# Patient Record
Sex: FEMALE | Race: WHITE | Employment: FULL TIME | URBAN - METROPOLITAN AREA
[De-identification: names, ages, dates, MRNs, and addresses within clinical notes are randomized per-mention and may not be internally consistent; named-entity substitution may affect disease eponyms.]

---

## 2017-02-27 ENCOUNTER — OFFICE VISIT (OUTPATIENT)
Dept: FAMILY MEDICINE | Facility: CLINIC | Age: 42
End: 2017-02-27
Payer: COMMERCIAL

## 2017-02-27 VITALS
TEMPERATURE: 97.6 F | HEART RATE: 82 BPM | DIASTOLIC BLOOD PRESSURE: 86 MMHG | HEIGHT: 66 IN | SYSTOLIC BLOOD PRESSURE: 120 MMHG | WEIGHT: 184 LBS | BODY MASS INDEX: 29.57 KG/M2

## 2017-02-27 DIAGNOSIS — B37.9 CANDIDA INFECTION: Primary | ICD-10-CM

## 2017-02-27 DIAGNOSIS — L30.9 VULVAR DERMATITIS: ICD-10-CM

## 2017-02-27 PROCEDURE — 99214 OFFICE O/P EST MOD 30 MIN: CPT | Performed by: FAMILY MEDICINE

## 2017-02-27 RX ORDER — FLUCONAZOLE 150 MG/1
150 TABLET ORAL
Qty: 6 TABLET | Refills: 1 | Status: SHIPPED | OUTPATIENT
Start: 2017-02-27 | End: 2018-02-06

## 2017-02-27 NOTE — PROGRESS NOTES
SUBJECTIVE:                                                    Kathi Rehman is a 41 year old female who presents to clinic today for the following health issues:    Has been on going for 5 years, seen [facility] and other providers.   Vaginal irritation. Recently bad chapped lips.   Told Eczema, has tried many    Pins and needles, burning, discomfort. Increased at night.   Patty Maza CMA    Problem list and histories reviewed & adjusted, as indicated.  Additional history: has burning pins and needles in the groin and it is annoying she has taking ice packs steroid creams, lotions for eczema and   Saw a derm who thought it was eczema   She has also been seeing an [facility] doctor and she thought it might be a uterine infection   She does have bumps in the area and primarily in the groin /labial area had been very intermittent but now it is more persistent in the last 3-4 months\  She would like to know what it is and how to treat ti. She hasnever taken antihistamines  She did do the best when she took oral antibitoics.       Patient Active Problem List   Diagnosis     Oligomenorrhea     PCOS (polycystic ovarian syndrome)     CARDIOVASCULAR SCREENING; LDL GOAL LESS THAN 160     Abscess     Past Surgical History   Procedure Laterality Date     Excise exostosis toe(s) Right 1/13/2015     Procedure: EXCISE EXOSTOSIS TOE(S);  Surgeon: Tacho Christensen DPM;  Location: [place] OR       Social History   Substance Use Topics     Smoking status: Former Smoker     Smokeless tobacco: Never Used     Alcohol use Yes      Comment: 1 drinks a week or less     Family History   Problem Relation Age of Onset     DIABETES Maternal Grandfather      Cancer - colorectal Maternal Grandfather      Prostate Cancer Paternal Grandmother      Prostate Cancer Paternal Grandfather            ROS:  Constitutional, HEENT, cardiovascular, pulmonary, gi and gu systems are negative, except as otherwise noted.    OBJECTIVE:                   "                                  BP (!) 141/98 (BP Location: Right arm, Cuff Size: Adult Regular)  Pulse 82  Temp 97.6  F (36.4  C) (Tympanic)  Ht 5' 6\" (1.676 m)  Wt 184 lb (83.5 kg)  BMI 29.7 kg/m2 Body mass index is 29.7 kg/(m^2).   GENERAL APPEARANCE: healthy, alert and no distress  ABDOMEN: soft, nontender, without hepatosplenomegaly or masses and bowel sounds normal   (female): normal cervix, adnexae, and uterus without masses or discharge  SKIN: excoriation - bialteral groin excoriations with red base  and lichenification no vesicles no lesions on vulva       ASSESSMENT/PLAN:                                                    1. Candida infection    - fluconazole (DIFLUCAN) 150 MG tablet; Take 1 tablet (150 mg) by mouth every 3 days  Dispense: 6 tablet; Refill: 1    2. Vulvar dermatitis  Patient Instructions   Take the diflucan 1 every 3 days  You can use the topical steroid also if the itching is very bad for the next 1-2 weeks  If this is working you can do this every now and then            reports that she has quit smoking. She has never used smokeless tobacco.          Berta Lake M.D.  [facility]    "

## 2017-02-27 NOTE — MR AVS SNAPSHOT
After Visit Summary   2/27/2017    Kathi Rehman    MRN: [id number]           Patient Information     Date Of Birth          1975        Visit Information        Provider Department      2/27/2017 3:00 PM Berta Lake MD [facility]        Today's Diagnoses     Candida infection    -  1      Care Instructions    Take the diflucan 1 every 3 days  You can use the topical steroid also if the itching is very bad for the next 1-2 weeks  If this is working you can do this every now and then         Follow-ups after your visit        Your next 10 appointments already scheduled     Mar 21, 2017 10:15 AM CDT   [facility] with Angie Morales MD   [facility]    [address] [address] [phone]           Bring a current list of meds and any records pertaining to this visit.  For Physicals, please bring immunization records and any forms needing to be filled out.  Please arrive 10 minutes early to complete paperwork.              Who to contact     Normal or non-critical lab and imaging results will be communicated to you by "[employer]hart, letter or phone within 4 business days after the clinic has received the results. If you do not hear from us within 7 days, please contact the clinic through [employer] or phone. If you have a critical or abnormal lab result, we will notify you by phone as soon as possible.  Submit refill requests through [email or web address] or call your pharmacy and they will forward the refill request to us. Please allow 3 business days for your refill to be completed.          If you need to speak with a  for additional information , please call: [phone]             Additional Information About Your Visit        [email or web address] Information     [email or web address] lets you send messages to your doctor, view your test results, renew your prescriptions, schedule appointments and more. To sign up, go to  [email or web address] . Click on \"Log in\" on the left side of the screen, which will take you to the Welcome page. Then click on \"Sign up Now\" on the right side of the page.     You will be asked to enter the access code listed below, as well as some personal information. Please follow the directions to create your username and password.     Your access code is: [email or web address]  Expires: 3/1/2017  9:24 AM     Your access code will  in 90 days. If you need help or a new code, please call your [place] clinic or [phone].        Care EveryWhere ID     This is your Care EveryWhere ID. This could be used by other organizations to access your [place] medical records  [id number]        Your Vitals Were     Pulse Temperature Height BMI (Body Mass Index)          82 97.6  F (36.4  C) (Tympanic) 5' 6\" (1.676 m) 29.7 kg/m2         Blood Pressure from Last 3 Encounters:   17 (!) 141/98   16 118/83   16 96/76    Weight from Last 3 Encounters:   17 184 lb (83.5 kg)   16 178 lb 3.2 oz (80.8 kg)   16 174 lb 6.4 oz (79.1 kg)              Today, you had the following     No orders found for display         Today's Medication Changes          These changes are accurate as of: 17  3:57 PM.  If you have any questions, ask your nurse or doctor.               Start taking these medicines.        Dose/Directions    fluconazole 150 MG tablet   Commonly known as:  DIFLUCAN   Used for:  Candida infection   Started by:  Berta Lake MD        Dose:  150 mg   Take 1 tablet (150 mg) by mouth every 3 days   Quantity:  6 tablet   Refills:  1            Where to get your medicines      These medications were sent to [facility] - [address]  [address] [address]     Phone:  [phone]     fluconazole 150 MG tablet                Primary Care Provider Office Phone # Fax #    Kathy Venegas [phone]       [facility] [place] [facility] [address] [place] [address]        Thank you!     Thank you for choosing [facility]  for your care. Our goal is always to provide you with excellent care. Hearing back from our patients is one way we can continue to improve our services. Please take a few minutes to complete the written survey that you may receive in the mail after your visit with us. Thank you!             Your Updated Medication List - Protect others around you: Learn how to safely use, store and throw away your medicines at www.disposemymeds.org.          This list is accurate as of: 2/27/17  3:57 PM.  Always use your most recent med list.                   Brand Name Dispense Instructions for use    drospirenone-ethinyl estradiol 3-0.03 MG per tablet    YVON    90 tablet    Take 1 tablet by mouth daily       fluconazole 150 MG tablet    DIFLUCAN    6 tablet    Take 1 tablet (150 mg) by mouth every 3 days

## 2017-02-27 NOTE — PATIENT INSTRUCTIONS
Take the diflucan 1 every 3 days  You can use the topical steroid also if the itching is very bad for the next 1-2 weeks  If this is working you can do this every now and then

## 2017-02-27 NOTE — NURSING NOTE
"Chief Complaint   Patient presents with     Derm Problem       Initial BP (!) 141/98 (BP Location: Right arm, Cuff Size: Adult Regular)  Pulse 82  Temp 97.6  F (36.4  C) (Tympanic)  Ht 5' 6\" (1.676 m)  Wt 184 lb (83.5 kg)  BMI 29.7 kg/m2 Estimated body mass index is 29.7 kg/(m^2) as calculated from the following:    Height as of this encounter: 5' 6\" (1.676 m).    Weight as of this encounter: 184 lb (83.5 kg).  Medication Reconciliation: complete     Patty aMza CMA    "

## 2017-03-21 ENCOUNTER — OFFICE VISIT (OUTPATIENT)
Dept: OBGYN | Facility: CLINIC | Age: 42
End: 2017-03-21
Payer: COMMERCIAL

## 2017-03-21 VITALS
DIASTOLIC BLOOD PRESSURE: 93 MMHG | WEIGHT: 184.8 LBS | BODY MASS INDEX: 29.7 KG/M2 | HEIGHT: 66 IN | HEART RATE: 99 BPM | TEMPERATURE: 98.1 F | SYSTOLIC BLOOD PRESSURE: 126 MMHG

## 2017-03-21 DIAGNOSIS — N89.8 VAGINAL ITCHING: Primary | ICD-10-CM

## 2017-03-21 LAB
MICRO REPORT STATUS: NORMAL
SPECIMEN SOURCE: NORMAL
WET PREP SPEC: NORMAL

## 2017-03-21 PROCEDURE — 87210 SMEAR WET MOUNT SALINE/INK: CPT | Performed by: OBSTETRICS & GYNECOLOGY

## 2017-03-21 PROCEDURE — 88312 SPECIAL STAINS GROUP 1: CPT | Performed by: OBSTETRICS & GYNECOLOGY

## 2017-03-21 PROCEDURE — 56605 BIOPSY OF VULVA/PERINEUM: CPT | Performed by: OBSTETRICS & GYNECOLOGY

## 2017-03-21 PROCEDURE — 88305 TISSUE EXAM BY PATHOLOGIST: CPT | Performed by: OBSTETRICS & GYNECOLOGY

## 2017-03-21 PROCEDURE — 99204 OFFICE O/P NEW MOD 45 MIN: CPT | Mod: 25 | Performed by: OBSTETRICS & GYNECOLOGY

## 2017-03-21 RX ORDER — NYSTATIN AND TRIAMCINOLONE ACETONIDE 100000; 1 [USP'U]/G; MG/G
CREAM TOPICAL 2 TIMES DAILY
Qty: 15 G | Refills: 1 | Status: SHIPPED | OUTPATIENT
Start: 2017-03-21 | End: 2018-02-06

## 2017-03-21 RX ORDER — LIDOCAINE HYDROCHLORIDE AND EPINEPHRINE 10; 10 MG/ML; UG/ML
3 INJECTION, SOLUTION INFILTRATION; PERINEURAL ONCE
Qty: 3 ML | Refills: 0 | OUTPATIENT
Start: 2017-03-21 | End: 2017-03-21

## 2017-03-21 RX ORDER — DIPHENHYDRAMINE HYDROCHLORIDE, ZINC ACETATE 2; .1 G/100G; G/100G
CREAM TOPICAL 3 TIMES DAILY PRN
Qty: 1 TUBE | Refills: 3 | Status: SHIPPED | OUTPATIENT
Start: 2017-03-21 | End: 2018-02-06

## 2017-03-21 NOTE — PROGRESS NOTES
Please call with results and let her know they are normal.      No yeast visible on the surface of the skin.     Angie Morlaes MD, MPH

## 2017-03-21 NOTE — PROGRESS NOTES
"  SUBJECTIVE:                                                   CC:  Patient presents with:  Consult: vaginal itching and burning      HPI:  Kathi Rehman is a 41 year old  who presents to discuss groin itching.  She has noted a burning and itching feeling in her bilateral groin area for the last 5 years.  It flared in November and she was seen by a dermatologist who told her it was eczema.  She was given antibiotics and a hydrating skin cream which did improve the rash for awhile.  In December it flared again, in  once more, and in February when it flared she also experienced lip swelling.  She tries hard not to scratch it, but does rub it for palliation.  She has tried benadryl.  She has tried hydrocortisone cream.  She has never been tested for yeast and it has never been biopsied.  She does not shave the area.  She scratches it overnight her  has told her.    ROS: 10 point ROS negative other than as listed above in HPI.    Gyn History:  Patient's last menstrual period was 2017.     Patient is not sexually active.  Using oral contraceptives for contraception.   Recent pap smears:    Lab Results   Component Value Date    PAP NIL 2016       PMH, PSH, Soc Hx, Meds, and allergies reviewed in Epic.    OBJECTIVE:     BP (!) 126/93 (BP Location: Right arm, Patient Position: Chair, Cuff Size: Adult Regular)  Pulse 99  Temp 98.1  F (36.7  C) (Oral)  Ht 5' 6\" (1.676 m)  Wt 184 lb 12.8 oz (83.8 kg)  LMP 2017  Breastfeeding? No  BMI 29.83 kg/m2  Body mass index is 29.83 kg/(m^2).    Gen: Healthy appearing female, no acute distress, comfortable, very dry chapped lips  HENT: No scleral injection or icterus  CV: Regular rate  Resp: Normal work of breathing, no cough  GI: Abdomen soft, non-tender. No masses, organomegaly  Skin: No suspicious lesions or rashes on other areas of skin  Psychiatric: mentation appears normal and affect bright    :  Normal hair distribution.  Wet prep " obtained from the groin area. There are multiple small erythematous papules on distribution of the follicles, very small ulcerations, not overly tender, and they are spread over the mons and bilaterally into the groin.  SSE: Speculum exam reveals vaginal epithelium well rugated with normal physiologic discharge. Cervix appears smooth, pink, with no visible lesions.   Bimanual exam reveals normal size uterus, non-tender, and mobile. No adnexal masses or tenderness. No cervical motion tenderness.     INDICATIONS:                                                    Kathi Rehman is a 41 year old female that was found to have a bilateral vulvar lesion.    Is a pregnancy test required: No.  Was a consent obtained?  Yes    Today's PHQ-2 Score:   PHQ-2 ( 1999 Pf[employer]) 3/21/2017   Q1: Little interest or pleasure in doing things 0   Q2: Feeling down, depressed or hopeless 0   PHQ-2 Score 0       PROCEDURE:                                                      The area on the left groin was prepped with Betadine. the area was injected with 3 cc's of 1%  Lidocaine without epinephrine.  After adequate anesthesia was reached, the skin was flattened with one hand and a sample of the abnormal area was made with a punch biopsy instrument.  The skin disc was elevated and scissors used to cut the underlying tissue.  The specimen was placed in formalin and sent to pathology for evaluation.  Pressure was applied to the biopsy site to control bleeding. Silver nitrate was applied.  Hemostasis was adequate.     POST PROCEDURE:                                                      She was observed.  She tolerated the procedure well. There were no complications. Patient was discharged in stable condition.    Hot Sitz bath BID, no douching, tampons or intercourse.  Call if bleeding, swelling, pain, redness or fever occur.  Patient will be called with pathology results.    Angie Morales MD      ASSESSMENT/PLAN:                                                       1. Vaginal itching  Discussed the following creams to use to decrease inflammation and irritation.  Will proceed as appropriate based upon findings of wet prep and biopsy specimen.  Consider referral to dermatology if nothing turns up.  - Wet prep  - Surgical pathology exam  - diphenhydrAMINE-zinc acetate (BENADRYL EXTRA STRENGTH) cream; Apply topically 3 times daily as needed for itching  Dispense: 1 Tube; Refill: 3  - nystatin-triamcinolone (MYCOLOG II) cream; Apply topically 2 times daily  Dispense: 15 g; Refill: 1       Angie Morales MD, MPH  Obstetrics and Gynecology

## 2017-03-21 NOTE — LETTER
[facility]  [address] [address]  Phone: [phone]    March 27, 2017    Kathi Ori  [address]  [place] [address]          Dear Ms. Rehman,    The results of your recent Vulva biopsy results were within normal limits. It looks like the skin is scratched (excoriated) and inflamed and the hair follicle is inflamed and possibly ingrown, but there is no problem with the skin itself (no infection or other pathological process).  You can try a warm compress to the area to keep from getting ingrown hairs, and good vulvar hygiene:     Tips for taking care of the vagina inside and outside:     - Wear 100% cotton underwear which breathes better.  You can wear no underwear at night.   - Avoid tight fitting undergarments and pantyhose   - When you have increased moisture to the area, like after working out or swimming, change clothes right away   - You should not clean inside the vagina (this is called douching).  If you want to clean the outside, use water or mild soaps only.   - Do not use vaginal wipes, vaginal deodorants, or bubble baths.  Do not use pads or tampons with deodorants.  Avoid clothing detergents with added scents or dyes.   - Use water-based or silicone-based lubrication for intercourse.  Alcohol-based gels can dry out the vagina and make it more painful.   - The pH (acid-base balance) of the vagina is normally acidic.  If you take antibiotics, this can make it less acidic and can lead to overgrowth of certain types of bacteria.  Eating yogurt or taking probiotics during antibiotic treatment can help with this.     Enclosed is a copy of these results.  If you have any further questions or problems, please contact our office.    Sincerely,      Annie Morales MD/ arg

## 2017-03-21 NOTE — MR AVS SNAPSHOT
"              After Visit Summary   3/21/2017    Kathi Rehman    MRN: [id number]           Patient Information     Date Of Birth          1975        Visit Information        Provider Department      3/21/2017 10:15 AM Angie Morales MD [facility]        Today's Diagnoses     Vaginal itching    -  1       Follow-ups after your visit        Who to contact     If you have questions or need follow up information about today's clinic visit or your schedule please contact [facility] directly at [phone].  Normal or non-critical lab and imaging results will be communicated to you by [employer], letter or phone within 4 business days after the clinic has received the results. If you do not hear from us within 7 days, please contact the clinic through [employer] or phone. If you have a critical or abnormal lab result, we will notify you by phone as soon as possible.  Submit refill requests through [employer] or call your pharmacy and they will forward the refill request to us. Please allow 3 business days for your refill to be completed.          Additional Information About Your Visit        MyChart Information     [employer] lets you send messages to your doctor, view your test results, renew your prescriptions, schedule appointments and more. To sign up, go to [email or web address] . Click on \"Log in\" on the left side of the screen, which will take you to the Welcome page. Then click on \"Sign up Now\" on the right side of the page.     You will be asked to enter the access code listed below, as well as some personal information. Please follow the directions to create your username and password.     Your access code is: [email or web address]  Expires: 2017 12:25 PM     Your access code will  in 90 days. If you need help or a new code, please call your [facility] or [phone].        Care EveryWhere ID     This is your Care EveryWhere ID. This could be used by other " "organizations to access your [place] medical records  [id number]        Your Vitals Were     Pulse Temperature Height Last Period Breastfeeding? BMI (Body Mass Index)    99 98.1  F (36.7  C) (Oral) 5' 6\" (1.676 m) 02/28/2017 No 29.83 kg/m2       Blood Pressure from Last 3 Encounters:   03/21/17 (!) 126/93   02/27/17 120/86   12/01/16 118/83    Weight from Last 3 Encounters:   03/21/17 184 lb 12.8 oz (83.8 kg)   02/27/17 184 lb (83.5 kg)   12/01/16 178 lb 3.2 oz (80.8 kg)              We Performed the Following     BIOPSY VULVA/PERINEUM, ONE LESION     Surgical pathology exam     Wet prep          Today's Medication Changes          These changes are accurate as of: 3/21/17  3:18 PM.  If you have any questions, ask your nurse or doctor.               Start taking these medicines.        Dose/Directions    diphenhydrAMINE-zinc acetate cream   Commonly known as:  BENADRYL EXTRA STRENGTH   Used for:  Vaginal itching   Started by:  Angie Morales MD        Apply topically 3 times daily as needed for itching   Quantity:  1 Tube   Refills:  3       lidocaine 1% with EPINEPHrine 1:100,000 1 %-1:897305 injection   Used for:  Vaginal itching   Started by:  Angie Morales MD        Dose:  3 mL   Inject 3 mLs into the skin once for 1 dose   Quantity:  3 mL   Refills:  0       nystatin-triamcinolone cream   Commonly known as:  MYCOLOG II   Used for:  Vaginal itching   Started by:  Angie Morales MD        Apply topically 2 times daily   Quantity:  15 g   Refills:  1            Where to get your medicines      These medications were sent to [facility] - [address]  [address] [address]     Phone:  [phone]     diphenhydrAMINE-zinc acetate cream    nystatin-triamcinolone cream         Some of these will need a paper prescription and others can be bought over the counter.  Ask your nurse if you have questions.     You don't need a prescription for " these medications     lidocaine 1% with EPINEPHrine 1:100,000 1 %-1:088729 injection                Primary Care Provider Office Phone # Fax #    Kathy Venegas [phone]       [facility] [place] [facility] [address] [place] [address]        Thank you!     Thank you for choosing [facility]  for your care. Our goal is always to provide you with excellent care. Hearing back from our patients is one way we can continue to improve our services. Please take a few minutes to complete the written survey that you may receive in the mail after your visit with us. Thank you!             Your Updated Medication List - Protect others around you: Learn how to safely use, store and throw away your medicines at www.disposemymeds.org.          This list is accurate as of: 3/21/17  3:18 PM.  Always use your most recent med list.                   Brand Name Dispense Instructions for use    diphenhydrAMINE-zinc acetate cream    BENADRYL EXTRA STRENGTH    1 Tube    Apply topically 3 times daily as needed for itching       drospirenone-ethinyl estradiol 3-0.03 MG per tablet    YVON    90 tablet    Take 1 tablet by mouth daily       fluconazole 150 MG tablet    DIFLUCAN    6 tablet    Take 1 tablet (150 mg) by mouth every 3 days       lidocaine 1% with EPINEPHrine 1:100,000 1 %-1:875358 injection     3 mL    Inject 3 mLs into the skin once for 1 dose       nystatin-triamcinolone cream    MYCOLOG II    15 g    Apply topically 2 times daily

## 2017-03-21 NOTE — NURSING NOTE
"Chief Complaint   Patient presents with     Consult     vaginal itching and burning       Initial BP (!) 126/93 (BP Location: Right arm, Patient Position: Chair, Cuff Size: Adult Regular)  Pulse 99  Temp 98.1  F (36.7  C) (Oral)  Ht 5' 6\" (1.676 m)  Wt 184 lb 12.8 oz (83.8 kg)  LMP 02/28/2017  Breastfeeding? No  BMI 29.83 kg/m2 Estimated body mass index is 29.83 kg/(m^2) as calculated from the following:    Height as of this encounter: 5' 6\" (1.676 m).    Weight as of this encounter: 184 lb 12.8 oz (83.8 kg).  Medication Reconciliation: complete   Amanda Rebollar CMA      "

## 2017-03-24 LAB — COPATH REPORT: NORMAL

## 2017-03-26 NOTE — PROGRESS NOTES
Please call with results and let her know they are normal.  Please also send a letter with the pathology result.    Kathi - it looks like the skin is scratched (excoriated) and inflamed and the hair follicle is inflamed and possibly ingrown, but there is no problem with the skin itself (no infection or other pathological process).  You can try a warm compress to the area to keep from getting ingrown hairs, and good vulvar hygiene:  Tips for taking care of the vagina inside and outside:    - Wear 100% cotton underwear which breathes better.  You can wear no underwear at night.  - Avoid tight fitting undergarments and pantyhose  - When you have increased moisture to the area, like after working out or swimming, change clothes right away  - You should not clean inside the vagina (this is called douching).  If you want to clean the outside, use water or mild soaps only.  - Do not use vaginal wipes, vaginal deodorants, or bubble baths.  Do not use pads or tampons with deodorants.  Avoid clothing detergents with added scents or dyes.  - Use water-based or silicone-based lubrication for intercourse.  Alcohol-based gels can dry out the vagina and make it more painful.  - The pH (acid-base balance) of the vagina is normally acidic.  If you take antibiotics, this can make it less acidic and can lead to overgrowth of certain types of bacteria.  Eating yogurt or taking probiotics during antibiotic treatment can help with this.      Angie Morales MD, MPH

## 2018-02-06 ENCOUNTER — OFFICE VISIT (OUTPATIENT)
Dept: DERMATOLOGY | Facility: CLINIC | Age: 43
End: 2018-02-06
Payer: COMMERCIAL

## 2018-02-06 VITALS — TEMPERATURE: 97.5 F | DIASTOLIC BLOOD PRESSURE: 90 MMHG | HEART RATE: 74 BPM | SYSTOLIC BLOOD PRESSURE: 144 MMHG

## 2018-02-06 DIAGNOSIS — L30.9 DERMATITIS: Primary | ICD-10-CM

## 2018-02-06 PROCEDURE — 99203 OFFICE O/P NEW LOW 30 MIN: CPT | Performed by: PHYSICIAN ASSISTANT

## 2018-02-06 RX ORDER — BETAMETHASONE DIPROPIONATE 0.5 MG/G
CREAM TOPICAL
Qty: 45 G | Refills: 2 | Status: SHIPPED | OUTPATIENT
Start: 2018-02-06

## 2018-02-06 NOTE — LETTER
2/6/2018         RE: Kathi Rehman  [address]  [facility] [address]        Dear Colleague,    Thank you for referring your patient, Kathi Rehman, to the [facility]. Please see a copy of my visit note below.    Kathi Rehman is a 42 year old year old female patient here today for rash on chest.  Patient states this has been present for 3 months.  Patient report that it has been intermittent. She notes that today it is gone. She notes itching at night. She uses herbal essence shower gel and kiss my face moisturizer. She has tried benadryl. She denies any other symptoms. No on else has similar rash. No new medications or products. Patient has no other skin complaints today.  Remainder of the HPI, Meds, PMH, Allergies, FH, and SH was reviewed in chart.   Past Medical History:   Diagnosis Date     Oligomenorrhea        Past Surgical History:   Procedure Laterality Date     EXCISE EXOSTOSIS TOE(S) Right 1/13/2015    Procedure: EXCISE EXOSTOSIS TOE(S);  Surgeon: Tacho Christensen DPM;  Location: [facility] OR        Family History   Problem Relation Age of Onset     DIABETES Maternal Grandfather      Cancer - colorectal Maternal Grandfather      Prostate Cancer Paternal Grandmother      Prostate Cancer Paternal Grandfather        Social History     Social History     Marital status:      Spouse name: N/A     Number of children: N/A     Years of education: N/A     Occupational History     Not on file.     Social History Main Topics     Smoking status: Former Smoker     Smokeless tobacco: Never Used     Alcohol use Yes      Comment: 1 drinks a week or less     Drug use: No     Sexual activity: Yes     Partners: Male     Birth control/ protection: Pill      Comment: ocp     Other Topics Concern     Parent/Sibling W/ Cabg, Mi Or Angioplasty Before 65f 55m? No     Social History Narrative       Outpatient Encounter Prescriptions as of 2/6/2018   Medication Sig Dispense Refill      betamethasone dipropionate (DIPROSONE) 0.05 % cream Apply sparingly to affected area on chest and abdomen twice daily as needed.  Do not apply to face. 45 g 2     [DISCONTINUED] diphenhydrAMINE-zinc acetate (BENADRYL EXTRA STRENGTH) cream Apply topically 3 times daily as needed for itching 1 Tube 3     [DISCONTINUED] nystatin-triamcinolone (MYCOLOG II) cream Apply topically 2 times daily 15 g 1     [DISCONTINUED] fluconazole (DIFLUCAN) 150 MG tablet Take 1 tablet (150 mg) by mouth every 3 days (Patient not taking: Reported on 3/21/2017) 6 tablet 1     [DISCONTINUED] drospirenone-ethinyl estradiol (YVON) 3-0.03 MG per tablet Take 1 tablet by mouth daily (Patient not taking: Reported on 3/21/2017) 90 tablet 1     No facility-administered encounter medications on file as of 2/6/2018.              Review Of Systems  Skin: As above  Eyes: negative  Ears/Nose/Throat: negative  Respiratory: No shortness of breath, dyspnea on exertion, cough, or hemoptysis  Cardiovascular: negative  Gastrointestinal: negative  Genitourinary: negative  Musculoskeletal: negative  Neurologic: negative  Psychiatric: negative  Hematologic/Lymphatic/Immunologic: negative  Endocrine: negative      O:   NAD, WDWN, Alert & Oriented, Mood & Affect wnl, Vitals stable   Here today alone   /90 (BP Location: Right arm, Patient Position: Chair, Cuff Size: Adult Large)  Pulse 74  Temp 97.5  F (36.4  C) (Tympanic)  Breastfeeding? No   General appearance normal   Vitals stable   Alert, oriented and in no acute distress      No visible rash seen today, rare PIH macule on chest       Eyes: Conjunctivae/lids:Normal     ENT: Lips: normal    MSK:Normal    Cardiovascular: peripheral edema none    Pulm: Breathing Normal    Neuro/Psych: Orientation:Normal; Mood/Affect:Normal  A/P:  1. Dermatitis  Apply betamethasone cream twice daily to rash.     Moisturize daily with cetaphil or vanicream.    Wash with dove sensitive skin wash.     Avoid hot water.      Recheck if flaring, we will work patient in.     Again, thank you for allowing me to participate in the care of your patient.        Sincerely,        Amanda Henning PA-C

## 2018-02-06 NOTE — MR AVS SNAPSHOT
"              After Visit Summary   2/6/2018    Kathi Rehman    MRN: [id number]           Patient Information     Date Of Birth          1975        Visit Information        Provider Department      2/6/2018 1:40 PM Amanda Kay PA-C [facility]        Today's Diagnoses     Dermatitis    -  1      Care Instructions    Apply betamethasone cream twice daily to rash.     Moisturize daily with cetaphil or vanicream.    Wash with dove sensitive skin wash.     Avoid hot water.     Recheck if flaring, please call nurse so we can squeeze in to see rash when it is flared.           Follow-ups after your visit        Who to contact     If you have questions or need follow up information about today's clinic visit or your schedule please contact [facility] directly at [phone].  Normal or non-critical lab and imaging results will be communicated to you by Xceivehart, letter or phone within 4 business days after the clinic has received the results. If you do not hear from us within 7 days, please contact the clinic through Xceivehart or phone. If you have a critical or abnormal lab result, we will notify you by phone as soon as possible.  Submit refill requests through DwellGreen or call your pharmacy and they will forward the refill request to us. Please allow 3 business days for your refill to be completed.          Additional Information About Your Visit        MyChart Information     DwellGreen lets you send messages to your doctor, view your test results, renew your prescriptions, schedule appointments and more. To sign up, go to [email or web address] . Click on \"Log in\" on the left side of the screen, which will take you to the Welcome page. Then click on \"Sign up Now\" on the right side of the page.     You will be asked to enter the access code listed below, as well as some personal information. Please follow the directions to create your username and password.     Your access code " is: [email or web address]  Expires: 2018  2:13 PM     Your access code will  in 90 days. If you need help or a new code, please call your [facility] clinic or [phone].        Care EveryWhere ID     This is your Care EveryWhere ID. This could be used by other organizations to access your [facility] medical records  [id number]        Your Vitals Were     Pulse Temperature Breastfeeding?             74 97.5  F (36.4  C) (Tympanic) No          Blood Pressure from Last 3 Encounters:   18 144/90   17 (!) 126/93   17 120/86    Weight from Last 3 Encounters:   17 184 lb 12.8 oz (83.8 kg)   17 184 lb (83.5 kg)   16 178 lb 3.2 oz (80.8 kg)              Today, you had the following     No orders found for display         Today's Medication Changes          These changes are accurate as of 18  2:13 PM.  If you have any questions, ask your nurse or doctor.               Start taking these medicines.        Dose/Directions    betamethasone dipropionate 0.05 % cream   Commonly known as:  DIPROSONE   Used for:  Dermatitis        Apply sparingly to affected area on chest and abdomen twice daily as needed.  Do not apply to face.   Quantity:  45 g   Refills:  2            Where to get your medicines      These medications were sent to [facility] [address]  [address] [address]     Phone:  [phone]     betamethasone dipropionate 0.05 % cream                Primary Care Provider Office Phone # Fax #    Kathy Venegas [phone] [address] [place] [address]        Equal Access to Services     KIRTI VELASQUEZ AH: Hunter Aguiar, wajoshua horowitz, ida kaalmada mare, jemima ordoñez. So [facility] [phone].    ATENCIÓN: Si habla español, tiene a higuera disposición servicios gratuitos de asistencia lingüística. Llame al [phone].    We comply with applicable federal  civil rights laws and [place] laws. We do not discriminate on the basis of race, color, national origin, age, disability, sex, sexual orientation, or gender identity.            Thank you!     Thank you for choosing [facility]  for your care. Our goal is always to provide you with excellent care. Hearing back from our patients is one way we can continue to improve our services. Please take a few minutes to complete the written survey that you may receive in the mail after your visit with us. Thank you!             Your Updated Medication List - Protect others around you: Learn how to safely use, store and throw away your medicines at www.disposemymeds.org.          This list is accurate as of 2/6/18  2:13 PM.  Always use your most recent med list.                   Brand Name Dispense Instructions for use Diagnosis    betamethasone dipropionate 0.05 % cream    DIPROSONE    45 g    Apply sparingly to affected area on chest and abdomen twice daily as needed.  Do not apply to face.    Dermatitis

## 2018-02-06 NOTE — PATIENT INSTRUCTIONS
Apply betamethasone cream twice daily to rash.     Moisturize daily with cetaphil or vanicream.    Wash with dove sensitive skin wash.     Avoid hot water.     Recheck if flaring, please call nurse so we can squeeze in to see rash when it is flared.

## 2018-02-06 NOTE — NURSING NOTE
"Initial /90 (BP Location: Right arm, Patient Position: Chair, Cuff Size: Adult Large)  Pulse 74  Temp 97.5  F (36.4  C) (Tympanic)  Breastfeeding? No Estimated body mass index is 29.83 kg/(m^2) as calculated from the following:    Height as of 3/21/17: 5' 6\" (1.676 m).    Weight as of 3/21/17: 184 lb 12.8 oz (83.8 kg). .    Nhung Alex    "

## 2018-02-09 NOTE — PROGRESS NOTES
Kathi Rehman is a 42 year old year old female patient here today for rash on chest.  Patient states this has been present for 3 months.  Patient report that it has been intermittent. She notes that today it is gone. She notes itching at night. She uses herbal essence shower gel and kiss my face moisturizer. She has tried benadryl. She denies any other symptoms. No on else has similar rash. No new medications or products. Patient has no other skin complaints today.  Remainder of the HPI, Meds, PMH, Allergies, FH, and SH was reviewed in chart.   Past Medical History:   Diagnosis Date     Oligomenorrhea        Past Surgical History:   Procedure Laterality Date     EXCISE EXOSTOSIS TOE(S) Right 1/13/2015    Procedure: EXCISE EXOSTOSIS TOE(S);  Surgeon: Tacho Christensen DPM;  Location: [facility] OR        Family History   Problem Relation Age of Onset     DIABETES Maternal Grandfather      Cancer - colorectal Maternal Grandfather      Prostate Cancer Paternal Grandmother      Prostate Cancer Paternal Grandfather        Social History     Social History     Marital status:      Spouse name: N/A     Number of children: N/A     Years of education: N/A     Occupational History     Not on file.     Social History Main Topics     Smoking status: Former Smoker     Smokeless tobacco: Never Used     Alcohol use Yes      Comment: 1 drinks a week or less     Drug use: No     Sexual activity: Yes     Partners: Male     Birth control/ protection: Pill      Comment: ocp     Other Topics Concern     Parent/Sibling W/ Cabg, Mi Or Angioplasty Before 65f 55m? No     Social History Narrative       Outpatient Encounter Prescriptions as of 2/6/2018   Medication Sig Dispense Refill     betamethasone dipropionate (DIPROSONE) 0.05 % cream Apply sparingly to affected area on chest and abdomen twice daily as needed.  Do not apply to face. 45 g 2     [DISCONTINUED] diphenhydrAMINE-zinc acetate (BENADRYL EXTRA STRENGTH) cream Apply topically  3 times daily as needed for itching 1 Tube 3     [DISCONTINUED] nystatin-triamcinolone (MYCOLOG II) cream Apply topically 2 times daily 15 g 1     [DISCONTINUED] fluconazole (DIFLUCAN) 150 MG tablet Take 1 tablet (150 mg) by mouth every 3 days (Patient not taking: Reported on 3/21/2017) 6 tablet 1     [DISCONTINUED] drospirenone-ethinyl estradiol (YVON) 3-0.03 MG per tablet Take 1 tablet by mouth daily (Patient not taking: Reported on 3/21/2017) 90 tablet 1     No facility-administered encounter medications on file as of 2/6/2018.              Review Of Systems  Skin: As above  Eyes: negative  Ears/Nose/Throat: negative  Respiratory: No shortness of breath, dyspnea on exertion, cough, or hemoptysis  Cardiovascular: negative  Gastrointestinal: negative  Genitourinary: negative  Musculoskeletal: negative  Neurologic: negative  Psychiatric: negative  Hematologic/Lymphatic/Immunologic: negative  Endocrine: negative      O:   NAD, WDWN, Alert & Oriented, Mood & Affect wnl, Vitals stable   Here today alone   /90 (BP Location: Right arm, Patient Position: Chair, Cuff Size: Adult Large)  Pulse 74  Temp 97.5  F (36.4  C) (Tympanic)  Breastfeeding? No   General appearance normal   Vitals stable   Alert, oriented and in no acute distress      No visible rash seen today, rare PIH macule on chest       Eyes: Conjunctivae/lids:Normal     ENT: Lips: normal    MSK:Normal    Cardiovascular: peripheral edema none    Pulm: Breathing Normal    Neuro/Psych: Orientation:Normal; Mood/Affect:Normal  A/P:  1. Dermatitis  Apply betamethasone cream twice daily to rash.     Moisturize daily with cetaphil or vanicream.    Wash with dove sensitive skin wash.     Avoid hot water.     Recheck if flaring, we will work patient in.

## 2020-01-01 ENCOUNTER — PREP FOR PROCEDURE (OUTPATIENT)
Dept: CARDIOLOGY | Facility: CLINIC | Age: 45
End: 2020-01-01

## 2020-01-01 ENCOUNTER — APPOINTMENT (OUTPATIENT)
Dept: GENERAL RADIOLOGY | Facility: CLINIC | Age: 45
DRG: 003 | End: 2020-01-01
Attending: STUDENT IN AN ORGANIZED HEALTH CARE EDUCATION/TRAINING PROGRAM
Payer: COMMERCIAL

## 2020-01-01 ENCOUNTER — APPOINTMENT (OUTPATIENT)
Dept: NUCLEAR MEDICINE | Facility: CLINIC | Age: 45
DRG: 003 | End: 2020-01-01
Attending: NURSE PRACTITIONER
Payer: COMMERCIAL

## 2020-01-01 ENCOUNTER — APPOINTMENT (OUTPATIENT)
Dept: NEUROLOGY | Facility: CLINIC | Age: 45
DRG: 003 | End: 2020-01-01
Attending: STUDENT IN AN ORGANIZED HEALTH CARE EDUCATION/TRAINING PROGRAM
Payer: COMMERCIAL

## 2020-01-01 ENCOUNTER — APPOINTMENT (OUTPATIENT)
Dept: CT IMAGING | Facility: CLINIC | Age: 45
DRG: 003 | End: 2020-01-01
Attending: NURSE PRACTITIONER
Payer: COMMERCIAL

## 2020-01-01 ENCOUNTER — TRANSFERRED RECORDS (OUTPATIENT)
Dept: HEALTH INFORMATION MANAGEMENT | Facility: CLINIC | Age: 45
End: 2020-01-01

## 2020-01-01 ENCOUNTER — APPOINTMENT (OUTPATIENT)
Dept: CT IMAGING | Facility: CLINIC | Age: 45
DRG: 003 | End: 2020-01-01
Attending: PHYSICIAN ASSISTANT
Payer: COMMERCIAL

## 2020-01-01 ENCOUNTER — APPOINTMENT (OUTPATIENT)
Dept: GENERAL RADIOLOGY | Facility: CLINIC | Age: 45
DRG: 003 | End: 2020-01-01
Payer: COMMERCIAL

## 2020-01-01 ENCOUNTER — APPOINTMENT (OUTPATIENT)
Dept: NEUROLOGY | Facility: CLINIC | Age: 45
DRG: 003 | End: 2020-01-01
Attending: NURSE PRACTITIONER
Payer: COMMERCIAL

## 2020-01-01 ENCOUNTER — APPOINTMENT (OUTPATIENT)
Dept: ULTRASOUND IMAGING | Facility: CLINIC | Age: 45
DRG: 003 | End: 2020-01-01
Attending: STUDENT IN AN ORGANIZED HEALTH CARE EDUCATION/TRAINING PROGRAM
Payer: COMMERCIAL

## 2020-01-01 ENCOUNTER — APPOINTMENT (OUTPATIENT)
Dept: ULTRASOUND IMAGING | Facility: CLINIC | Age: 45
DRG: 003 | End: 2020-01-01
Payer: COMMERCIAL

## 2020-01-01 ENCOUNTER — APPOINTMENT (OUTPATIENT)
Dept: CARDIOLOGY | Facility: CLINIC | Age: 45
DRG: 003 | End: 2020-01-01
Attending: NURSE PRACTITIONER
Payer: COMMERCIAL

## 2020-01-01 ENCOUNTER — HOSPITAL ENCOUNTER (INPATIENT)
Facility: CLINIC | Age: 45
LOS: 8 days | DRG: 003 | End: 2020-10-10
Admitting: INTERNAL MEDICINE
Payer: COMMERCIAL

## 2020-01-01 ENCOUNTER — APPOINTMENT (OUTPATIENT)
Dept: CARDIOLOGY | Facility: CLINIC | Age: 45
DRG: 003 | End: 2020-01-01
Attending: STUDENT IN AN ORGANIZED HEALTH CARE EDUCATION/TRAINING PROGRAM
Payer: COMMERCIAL

## 2020-01-01 DIAGNOSIS — Z98.890 STATUS POST CARDIAC SURGERY: Primary | ICD-10-CM

## 2020-01-01 DIAGNOSIS — I46.9 CARDIAC ARREST (H): ICD-10-CM

## 2020-01-01 LAB
ABO + RH BLD: NORMAL
ACETAMINOPHEN QUAL: NEGATIVE
ALBUMIN SERPL-MCNC: 2 G/DL (ref 3.4–5)
ALBUMIN SERPL-MCNC: 2 G/DL (ref 3.4–5)
ALBUMIN SERPL-MCNC: 2.1 G/DL (ref 3.4–5)
ALBUMIN SERPL-MCNC: 2.1 G/DL (ref 3.4–5)
ALBUMIN SERPL-MCNC: 2.2 G/DL (ref 3.4–5)
ALBUMIN SERPL-MCNC: 2.3 G/DL (ref 3.4–5)
ALBUMIN SERPL-MCNC: 2.4 G/DL (ref 3.4–5)
ALBUMIN SERPL-MCNC: 2.5 G/DL (ref 3.4–5)
ALBUMIN SERPL-MCNC: 2.6 G/DL (ref 3.4–5)
ALBUMIN SERPL-MCNC: 2.7 G/DL (ref 3.4–5)
ALBUMIN SERPL-MCNC: 2.7 G/DL (ref 3.4–5)
ALBUMIN SERPL-MCNC: 2.8 G/DL (ref 3.4–5)
ALBUMIN SERPL-MCNC: 2.9 G/DL (ref 3.4–5)
ALBUMIN SERPL-MCNC: 3.1 G/DL (ref 3.4–5)
ALBUMIN UR-MCNC: 30 MG/DL
ALBUMIN UR-MCNC: 30 MG/DL
ALP SERPL-CCNC: 101 U/L (ref 40–150)
ALP SERPL-CCNC: 101 U/L (ref 40–150)
ALP SERPL-CCNC: 107 U/L (ref 40–150)
ALP SERPL-CCNC: 17 U/L (ref 40–150)
ALP SERPL-CCNC: 30 U/L (ref 40–150)
ALP SERPL-CCNC: 36 U/L (ref 40–150)
ALP SERPL-CCNC: 38 U/L (ref 40–150)
ALP SERPL-CCNC: 39 U/L (ref 40–150)
ALP SERPL-CCNC: 44 U/L (ref 40–150)
ALP SERPL-CCNC: 48 U/L (ref 40–150)
ALP SERPL-CCNC: 51 U/L (ref 40–150)
ALP SERPL-CCNC: 51 U/L (ref 40–150)
ALP SERPL-CCNC: 52 U/L (ref 40–150)
ALP SERPL-CCNC: 57 U/L (ref 40–150)
ALP SERPL-CCNC: 57 U/L (ref 40–150)
ALP SERPL-CCNC: 60 U/L (ref 40–150)
ALP SERPL-CCNC: 62 U/L (ref 40–150)
ALP SERPL-CCNC: 73 U/L (ref 40–150)
ALP SERPL-CCNC: 78 U/L (ref 40–150)
ALP SERPL-CCNC: 84 U/L (ref 40–150)
ALP SERPL-CCNC: 85 U/L (ref 40–150)
ALP SERPL-CCNC: 87 U/L (ref 40–150)
ALP SERPL-CCNC: 91 U/L (ref 40–150)
ALP SERPL-CCNC: 92 U/L (ref 40–150)
ALP SERPL-CCNC: 96 U/L (ref 40–150)
ALP SERPL-CCNC: 98 U/L (ref 40–150)
ALP SERPL-CCNC: 98 U/L (ref 40–150)
ALP SERPL-CCNC: 99 U/L (ref 40–150)
ALT SERPL W P-5'-P-CCNC: 100 U/L (ref 0–50)
ALT SERPL W P-5'-P-CCNC: 100 U/L (ref 0–50)
ALT SERPL W P-5'-P-CCNC: 101 U/L (ref 0–50)
ALT SERPL W P-5'-P-CCNC: 103 U/L (ref 0–50)
ALT SERPL W P-5'-P-CCNC: 105 U/L (ref 0–50)
ALT SERPL W P-5'-P-CCNC: 108 U/L (ref 0–50)
ALT SERPL W P-5'-P-CCNC: 110 U/L (ref 0–50)
ALT SERPL W P-5'-P-CCNC: 115 U/L (ref 0–50)
ALT SERPL W P-5'-P-CCNC: 119 U/L (ref 0–50)
ALT SERPL W P-5'-P-CCNC: 120 U/L (ref 0–50)
ALT SERPL W P-5'-P-CCNC: 127 U/L (ref 0–50)
ALT SERPL W P-5'-P-CCNC: 135 U/L (ref 0–50)
ALT SERPL W P-5'-P-CCNC: 137 U/L (ref 0–50)
ALT SERPL W P-5'-P-CCNC: 139 U/L (ref 0–50)
ALT SERPL W P-5'-P-CCNC: 172 U/L (ref 0–50)
ALT SERPL W P-5'-P-CCNC: 188 U/L (ref 0–50)
ALT SERPL W P-5'-P-CCNC: 232 U/L (ref 0–50)
ALT SERPL W P-5'-P-CCNC: 76 U/L (ref 0–50)
ALT SERPL W P-5'-P-CCNC: 77 U/L (ref 0–50)
ALT SERPL W P-5'-P-CCNC: 80 U/L (ref 0–50)
ALT SERPL W P-5'-P-CCNC: 82 U/L (ref 0–50)
ALT SERPL W P-5'-P-CCNC: 85 U/L (ref 0–50)
ALT SERPL W P-5'-P-CCNC: 88 U/L (ref 0–50)
ALT SERPL W P-5'-P-CCNC: 90 U/L (ref 0–50)
ALT SERPL W P-5'-P-CCNC: 92 U/L (ref 0–50)
ALT SERPL W P-5'-P-CCNC: 94 U/L (ref 0–50)
ALT SERPL W P-5'-P-CCNC: 97 U/L (ref 0–50)
ALT SERPL W P-5'-P-CCNC: 98 U/L (ref 0–50)
AMOBARBITAL QUAL: NEGATIVE
AMORPH CRY #/AREA URNS HPF: ABNORMAL /HPF
AMPHETAMINES UR QL SCN: NEGATIVE
AMYLASE SERPL-CCNC: 101 U/L (ref 30–110)
ANGLE RATE OF CLOT STRENGTH: 46.2 DEGREES (ref 53–72)
ANGLE RATE OF CLOT STRENGTH: 58.2 DEGREES (ref 53–72)
ANION GAP SERPL CALCULATED.3IONS-SCNC: 10 MMOL/L (ref 3–14)
ANION GAP SERPL CALCULATED.3IONS-SCNC: 10 MMOL/L (ref 3–14)
ANION GAP SERPL CALCULATED.3IONS-SCNC: 13 MMOL/L (ref 3–14)
ANION GAP SERPL CALCULATED.3IONS-SCNC: 16 MMOL/L (ref 3–14)
ANION GAP SERPL CALCULATED.3IONS-SCNC: 3 MMOL/L (ref 3–14)
ANION GAP SERPL CALCULATED.3IONS-SCNC: 3 MMOL/L (ref 3–14)
ANION GAP SERPL CALCULATED.3IONS-SCNC: 4 MMOL/L (ref 3–14)
ANION GAP SERPL CALCULATED.3IONS-SCNC: 5 MMOL/L (ref 3–14)
ANION GAP SERPL CALCULATED.3IONS-SCNC: 7 MMOL/L (ref 3–14)
ANION GAP SERPL CALCULATED.3IONS-SCNC: 8 MMOL/L (ref 3–14)
ANION GAP SERPL CALCULATED.3IONS-SCNC: 9 MMOL/L (ref 3–14)
ANION GAP SERPL CALCULATED.3IONS-SCNC: 9 MMOL/L (ref 3–14)
ANISOCYTOSIS BLD QL SMEAR: SLIGHT
APPEARANCE UR: ABNORMAL
APPEARANCE UR: ABNORMAL
APTT PPP: 120 SEC (ref 22–37)
APTT PPP: 160 SEC (ref 22–37)
APTT PPP: 25 SEC (ref 22–37)
APTT PPP: 44 SEC (ref 22–37)
APTT PPP: 46 SEC (ref 22–37)
APTT PPP: 50 SEC (ref 22–37)
APTT PPP: 52 SEC (ref 22–37)
APTT PPP: 53 SEC (ref 22–37)
APTT PPP: 55 SEC (ref 22–37)
APTT PPP: 55 SEC (ref 22–37)
APTT PPP: 57 SEC (ref 22–37)
APTT PPP: 57 SEC (ref 22–37)
APTT PPP: 58 SEC (ref 22–37)
APTT PPP: 58 SEC (ref 22–37)
APTT PPP: 59 SEC (ref 22–37)
APTT PPP: 65 SEC (ref 22–37)
APTT PPP: 65 SEC (ref 22–37)
APTT PPP: 66 SEC (ref 22–37)
APTT PPP: 67 SEC (ref 22–37)
APTT PPP: 68 SEC (ref 22–37)
APTT PPP: 69 SEC (ref 22–37)
APTT PPP: 70 SEC (ref 22–37)
APTT PPP: 76 SEC (ref 22–37)
APTT PPP: 97 SEC (ref 22–37)
APTT PPP: 99 SEC (ref 22–37)
APTT PPP: >240 SEC (ref 22–37)
AST SERPL W P-5'-P-CCNC: 158 U/L (ref 0–45)
AST SERPL W P-5'-P-CCNC: 160 U/L (ref 0–45)
AST SERPL W P-5'-P-CCNC: 161 U/L (ref 0–45)
AST SERPL W P-5'-P-CCNC: 167 U/L (ref 0–45)
AST SERPL W P-5'-P-CCNC: 177 U/L (ref 0–45)
AST SERPL W P-5'-P-CCNC: 178 U/L (ref 0–45)
AST SERPL W P-5'-P-CCNC: 182 U/L (ref 0–45)
AST SERPL W P-5'-P-CCNC: 188 U/L (ref 0–45)
AST SERPL W P-5'-P-CCNC: 198 U/L (ref 0–45)
AST SERPL W P-5'-P-CCNC: 236 U/L (ref 0–45)
AST SERPL W P-5'-P-CCNC: 256 U/L (ref 0–45)
AST SERPL W P-5'-P-CCNC: 258 U/L (ref 0–45)
AST SERPL W P-5'-P-CCNC: 266 U/L (ref 0–45)
AST SERPL W P-5'-P-CCNC: 266 U/L (ref 0–45)
AST SERPL W P-5'-P-CCNC: 270 U/L (ref 0–45)
AST SERPL W P-5'-P-CCNC: 273 U/L (ref 0–45)
AST SERPL W P-5'-P-CCNC: 277 U/L (ref 0–45)
AST SERPL W P-5'-P-CCNC: 278 U/L (ref 0–45)
AST SERPL W P-5'-P-CCNC: 281 U/L (ref 0–45)
AST SERPL W P-5'-P-CCNC: 286 U/L (ref 0–45)
AST SERPL W P-5'-P-CCNC: 288 U/L (ref 0–45)
AST SERPL W P-5'-P-CCNC: 314 U/L (ref 0–45)
AST SERPL W P-5'-P-CCNC: 348 U/L (ref 0–45)
AST SERPL W P-5'-P-CCNC: 388 U/L (ref 0–45)
AST SERPL W P-5'-P-CCNC: 431 U/L (ref 0–45)
AST SERPL W P-5'-P-CCNC: 480 U/L (ref 0–45)
AST SERPL W P-5'-P-CCNC: 509 U/L (ref 0–45)
AST SERPL W P-5'-P-CCNC: 682 U/L (ref 0–45)
AST SERPL W P-5'-P-CCNC: 710 U/L (ref 0–45)
AST SERPL W P-5'-P-CCNC: 738 U/L (ref 0–45)
AT III ACT/NOR PPP CHRO: 42 % (ref 85–135)
AT III ACT/NOR PPP CHRO: 50 % (ref 85–135)
AT III ACT/NOR PPP CHRO: 53 % (ref 85–135)
AT III ACT/NOR PPP CHRO: 56 % (ref 85–135)
AT III ACT/NOR PPP CHRO: 62 % (ref 85–135)
AT III ACT/NOR PPP CHRO: 69 % (ref 85–135)
AT III ACT/NOR PPP CHRO: 69 % (ref 85–135)
AT III ACT/NOR PPP CHRO: 78 % (ref 85–135)
B-HCG SERPL-ACNC: <1 IU/L (ref 0–5)
BACTERIA #/AREA URNS HPF: ABNORMAL /HPF
BACTERIA #/AREA URNS HPF: ABNORMAL /HPF
BACTERIA SPEC CULT: ABNORMAL
BACTERIA SPEC CULT: NO GROWTH
BACTERIA SPEC CULT: NORMAL
BARBITAL QUAL: NEGATIVE
BARBITURATES UR QL: NEGATIVE
BASE DEFICIT BLDA-SCNC: 0.1 MMOL/L
BASE DEFICIT BLDA-SCNC: 0.2 MMOL/L
BASE DEFICIT BLDA-SCNC: 0.3 MMOL/L
BASE DEFICIT BLDA-SCNC: 0.4 MMOL/L
BASE DEFICIT BLDA-SCNC: 0.5 MMOL/L
BASE DEFICIT BLDA-SCNC: 0.5 MMOL/L
BASE DEFICIT BLDA-SCNC: 0.8 MMOL/L
BASE DEFICIT BLDA-SCNC: 1.1 MMOL/L
BASE DEFICIT BLDA-SCNC: 1.3 MMOL/L
BASE DEFICIT BLDA-SCNC: 1.4 MMOL/L
BASE DEFICIT BLDA-SCNC: 1.6 MMOL/L
BASE DEFICIT BLDA-SCNC: 1.8 MMOL/L
BASE DEFICIT BLDA-SCNC: 1.8 MMOL/L
BASE DEFICIT BLDA-SCNC: 1.9 MMOL/L
BASE DEFICIT BLDA-SCNC: 10.2 MMOL/L
BASE DEFICIT BLDA-SCNC: 11.7 MMOL/L
BASE DEFICIT BLDA-SCNC: 15 MMOL/L
BASE DEFICIT BLDA-SCNC: 15 MMOL/L
BASE DEFICIT BLDA-SCNC: 15.2 MMOL/L
BASE DEFICIT BLDA-SCNC: 17 MMOL/L
BASE DEFICIT BLDA-SCNC: 19.5 MMOL/L
BASE DEFICIT BLDA-SCNC: 2 MMOL/L
BASE DEFICIT BLDA-SCNC: 2.1 MMOL/L
BASE DEFICIT BLDA-SCNC: 2.2 MMOL/L
BASE DEFICIT BLDA-SCNC: 2.3 MMOL/L
BASE DEFICIT BLDA-SCNC: 2.3 MMOL/L
BASE DEFICIT BLDA-SCNC: 2.5 MMOL/L
BASE DEFICIT BLDA-SCNC: 2.6 MMOL/L
BASE DEFICIT BLDA-SCNC: 2.6 MMOL/L
BASE DEFICIT BLDA-SCNC: 2.7 MMOL/L
BASE DEFICIT BLDA-SCNC: 2.7 MMOL/L
BASE DEFICIT BLDA-SCNC: 2.8 MMOL/L
BASE DEFICIT BLDA-SCNC: 2.9 MMOL/L
BASE DEFICIT BLDA-SCNC: 2.9 MMOL/L
BASE DEFICIT BLDA-SCNC: 26.6 MMOL/L
BASE DEFICIT BLDA-SCNC: 3 MMOL/L
BASE DEFICIT BLDA-SCNC: 3.1 MMOL/L
BASE DEFICIT BLDA-SCNC: 3.1 MMOL/L
BASE DEFICIT BLDA-SCNC: 3.2 MMOL/L
BASE DEFICIT BLDA-SCNC: 3.2 MMOL/L
BASE DEFICIT BLDA-SCNC: 3.3 MMOL/L
BASE DEFICIT BLDA-SCNC: 3.3 MMOL/L
BASE DEFICIT BLDA-SCNC: 3.4 MMOL/L
BASE DEFICIT BLDA-SCNC: 3.4 MMOL/L
BASE DEFICIT BLDA-SCNC: 3.5 MMOL/L
BASE DEFICIT BLDA-SCNC: 3.7 MMOL/L
BASE DEFICIT BLDA-SCNC: 3.8 MMOL/L
BASE DEFICIT BLDA-SCNC: 3.9 MMOL/L
BASE DEFICIT BLDA-SCNC: 3.9 MMOL/L
BASE DEFICIT BLDA-SCNC: 4.2 MMOL/L
BASE DEFICIT BLDA-SCNC: 4.4 MMOL/L
BASE DEFICIT BLDA-SCNC: 4.4 MMOL/L
BASE DEFICIT BLDA-SCNC: 4.8 MMOL/L
BASE DEFICIT BLDA-SCNC: 5 MMOL/L
BASE DEFICIT BLDA-SCNC: 5 MMOL/L
BASE DEFICIT BLDA-SCNC: 5.2 MMOL/L
BASE DEFICIT BLDA-SCNC: 5.5 MMOL/L
BASE DEFICIT BLDA-SCNC: 5.6 MMOL/L
BASE DEFICIT BLDA-SCNC: 5.8 MMOL/L
BASE DEFICIT BLDA-SCNC: 5.8 MMOL/L
BASE DEFICIT BLDA-SCNC: 6 MMOL/L
BASE DEFICIT BLDA-SCNC: 6.1 MMOL/L
BASE DEFICIT BLDA-SCNC: 6.3 MMOL/L
BASE DEFICIT BLDA-SCNC: 6.4 MMOL/L
BASE DEFICIT BLDA-SCNC: 6.7 MMOL/L
BASE DEFICIT BLDA-SCNC: 7.5 MMOL/L
BASE DEFICIT BLDA-SCNC: 7.6 MMOL/L
BASE DEFICIT BLDA-SCNC: 8.6 MMOL/L
BASE DEFICIT BLDA-SCNC: 9.8 MMOL/L
BASE DEFICIT BLDA-SCNC: ABNORMAL MMOL/L
BASE DEFICIT BLDV-SCNC: 0.3 MMOL/L
BASE DEFICIT BLDV-SCNC: 1.1 MMOL/L
BASE DEFICIT BLDV-SCNC: 1.2 MMOL/L
BASE DEFICIT BLDV-SCNC: 1.4 MMOL/L
BASE DEFICIT BLDV-SCNC: 1.8 MMOL/L
BASE DEFICIT BLDV-SCNC: 14.1 MMOL/L
BASE DEFICIT BLDV-SCNC: 2.2 MMOL/L
BASE DEFICIT BLDV-SCNC: 2.7 MMOL/L
BASE DEFICIT BLDV-SCNC: 3.2 MMOL/L
BASE DEFICIT BLDV-SCNC: 3.4 MMOL/L
BASE DEFICIT BLDV-SCNC: 3.5 MMOL/L
BASE DEFICIT BLDV-SCNC: 4.2 MMOL/L
BASE DEFICIT BLDV-SCNC: 5.1 MMOL/L
BASE DEFICIT BLDV-SCNC: 6.8 MMOL/L
BASE EXCESS BLDA CALC-SCNC: 0 MMOL/L
BASE EXCESS BLDA CALC-SCNC: ABNORMAL MMOL/L
BASE EXCESS BLDV CALC-SCNC: 0.1 MMOL/L
BASOPHILS # BLD AUTO: 0.1 10E9/L (ref 0–0.2)
BASOPHILS # BLD AUTO: 0.1 10E9/L (ref 0–0.2)
BASOPHILS NFR BLD AUTO: 0.3 %
BASOPHILS NFR BLD AUTO: 0.9 %
BENZODIAZ UR QL: POSITIVE
BILIRUB DIRECT SERPL-MCNC: 0.7 MG/DL (ref 0–0.2)
BILIRUB SERPL-MCNC: 0.6 MG/DL (ref 0.2–1.3)
BILIRUB SERPL-MCNC: 0.7 MG/DL (ref 0.2–1.3)
BILIRUB SERPL-MCNC: 0.8 MG/DL (ref 0.2–1.3)
BILIRUB SERPL-MCNC: 0.9 MG/DL (ref 0.2–1.3)
BILIRUB SERPL-MCNC: 1 MG/DL (ref 0.2–1.3)
BILIRUB SERPL-MCNC: 1.1 MG/DL (ref 0.2–1.3)
BILIRUB SERPL-MCNC: 1.2 MG/DL (ref 0.2–1.3)
BILIRUB SERPL-MCNC: 1.3 MG/DL (ref 0.2–1.3)
BILIRUB SERPL-MCNC: 1.3 MG/DL (ref 0.2–1.3)
BILIRUB SERPL-MCNC: 1.7 MG/DL (ref 0.2–1.3)
BILIRUB UR QL STRIP: NEGATIVE
BILIRUB UR QL STRIP: NEGATIVE
BLD GP AB SCN SERPL QL: NORMAL
BLD GP AB SCN SERPL QL: NORMAL
BLD PROD TYP BPU: NORMAL
BLD UNIT ID BPU: 0
BLOOD BANK CMNT PATIENT-IMP: NORMAL
BLOOD BANK CMNT PATIENT-IMP: NORMAL
BLOOD PRODUCT CODE: NORMAL
BPU ID: NORMAL
BUN SERPL-MCNC: 18 MG/DL (ref 7–30)
BUN SERPL-MCNC: 24 MG/DL (ref 7–30)
BUN SERPL-MCNC: 29 MG/DL (ref 7–30)
BUN SERPL-MCNC: 31 MG/DL (ref 7–30)
BUN SERPL-MCNC: 34 MG/DL (ref 7–30)
BUN SERPL-MCNC: 36 MG/DL (ref 7–30)
BUN SERPL-MCNC: 36 MG/DL (ref 7–30)
BUN SERPL-MCNC: 41 MG/DL (ref 7–30)
BUN SERPL-MCNC: 42 MG/DL (ref 7–30)
BUN SERPL-MCNC: 43 MG/DL (ref 7–30)
BUN SERPL-MCNC: 43 MG/DL (ref 7–30)
BUN SERPL-MCNC: 44 MG/DL (ref 7–30)
BUN SERPL-MCNC: 45 MG/DL (ref 7–30)
BUN SERPL-MCNC: 45 MG/DL (ref 7–30)
BUN SERPL-MCNC: 46 MG/DL (ref 7–30)
BUN SERPL-MCNC: 46 MG/DL (ref 7–30)
BUN SERPL-MCNC: 47 MG/DL (ref 7–30)
BUN SERPL-MCNC: 51 MG/DL (ref 7–30)
BUN SERPL-MCNC: 52 MG/DL (ref 7–30)
BUN SERPL-MCNC: 55 MG/DL (ref 7–30)
BUN SERPL-MCNC: 55 MG/DL (ref 7–30)
BUN SERPL-MCNC: 59 MG/DL (ref 7–30)
BUN SERPL-MCNC: 60 MG/DL (ref 7–30)
BUN SERPL-MCNC: 63 MG/DL (ref 7–30)
BUN SERPL-MCNC: 65 MG/DL (ref 7–30)
BUN SERPL-MCNC: 67 MG/DL (ref 7–30)
BUN SERPL-MCNC: 71 MG/DL (ref 7–30)
BUN SERPL-MCNC: 81 MG/DL (ref 7–30)
BUN SERPL-MCNC: 81 MG/DL (ref 7–30)
BUN SERPL-MCNC: 83 MG/DL (ref 7–30)
BUTABARBITAL QUAL: NEGATIVE
BUTALBITAL QUAL: NEGATIVE
CA-I BLD-MCNC: 3.4 MG/DL (ref 4.4–5.2)
CA-I BLD-MCNC: 3.4 MG/DL (ref 4.4–5.2)
CA-I BLD-MCNC: 3.5 MG/DL (ref 4.4–5.2)
CA-I BLD-MCNC: 3.8 MG/DL (ref 4.4–5.2)
CA-I BLD-MCNC: 4.3 MG/DL (ref 4.4–5.2)
CA-I BLD-MCNC: 4.3 MG/DL (ref 4.4–5.2)
CA-I BLD-MCNC: 4.4 MG/DL (ref 4.4–5.2)
CA-I BLD-MCNC: 4.5 MG/DL (ref 4.4–5.2)
CA-I BLD-MCNC: 4.6 MG/DL (ref 4.4–5.2)
CA-I BLD-MCNC: 4.7 MG/DL (ref 4.4–5.2)
CA-I BLD-MCNC: 4.8 MG/DL (ref 4.4–5.2)
CA-I BLD-MCNC: 5 MG/DL (ref 4.4–5.2)
CA-I BLD-SCNC: 4.4 MG/DL (ref 4.4–5.2)
CA-I BLD-SCNC: 4.5 MG/DL (ref 4.4–5.2)
CA-I BLD-SCNC: 4.6 MG/DL (ref 4.4–5.2)
CA-I BLD-SCNC: 4.7 MG/DL (ref 4.4–5.2)
CA-I BLD-SCNC: 4.8 MG/DL (ref 4.4–5.2)
CA-I BLD-SCNC: 5 MG/DL (ref 4.4–5.2)
CAFFEINE QUAL: NEGATIVE
CALCIUM SERPL-MCNC: 5.5 MG/DL (ref 8.5–10.1)
CALCIUM SERPL-MCNC: 7 MG/DL (ref 8.5–10.1)
CALCIUM SERPL-MCNC: 7.2 MG/DL (ref 8.5–10.1)
CALCIUM SERPL-MCNC: 7.4 MG/DL (ref 8.5–10.1)
CALCIUM SERPL-MCNC: 7.4 MG/DL (ref 8.5–10.1)
CALCIUM SERPL-MCNC: 7.5 MG/DL (ref 8.5–10.1)
CALCIUM SERPL-MCNC: 7.6 MG/DL (ref 8.5–10.1)
CALCIUM SERPL-MCNC: 7.7 MG/DL (ref 8.5–10.1)
CALCIUM SERPL-MCNC: 7.7 MG/DL (ref 8.5–10.1)
CALCIUM SERPL-MCNC: 7.8 MG/DL (ref 8.5–10.1)
CALCIUM SERPL-MCNC: 7.9 MG/DL (ref 8.5–10.1)
CALCIUM SERPL-MCNC: 8 MG/DL (ref 8.5–10.1)
CALCIUM SERPL-MCNC: 8.1 MG/DL (ref 8.5–10.1)
CALCIUM SERPL-MCNC: 8.4 MG/DL (ref 8.5–10.1)
CANNABINOIDS UR QL SCN: NEGATIVE
CARBAMAZEPINE QUAL: NEGATIVE
CARISOPRODOL QUAL: NEGATIVE
CHLORIDE SERPL-SCNC: 113 MMOL/L (ref 94–109)
CHLORIDE SERPL-SCNC: 117 MMOL/L (ref 94–109)
CHLORIDE SERPL-SCNC: 117 MMOL/L (ref 94–109)
CHLORIDE SERPL-SCNC: 118 MMOL/L (ref 94–109)
CHLORIDE SERPL-SCNC: 119 MMOL/L (ref 94–109)
CHLORIDE SERPL-SCNC: 119 MMOL/L (ref 94–109)
CHLORIDE SERPL-SCNC: 120 MMOL/L (ref 94–109)
CHLORIDE SERPL-SCNC: 120 MMOL/L (ref 94–109)
CHLORIDE SERPL-SCNC: 121 MMOL/L (ref 94–109)
CHLORIDE SERPL-SCNC: 122 MMOL/L (ref 94–109)
CHLORIDE SERPL-SCNC: 125 MMOL/L (ref 94–109)
CHLORIDE SERPL-SCNC: 127 MMOL/L (ref 94–109)
CHLORIDE SERPL-SCNC: 129 MMOL/L (ref 94–109)
CHLORIDE SERPL-SCNC: 130 MMOL/L (ref 94–109)
CHLORIDE SERPL-SCNC: 131 MMOL/L (ref 94–109)
CHLORIDE SERPL-SCNC: 132 MMOL/L (ref 94–109)
CHLORIDE SERPL-SCNC: 132 MMOL/L (ref 94–109)
CHLORIDE SERPL-SCNC: 133 MMOL/L (ref 94–109)
CHLORIDE SERPL-SCNC: 133 MMOL/L (ref 94–109)
CHLORIDE SERPL-SCNC: 134 MMOL/L (ref 94–109)
CHLORIDE SERPL-SCNC: 136 MMOL/L (ref 94–109)
CHLORIDE SERPL-SCNC: 136 MMOL/L (ref 94–109)
CHLORPROPAMIDE UR-MCNC: NEGATIVE UG/ML
CHOLEST SERPL-MCNC: 68 MG/DL
CI HYPOCOAGULATION INDEX: 1.7 RATIO (ref 0–3)
CI HYPOCOAGULATION INDEX: 5.4 RATIO (ref 0–3)
CO2 BLD-SCNC: 20 MMOL/L (ref 21–28)
CO2 BLD-SCNC: 21 MMOL/L (ref 21–28)
CO2 BLD-SCNC: 22 MMOL/L (ref 21–28)
CO2 BLD-SCNC: 25 MMOL/L (ref 21–28)
CO2 SERPL-SCNC: 13 MMOL/L (ref 20–32)
CO2 SERPL-SCNC: 16 MMOL/L (ref 20–32)
CO2 SERPL-SCNC: 20 MMOL/L (ref 20–32)
CO2 SERPL-SCNC: 21 MMOL/L (ref 20–32)
CO2 SERPL-SCNC: 22 MMOL/L (ref 20–32)
CO2 SERPL-SCNC: 23 MMOL/L (ref 20–32)
CO2 SERPL-SCNC: 24 MMOL/L (ref 20–32)
CO2 SERPL-SCNC: 25 MMOL/L (ref 20–32)
CO2 SERPL-SCNC: 25 MMOL/L (ref 20–32)
COCAINE UR QL: NEGATIVE
COLOR UR AUTO: ABNORMAL
COLOR UR AUTO: YELLOW
CORTIS SERPL-MCNC: 24.3 UG/DL (ref 4–22)
CPB APPLIED: ABNORMAL
CREAT SERPL-MCNC: 0.86 MG/DL (ref 0.52–1.04)
CREAT SERPL-MCNC: 1.27 MG/DL (ref 0.52–1.04)
CREAT SERPL-MCNC: 1.47 MG/DL (ref 0.52–1.04)
CREAT SERPL-MCNC: 1.7 MG/DL (ref 0.52–1.04)
CREAT SERPL-MCNC: 1.76 MG/DL (ref 0.52–1.04)
CREAT SERPL-MCNC: 1.81 MG/DL (ref 0.52–1.04)
CREAT SERPL-MCNC: 1.96 MG/DL (ref 0.52–1.04)
CREAT SERPL-MCNC: 2.12 MG/DL (ref 0.52–1.04)
CREAT SERPL-MCNC: 2.12 MG/DL (ref 0.52–1.04)
CREAT SERPL-MCNC: 2.13 MG/DL (ref 0.52–1.04)
CREAT SERPL-MCNC: 2.16 MG/DL (ref 0.52–1.04)
CREAT SERPL-MCNC: 2.16 MG/DL (ref 0.52–1.04)
CREAT SERPL-MCNC: 2.18 MG/DL (ref 0.52–1.04)
CREAT SERPL-MCNC: 2.2 MG/DL (ref 0.52–1.04)
CREAT SERPL-MCNC: 2.22 MG/DL (ref 0.52–1.04)
CREAT SERPL-MCNC: 2.24 MG/DL (ref 0.52–1.04)
CREAT SERPL-MCNC: 2.25 MG/DL (ref 0.52–1.04)
CREAT SERPL-MCNC: 2.25 MG/DL (ref 0.52–1.04)
CREAT SERPL-MCNC: 2.29 MG/DL (ref 0.52–1.04)
CREAT SERPL-MCNC: 2.3 MG/DL (ref 0.52–1.04)
CREAT SERPL-MCNC: 2.33 MG/DL (ref 0.52–1.04)
CREAT SERPL-MCNC: 2.33 MG/DL (ref 0.52–1.04)
CREAT SERPL-MCNC: 2.36 MG/DL (ref 0.52–1.04)
CREAT SERPL-MCNC: 2.37 MG/DL (ref 0.52–1.04)
CREAT SERPL-MCNC: 2.51 MG/DL (ref 0.52–1.04)
CREAT SERPL-MCNC: 2.78 MG/DL (ref 0.52–1.04)
CREAT SERPL-MCNC: 3.03 MG/DL (ref 0.52–1.04)
CREAT SERPL-MCNC: 3.46 MG/DL (ref 0.52–1.04)
CREAT SERPL-MCNC: 3.69 MG/DL (ref 0.52–1.04)
CREAT SERPL-MCNC: 3.92 MG/DL (ref 0.52–1.04)
CRP SERPL-MCNC: 130 MG/L (ref 0–8)
CRP SERPL-MCNC: 170 MG/L (ref 0–8)
CRP SERPL-MCNC: 170 MG/L (ref 0–8)
CRP SERPL-MCNC: 19 MG/L (ref 0–8)
CRP SERPL-MCNC: 33 MG/L (ref 0–8)
CRP SERPL-MCNC: 50 MG/L (ref 0–8)
CRP SERPL-MCNC: 84 MG/L (ref 0–8)
CRP SERPL-MCNC: <2.9 MG/L (ref 0–8)
D DIMER PPP FEU-MCNC: 10.4 UG/ML FEU (ref 0–0.5)
D DIMER PPP FEU-MCNC: 15 UG/ML FEU (ref 0–0.5)
D DIMER PPP FEU-MCNC: 17.1 UG/ML FEU (ref 0–0.5)
D DIMER PPP FEU-MCNC: 18.4 UG/ML FEU (ref 0–0.5)
D DIMER PPP FEU-MCNC: 19.1 UG/ML FEU (ref 0–0.5)
D DIMER PPP FEU-MCNC: 3.9 UG/ML FEU (ref 0–0.5)
D DIMER PPP FEU-MCNC: 4 UG/ML FEU (ref 0–0.5)
D DIMER PPP FEU-MCNC: 4.1 UG/ML FEU (ref 0–0.5)
D DIMER PPP FEU-MCNC: 5.7 UG/ML FEU (ref 0–0.5)
D DIMER PPP FEU-MCNC: 6.3 UG/ML FEU (ref 0–0.5)
D DIMER PPP FEU-MCNC: 7.1 UG/ML FEU (ref 0–0.5)
D DIMER PPP FEU-MCNC: 9 UG/ML FEU (ref 0–0.5)
D DIMER PPP FEU-MCNC: 9.3 UG/ML FEU (ref 0–0.5)
D DIMER PPP FEU-MCNC: >20 UG/ML FEU (ref 0–0.5)
D DIMER PPP FEU-MCNC: >20 UG/ML FEU (ref 0–0.5)
DIFFERENTIAL METHOD BLD: ABNORMAL
DIFFERENTIAL METHOD BLD: ABNORMAL
DRUGS SERPL SCN: NEGATIVE
EOSINOPHIL # BLD AUTO: 0 10E9/L (ref 0–0.7)
EOSINOPHIL # BLD AUTO: 0.1 10E9/L (ref 0–0.7)
EOSINOPHIL NFR BLD AUTO: 0 %
EOSINOPHIL NFR BLD AUTO: 0.4 %
ERYTHROCYTE [DISTWIDTH] IN BLOOD BY AUTOMATED COUNT: 12.1 % (ref 10–15)
ERYTHROCYTE [DISTWIDTH] IN BLOOD BY AUTOMATED COUNT: 12.2 % (ref 10–15)
ERYTHROCYTE [DISTWIDTH] IN BLOOD BY AUTOMATED COUNT: 12.8 % (ref 10–15)
ERYTHROCYTE [DISTWIDTH] IN BLOOD BY AUTOMATED COUNT: 13.6 % (ref 10–15)
ERYTHROCYTE [DISTWIDTH] IN BLOOD BY AUTOMATED COUNT: 13.6 % (ref 10–15)
ERYTHROCYTE [DISTWIDTH] IN BLOOD BY AUTOMATED COUNT: 14 % (ref 10–15)
ERYTHROCYTE [DISTWIDTH] IN BLOOD BY AUTOMATED COUNT: 14 % (ref 10–15)
ERYTHROCYTE [DISTWIDTH] IN BLOOD BY AUTOMATED COUNT: 14.1 % (ref 10–15)
ERYTHROCYTE [DISTWIDTH] IN BLOOD BY AUTOMATED COUNT: 14.2 % (ref 10–15)
ERYTHROCYTE [DISTWIDTH] IN BLOOD BY AUTOMATED COUNT: 14.5 % (ref 10–15)
ERYTHROCYTE [DISTWIDTH] IN BLOOD BY AUTOMATED COUNT: 14.5 % (ref 10–15)
ERYTHROCYTE [DISTWIDTH] IN BLOOD BY AUTOMATED COUNT: 14.6 % (ref 10–15)
ERYTHROCYTE [DISTWIDTH] IN BLOOD BY AUTOMATED COUNT: 14.6 % (ref 10–15)
ERYTHROCYTE [DISTWIDTH] IN BLOOD BY AUTOMATED COUNT: 14.7 % (ref 10–15)
ERYTHROCYTE [DISTWIDTH] IN BLOOD BY AUTOMATED COUNT: 14.7 % (ref 10–15)
ERYTHROCYTE [DISTWIDTH] IN BLOOD BY AUTOMATED COUNT: 14.9 % (ref 10–15)
ERYTHROCYTE [DISTWIDTH] IN BLOOD BY AUTOMATED COUNT: 15.2 % (ref 10–15)
ERYTHROCYTE [DISTWIDTH] IN BLOOD BY AUTOMATED COUNT: 15.5 % (ref 10–15)
ERYTHROCYTE [DISTWIDTH] IN BLOOD BY AUTOMATED COUNT: 15.7 % (ref 10–15)
ERYTHROCYTE [DISTWIDTH] IN BLOOD BY AUTOMATED COUNT: 15.7 % (ref 10–15)
ERYTHROCYTE [DISTWIDTH] IN BLOOD BY AUTOMATED COUNT: 15.9 % (ref 10–15)
ERYTHROCYTE [DISTWIDTH] IN BLOOD BY AUTOMATED COUNT: 16 % (ref 10–15)
ERYTHROCYTE [DISTWIDTH] IN BLOOD BY AUTOMATED COUNT: 16.2 % (ref 10–15)
ERYTHROCYTE [SEDIMENTATION RATE] IN BLOOD BY WESTERGREN METHOD: 11 MM/H (ref 0–20)
ERYTHROCYTE [SEDIMENTATION RATE] IN BLOOD BY WESTERGREN METHOD: 27 MM/H (ref 0–20)
ERYTHROCYTE [SEDIMENTATION RATE] IN BLOOD BY WESTERGREN METHOD: 34 MM/H (ref 0–20)
ERYTHROCYTE [SEDIMENTATION RATE] IN BLOOD BY WESTERGREN METHOD: 70 MM/H (ref 0–20)
ERYTHROCYTE [SEDIMENTATION RATE] IN BLOOD BY WESTERGREN METHOD: 78 MM/H (ref 0–20)
ERYTHROCYTE [SEDIMENTATION RATE] IN BLOOD BY WESTERGREN METHOD: 80 MM/H (ref 0–20)
ERYTHROCYTE [SEDIMENTATION RATE] IN BLOOD BY WESTERGREN METHOD: 89 MM/H (ref 0–20)
ERYTHROCYTE [SEDIMENTATION RATE] IN BLOOD BY WESTERGREN METHOD: 89 MM/H (ref 0–20)
ETHANOL UR QL SCN: NEGATIVE
ETHCLORVYNOL QUAL: NEGATIVE
ETHINAMATE QUAL: NEGATIVE
ETHOSUXIMIDE QUAL: NEGATIVE
ETHOTOIN QUAL: NEGATIVE
FIBRINOGEN PPP-MCNC: 129 MG/DL (ref 200–420)
FIBRINOGEN PPP-MCNC: 148 MG/DL (ref 200–420)
FIBRINOGEN PPP-MCNC: 263 MG/DL (ref 200–420)
FIBRINOGEN PPP-MCNC: 374 MG/DL (ref 200–420)
FIBRINOGEN PPP-MCNC: 478 MG/DL (ref 200–420)
FIBRINOGEN PPP-MCNC: 483 MG/DL (ref 200–420)
FIBRINOGEN PPP-MCNC: 502 MG/DL (ref 200–420)
FIBRINOGEN PPP-MCNC: 518 MG/DL (ref 200–420)
FIBRINOGEN PPP-MCNC: 529 MG/DL (ref 200–420)
FIBRINOGEN PPP-MCNC: 535 MG/DL (ref 200–420)
FIBRINOGEN PPP-MCNC: 535 MG/DL (ref 200–420)
FIBRINOGEN PPP-MCNC: 540 MG/DL (ref 200–420)
FIBRINOGEN PPP-MCNC: 565 MG/DL (ref 200–420)
FIBRINOGEN PPP-MCNC: 592 MG/DL (ref 200–420)
FIBRINOGEN PPP-MCNC: 599 MG/DL (ref 200–420)
FIBRINOGEN PPP-MCNC: 76 MG/DL (ref 200–420)
FIBRINOGEN PPP-MCNC: <61 MG/DL (ref 200–420)
G ACTUAL CLOT STRENGTH: 2.6 KD/SC (ref 4.5–11)
G ACTUAL CLOT STRENGTH: 4.9 KD/SC (ref 4.5–11)
GFR SERPL CREATININE-BSD FRML MDRD: 13 ML/MIN/{1.73_M2}
GFR SERPL CREATININE-BSD FRML MDRD: 14 ML/MIN/{1.73_M2}
GFR SERPL CREATININE-BSD FRML MDRD: 15 ML/MIN/{1.73_M2}
GFR SERPL CREATININE-BSD FRML MDRD: 16 ML/MIN/{1.73_M2}
GFR SERPL CREATININE-BSD FRML MDRD: 18 ML/MIN/{1.73_M2}
GFR SERPL CREATININE-BSD FRML MDRD: 18 ML/MIN/{1.73_M2}
GFR SERPL CREATININE-BSD FRML MDRD: 19 ML/MIN/{1.73_M2}
GFR SERPL CREATININE-BSD FRML MDRD: 20 ML/MIN/{1.73_M2}
GFR SERPL CREATININE-BSD FRML MDRD: 20 ML/MIN/{1.73_M2}
GFR SERPL CREATININE-BSD FRML MDRD: 21 ML/MIN/{1.73_M2}
GFR SERPL CREATININE-BSD FRML MDRD: 23 ML/MIN/{1.73_M2}
GFR SERPL CREATININE-BSD FRML MDRD: 24 ML/MIN/{1.73_M2}
GFR SERPL CREATININE-BSD FRML MDRD: 24 ML/MIN/{1.73_M2}
GFR SERPL CREATININE-BSD FRML MDRD: 25 ML/MIN/{1.73_M2}
GFR SERPL CREATININE-BSD FRML MDRD: 26 ML/MIN/{1.73_M2}
GFR SERPL CREATININE-BSD FRML MDRD: 27 ML/MIN/{1.73_M2}
GFR SERPL CREATININE-BSD FRML MDRD: 28 ML/MIN/{1.73_M2}
GFR SERPL CREATININE-BSD FRML MDRD: 28 ML/MIN/{1.73_M2}
GFR SERPL CREATININE-BSD FRML MDRD: 30 ML/MIN/{1.73_M2}
GFR SERPL CREATININE-BSD FRML MDRD: ABNORMAL ML/MIN/{1.73_M2}
GLUCOSE BLD-MCNC: 114 MG/DL (ref 70–99)
GLUCOSE BLD-MCNC: 117 MG/DL (ref 70–99)
GLUCOSE BLD-MCNC: 119 MG/DL (ref 70–99)
GLUCOSE BLD-MCNC: 122 MG/DL (ref 70–99)
GLUCOSE BLD-MCNC: 122 MG/DL (ref 70–99)
GLUCOSE BLD-MCNC: 123 MG/DL (ref 70–99)
GLUCOSE BLD-MCNC: 123 MG/DL (ref 70–99)
GLUCOSE BLD-MCNC: 124 MG/DL (ref 70–99)
GLUCOSE BLD-MCNC: 125 MG/DL (ref 70–99)
GLUCOSE BLD-MCNC: 126 MG/DL (ref 70–99)
GLUCOSE BLD-MCNC: 127 MG/DL (ref 70–99)
GLUCOSE BLD-MCNC: 129 MG/DL (ref 70–99)
GLUCOSE BLD-MCNC: 130 MG/DL (ref 70–99)
GLUCOSE BLD-MCNC: 131 MG/DL (ref 70–99)
GLUCOSE BLD-MCNC: 134 MG/DL (ref 70–99)
GLUCOSE BLD-MCNC: 135 MG/DL (ref 70–99)
GLUCOSE BLD-MCNC: 136 MG/DL (ref 70–99)
GLUCOSE BLD-MCNC: 136 MG/DL (ref 70–99)
GLUCOSE BLD-MCNC: 137 MG/DL (ref 70–99)
GLUCOSE BLD-MCNC: 140 MG/DL (ref 70–99)
GLUCOSE BLD-MCNC: 142 MG/DL (ref 70–99)
GLUCOSE BLD-MCNC: 142 MG/DL (ref 70–99)
GLUCOSE BLD-MCNC: 145 MG/DL (ref 70–99)
GLUCOSE BLD-MCNC: 145 MG/DL (ref 70–99)
GLUCOSE BLD-MCNC: 146 MG/DL (ref 70–99)
GLUCOSE BLD-MCNC: 150 MG/DL (ref 70–99)
GLUCOSE BLD-MCNC: 151 MG/DL (ref 70–99)
GLUCOSE BLD-MCNC: 162 MG/DL (ref 70–99)
GLUCOSE BLD-MCNC: 163 MG/DL (ref 70–99)
GLUCOSE BLD-MCNC: 169 MG/DL (ref 70–99)
GLUCOSE BLD-MCNC: 175 MG/DL (ref 70–99)
GLUCOSE BLD-MCNC: 191 MG/DL (ref 70–99)
GLUCOSE BLD-MCNC: 199 MG/DL (ref 70–99)
GLUCOSE BLD-MCNC: 296 MG/DL (ref 70–99)
GLUCOSE BLD-MCNC: 296 MG/DL (ref 70–99)
GLUCOSE BLD-MCNC: 335 MG/DL (ref 70–99)
GLUCOSE BLD-MCNC: 362 MG/DL (ref 70–99)
GLUCOSE BLD-MCNC: 455 MG/DL (ref 70–99)
GLUCOSE BLD-MCNC: 455 MG/DL (ref 70–99)
GLUCOSE BLD-MCNC: 543 MG/DL (ref 70–99)
GLUCOSE BLD-MCNC: 96 MG/DL (ref 70–99)
GLUCOSE BLD-MCNC: 99 MG/DL (ref 70–99)
GLUCOSE BLDC GLUCOMTR-MCNC: 101 MG/DL (ref 70–99)
GLUCOSE BLDC GLUCOMTR-MCNC: 103 MG/DL (ref 70–99)
GLUCOSE BLDC GLUCOMTR-MCNC: 104 MG/DL (ref 70–99)
GLUCOSE BLDC GLUCOMTR-MCNC: 104 MG/DL (ref 70–99)
GLUCOSE BLDC GLUCOMTR-MCNC: 106 MG/DL (ref 70–99)
GLUCOSE BLDC GLUCOMTR-MCNC: 107 MG/DL (ref 70–99)
GLUCOSE BLDC GLUCOMTR-MCNC: 107 MG/DL (ref 70–99)
GLUCOSE BLDC GLUCOMTR-MCNC: 109 MG/DL (ref 70–99)
GLUCOSE BLDC GLUCOMTR-MCNC: 110 MG/DL (ref 70–99)
GLUCOSE BLDC GLUCOMTR-MCNC: 111 MG/DL (ref 70–99)
GLUCOSE BLDC GLUCOMTR-MCNC: 111 MG/DL (ref 70–99)
GLUCOSE BLDC GLUCOMTR-MCNC: 112 MG/DL (ref 70–99)
GLUCOSE BLDC GLUCOMTR-MCNC: 112 MG/DL (ref 70–99)
GLUCOSE BLDC GLUCOMTR-MCNC: 113 MG/DL (ref 70–99)
GLUCOSE BLDC GLUCOMTR-MCNC: 114 MG/DL (ref 70–99)
GLUCOSE BLDC GLUCOMTR-MCNC: 115 MG/DL (ref 70–99)
GLUCOSE BLDC GLUCOMTR-MCNC: 116 MG/DL (ref 70–99)
GLUCOSE BLDC GLUCOMTR-MCNC: 117 MG/DL (ref 70–99)
GLUCOSE BLDC GLUCOMTR-MCNC: 117 MG/DL (ref 70–99)
GLUCOSE BLDC GLUCOMTR-MCNC: 118 MG/DL (ref 70–99)
GLUCOSE BLDC GLUCOMTR-MCNC: 119 MG/DL (ref 70–99)
GLUCOSE BLDC GLUCOMTR-MCNC: 119 MG/DL (ref 70–99)
GLUCOSE BLDC GLUCOMTR-MCNC: 120 MG/DL (ref 70–99)
GLUCOSE BLDC GLUCOMTR-MCNC: 120 MG/DL (ref 70–99)
GLUCOSE BLDC GLUCOMTR-MCNC: 121 MG/DL (ref 70–99)
GLUCOSE BLDC GLUCOMTR-MCNC: 122 MG/DL (ref 70–99)
GLUCOSE BLDC GLUCOMTR-MCNC: 123 MG/DL (ref 70–99)
GLUCOSE BLDC GLUCOMTR-MCNC: 124 MG/DL (ref 70–99)
GLUCOSE BLDC GLUCOMTR-MCNC: 124 MG/DL (ref 70–99)
GLUCOSE BLDC GLUCOMTR-MCNC: 125 MG/DL (ref 70–99)
GLUCOSE BLDC GLUCOMTR-MCNC: 126 MG/DL (ref 70–99)
GLUCOSE BLDC GLUCOMTR-MCNC: 127 MG/DL (ref 70–99)
GLUCOSE BLDC GLUCOMTR-MCNC: 127 MG/DL (ref 70–99)
GLUCOSE BLDC GLUCOMTR-MCNC: 128 MG/DL (ref 70–99)
GLUCOSE BLDC GLUCOMTR-MCNC: 130 MG/DL (ref 70–99)
GLUCOSE BLDC GLUCOMTR-MCNC: 131 MG/DL (ref 70–99)
GLUCOSE BLDC GLUCOMTR-MCNC: 134 MG/DL (ref 70–99)
GLUCOSE BLDC GLUCOMTR-MCNC: 134 MG/DL (ref 70–99)
GLUCOSE BLDC GLUCOMTR-MCNC: 135 MG/DL (ref 70–99)
GLUCOSE BLDC GLUCOMTR-MCNC: 135 MG/DL (ref 70–99)
GLUCOSE BLDC GLUCOMTR-MCNC: 137 MG/DL (ref 70–99)
GLUCOSE BLDC GLUCOMTR-MCNC: 137 MG/DL (ref 70–99)
GLUCOSE BLDC GLUCOMTR-MCNC: 140 MG/DL (ref 70–99)
GLUCOSE BLDC GLUCOMTR-MCNC: 141 MG/DL (ref 70–99)
GLUCOSE BLDC GLUCOMTR-MCNC: 143 MG/DL (ref 70–99)
GLUCOSE BLDC GLUCOMTR-MCNC: 143 MG/DL (ref 70–99)
GLUCOSE BLDC GLUCOMTR-MCNC: 144 MG/DL (ref 70–99)
GLUCOSE BLDC GLUCOMTR-MCNC: 145 MG/DL (ref 70–99)
GLUCOSE BLDC GLUCOMTR-MCNC: 146 MG/DL (ref 70–99)
GLUCOSE BLDC GLUCOMTR-MCNC: 147 MG/DL (ref 70–99)
GLUCOSE BLDC GLUCOMTR-MCNC: 151 MG/DL (ref 70–99)
GLUCOSE BLDC GLUCOMTR-MCNC: 169 MG/DL (ref 70–99)
GLUCOSE BLDC GLUCOMTR-MCNC: 176 MG/DL (ref 70–99)
GLUCOSE BLDC GLUCOMTR-MCNC: 201 MG/DL (ref 70–99)
GLUCOSE BLDC GLUCOMTR-MCNC: 250 MG/DL (ref 70–99)
GLUCOSE BLDC GLUCOMTR-MCNC: 266 MG/DL (ref 70–99)
GLUCOSE BLDC GLUCOMTR-MCNC: 274 MG/DL (ref 70–99)
GLUCOSE BLDC GLUCOMTR-MCNC: 91 MG/DL (ref 70–99)
GLUCOSE BLDC GLUCOMTR-MCNC: 92 MG/DL (ref 70–99)
GLUCOSE BLDC GLUCOMTR-MCNC: 94 MG/DL (ref 70–99)
GLUCOSE BLDC GLUCOMTR-MCNC: 95 MG/DL (ref 70–99)
GLUCOSE BLDC GLUCOMTR-MCNC: 97 MG/DL (ref 70–99)
GLUCOSE BLDC GLUCOMTR-MCNC: 98 MG/DL (ref 70–99)
GLUCOSE SERPL-MCNC: 109 MG/DL (ref 70–99)
GLUCOSE SERPL-MCNC: 109 MG/DL (ref 70–99)
GLUCOSE SERPL-MCNC: 113 MG/DL (ref 70–99)
GLUCOSE SERPL-MCNC: 118 MG/DL (ref 70–99)
GLUCOSE SERPL-MCNC: 120 MG/DL (ref 70–99)
GLUCOSE SERPL-MCNC: 120 MG/DL (ref 70–99)
GLUCOSE SERPL-MCNC: 122 MG/DL (ref 70–99)
GLUCOSE SERPL-MCNC: 122 MG/DL (ref 70–99)
GLUCOSE SERPL-MCNC: 124 MG/DL (ref 70–99)
GLUCOSE SERPL-MCNC: 126 MG/DL (ref 70–99)
GLUCOSE SERPL-MCNC: 128 MG/DL (ref 70–99)
GLUCOSE SERPL-MCNC: 129 MG/DL (ref 70–99)
GLUCOSE SERPL-MCNC: 130 MG/DL (ref 70–99)
GLUCOSE SERPL-MCNC: 134 MG/DL (ref 70–99)
GLUCOSE SERPL-MCNC: 134 MG/DL (ref 70–99)
GLUCOSE SERPL-MCNC: 137 MG/DL (ref 70–99)
GLUCOSE SERPL-MCNC: 139 MG/DL (ref 70–99)
GLUCOSE SERPL-MCNC: 140 MG/DL (ref 70–99)
GLUCOSE SERPL-MCNC: 140 MG/DL (ref 70–99)
GLUCOSE SERPL-MCNC: 146 MG/DL (ref 70–99)
GLUCOSE SERPL-MCNC: 157 MG/DL (ref 70–99)
GLUCOSE SERPL-MCNC: 158 MG/DL (ref 70–99)
GLUCOSE SERPL-MCNC: 172 MG/DL (ref 70–99)
GLUCOSE SERPL-MCNC: 182 MG/DL (ref 70–99)
GLUCOSE SERPL-MCNC: 183 MG/DL (ref 70–99)
GLUCOSE SERPL-MCNC: 277 MG/DL (ref 70–99)
GLUCOSE SERPL-MCNC: 90 MG/DL (ref 70–99)
GLUCOSE SERPL-MCNC: 92 MG/DL (ref 70–99)
GLUCOSE UR STRIP-MCNC: 300 MG/DL
GLUCOSE UR STRIP-MCNC: NEGATIVE MG/DL
GLUTETHIMIDE QUAL: NEGATIVE
GRAM STN SPEC: NORMAL
GRAM STN SPEC: NORMAL
HBA1C MFR BLD: 5.3 % (ref 0–5.6)
HCO3 BLD-SCNC: 11 MMOL/L (ref 21–28)
HCO3 BLD-SCNC: 12 MMOL/L (ref 21–28)
HCO3 BLD-SCNC: 12 MMOL/L (ref 21–28)
HCO3 BLD-SCNC: 13 MMOL/L (ref 21–28)
HCO3 BLD-SCNC: 15 MMOL/L (ref 21–28)
HCO3 BLD-SCNC: 16 MMOL/L (ref 21–28)
HCO3 BLD-SCNC: 16 MMOL/L (ref 21–28)
HCO3 BLD-SCNC: 17 MMOL/L (ref 21–28)
HCO3 BLD-SCNC: 19 MMOL/L (ref 21–28)
HCO3 BLD-SCNC: 20 MMOL/L (ref 21–28)
HCO3 BLD-SCNC: 21 MMOL/L (ref 21–28)
HCO3 BLD-SCNC: 22 MMOL/L (ref 21–28)
HCO3 BLD-SCNC: 23 MMOL/L (ref 21–28)
HCO3 BLD-SCNC: 24 MMOL/L (ref 21–28)
HCO3 BLD-SCNC: 25 MMOL/L (ref 21–28)
HCO3 BLD-SCNC: 7 MMOL/L (ref 21–28)
HCO3 BLD-SCNC: ABNORMAL MMOL/L (ref 21–28)
HCO3 BLD-SCNC: ABNORMAL MMOL/L (ref 21–28)
HCO3 BLDA-SCNC: 11 MMOL/L (ref 21–28)
HCO3 BLDA-SCNC: 20 MMOL/L (ref 21–28)
HCO3 BLDA-SCNC: 20 MMOL/L (ref 21–28)
HCO3 BLDA-SCNC: 22 MMOL/L (ref 21–28)
HCO3 BLDA-SCNC: 23 MMOL/L (ref 21–28)
HCO3 BLDA-SCNC: 24 MMOL/L (ref 21–28)
HCO3 BLDA-SCNC: 24 MMOL/L (ref 21–28)
HCO3 BLDV-SCNC: 13 MMOL/L (ref 21–28)
HCO3 BLDV-SCNC: 21 MMOL/L (ref 21–28)
HCO3 BLDV-SCNC: 21 MMOL/L (ref 21–28)
HCO3 BLDV-SCNC: 22 MMOL/L (ref 21–28)
HCO3 BLDV-SCNC: 23 MMOL/L (ref 21–28)
HCO3 BLDV-SCNC: 24 MMOL/L (ref 21–28)
HCO3 BLDV-SCNC: 25 MMOL/L (ref 21–28)
HCO3 BLDV-SCNC: 25 MMOL/L (ref 21–28)
HCT VFR BLD AUTO: 19.5 % (ref 35–47)
HCT VFR BLD AUTO: 20.5 % (ref 35–47)
HCT VFR BLD AUTO: 20.6 % (ref 35–47)
HCT VFR BLD AUTO: 21 % (ref 35–47)
HCT VFR BLD AUTO: 21 % (ref 35–47)
HCT VFR BLD AUTO: 21.3 % (ref 35–47)
HCT VFR BLD AUTO: 21.6 % (ref 35–47)
HCT VFR BLD AUTO: 21.7 % (ref 35–47)
HCT VFR BLD AUTO: 21.9 % (ref 35–47)
HCT VFR BLD AUTO: 22.2 % (ref 35–47)
HCT VFR BLD AUTO: 22.6 % (ref 35–47)
HCT VFR BLD AUTO: 22.9 % (ref 35–47)
HCT VFR BLD AUTO: 23.2 % (ref 35–47)
HCT VFR BLD AUTO: 23.3 % (ref 35–47)
HCT VFR BLD AUTO: 23.3 % (ref 35–47)
HCT VFR BLD AUTO: 23.6 % (ref 35–47)
HCT VFR BLD AUTO: 23.8 % (ref 35–47)
HCT VFR BLD AUTO: 24.1 % (ref 35–47)
HCT VFR BLD AUTO: 24.2 % (ref 35–47)
HCT VFR BLD AUTO: 24.3 % (ref 35–47)
HCT VFR BLD AUTO: 24.5 % (ref 35–47)
HCT VFR BLD AUTO: 25.7 % (ref 35–47)
HCT VFR BLD AUTO: 25.8 % (ref 35–47)
HCT VFR BLD AUTO: 26.2 % (ref 35–47)
HCT VFR BLD AUTO: 27.1 % (ref 35–47)
HCT VFR BLD AUTO: 28.2 % (ref 35–47)
HCT VFR BLD AUTO: 28.2 % (ref 35–47)
HCT VFR BLD CALC: 20 %PCV (ref 35–47)
HCT VFR BLD CALC: 21 %PCV (ref 35–47)
HCT VFR BLD CALC: 22 %PCV (ref 35–47)
HCT VFR BLD CALC: 23 %PCV (ref 35–47)
HCT VFR BLD CALC: 24 %PCV (ref 35–47)
HCT VFR BLD CALC: 25 %PCV (ref 35–47)
HCT VFR BLD CALC: 26 %PCV (ref 35–47)
HCT VFR BLD CALC: 26 %PCV (ref 35–47)
HCT VFR BLD CALC: 29 %PCV (ref 35–47)
HCT VFR BLD CALC: 33 %PCV (ref 35–47)
HCT VFR BLD CALC: 35 %PCV (ref 35–47)
HDLC SERPL-MCNC: 26 MG/DL
HGB BLD CALC-MCNC: 11.2 G/DL (ref 11.7–15.7)
HGB BLD CALC-MCNC: 11.9 G/DL (ref 11.7–15.7)
HGB BLD CALC-MCNC: 6.8 G/DL (ref 11.7–15.7)
HGB BLD CALC-MCNC: 7.1 G/DL (ref 11.7–15.7)
HGB BLD CALC-MCNC: 7.5 G/DL (ref 11.7–15.7)
HGB BLD CALC-MCNC: 7.8 G/DL (ref 11.7–15.7)
HGB BLD CALC-MCNC: 8.2 G/DL (ref 11.7–15.7)
HGB BLD CALC-MCNC: 8.5 G/DL (ref 11.7–15.7)
HGB BLD CALC-MCNC: 8.8 G/DL (ref 11.7–15.7)
HGB BLD CALC-MCNC: 8.8 G/DL (ref 11.7–15.7)
HGB BLD CALC-MCNC: 9.9 G/DL (ref 11.7–15.7)
HGB BLD-MCNC: 10 G/DL (ref 11.7–15.7)
HGB BLD-MCNC: 10.7 G/DL (ref 11.7–15.7)
HGB BLD-MCNC: 12.1 G/DL (ref 11.7–15.7)
HGB BLD-MCNC: 12.2 G/DL (ref 11.7–15.7)
HGB BLD-MCNC: 17 G/DL (ref 11.7–15.7)
HGB BLD-MCNC: 6.5 G/DL (ref 11.7–15.7)
HGB BLD-MCNC: 6.7 G/DL (ref 11.7–15.7)
HGB BLD-MCNC: 6.8 G/DL (ref 11.7–15.7)
HGB BLD-MCNC: 7 G/DL (ref 11.7–15.7)
HGB BLD-MCNC: 7.1 G/DL (ref 11.7–15.7)
HGB BLD-MCNC: 7.2 G/DL (ref 11.7–15.7)
HGB BLD-MCNC: 7.2 G/DL (ref 11.7–15.7)
HGB BLD-MCNC: 7.3 G/DL (ref 11.7–15.7)
HGB BLD-MCNC: 7.3 G/DL (ref 11.7–15.7)
HGB BLD-MCNC: 7.4 G/DL (ref 11.7–15.7)
HGB BLD-MCNC: 7.5 G/DL (ref 11.7–15.7)
HGB BLD-MCNC: 7.6 G/DL (ref 11.7–15.7)
HGB BLD-MCNC: 7.7 G/DL (ref 11.7–15.7)
HGB BLD-MCNC: 7.8 G/DL (ref 11.7–15.7)
HGB BLD-MCNC: 7.8 G/DL (ref 11.7–15.7)
HGB BLD-MCNC: 7.9 G/DL (ref 11.7–15.7)
HGB BLD-MCNC: 8 G/DL (ref 11.7–15.7)
HGB BLD-MCNC: 8.5 G/DL (ref 11.7–15.7)
HGB BLD-MCNC: 8.9 G/DL (ref 11.7–15.7)
HGB BLD-MCNC: 9.1 G/DL (ref 11.7–15.7)
HGB BLD-MCNC: 9.1 G/DL (ref 11.7–15.7)
HGB BLD-MCNC: 9.4 G/DL (ref 11.7–15.7)
HGB BLD-MCNC: 9.7 G/DL (ref 11.7–15.7)
HGB FREE PLAS-MCNC: 100 MG/DL
HGB FREE PLAS-MCNC: 30 MG/DL
HGB FREE PLAS-MCNC: 30 MG/DL
HGB FREE PLAS-MCNC: <30 MG/DL
HGB UR QL STRIP: ABNORMAL
HGB UR QL STRIP: ABNORMAL
IBUPROFEN QUAL: NEGATIVE
IMM GRANULOCYTES # BLD: 0.3 10E9/L (ref 0–0.4)
IMM GRANULOCYTES NFR BLD: 1.3 %
INR PPP: 1.32 (ref 0.86–1.14)
INR PPP: 1.36 (ref 0.86–1.14)
INR PPP: 1.38 (ref 0.86–1.14)
INR PPP: 1.4 (ref 0.86–1.14)
INR PPP: 1.43 (ref 0.86–1.14)
INR PPP: 1.45 (ref 0.86–1.14)
INR PPP: 1.46 (ref 0.86–1.14)
INR PPP: 1.47 (ref 0.86–1.14)
INR PPP: 1.47 (ref 0.86–1.14)
INR PPP: 1.48 (ref 0.86–1.14)
INR PPP: 1.49 (ref 0.86–1.14)
INR PPP: 1.49 (ref 0.86–1.14)
INR PPP: 1.52 (ref 0.86–1.14)
INR PPP: 1.53 (ref 0.86–1.14)
INR PPP: 1.54 (ref 0.86–1.14)
INR PPP: 1.55 (ref 0.86–1.14)
INR PPP: 1.55 (ref 0.86–1.14)
INR PPP: 1.56 (ref 0.86–1.14)
INR PPP: 1.59 (ref 0.86–1.14)
INR PPP: 1.63 (ref 0.86–1.14)
INR PPP: 1.72 (ref 0.86–1.14)
INR PPP: 1.81 (ref 0.86–1.14)
INR PPP: 1.81 (ref 0.86–1.14)
INR PPP: 2.52 (ref 0.86–1.14)
INR PPP: 3.53 (ref 0.86–1.14)
INTERPRETATION ECG - MUSE: NORMAL
K TIME TO SPEC CLOT STRENGTH: 2.6 MINUTE (ref 1–3)
K TIME TO SPEC CLOT STRENGTH: 4.5 MINUTE (ref 1–3)
KCT BLD-ACNC: 112 SEC (ref 75–150)
KCT BLD-ACNC: 124 SEC (ref 75–150)
KCT BLD-ACNC: 141 SEC (ref 75–150)
KCT BLD-ACNC: 145 SEC (ref 75–150)
KCT BLD-ACNC: 150 SEC (ref 75–150)
KCT BLD-ACNC: 154 SEC (ref 75–150)
KCT BLD-ACNC: 158 SEC (ref 75–150)
KCT BLD-ACNC: 162 SEC (ref 75–150)
KCT BLD-ACNC: 166 SEC (ref 75–150)
KCT BLD-ACNC: 166 SEC (ref 75–150)
KCT BLD-ACNC: 167 SEC (ref 75–150)
KCT BLD-ACNC: 171 SEC (ref 75–150)
KCT BLD-ACNC: 175 SEC (ref 75–150)
KCT BLD-ACNC: 179 SEC (ref 75–150)
KCT BLD-ACNC: 183 SEC (ref 75–150)
KCT BLD-ACNC: 222 SEC (ref 75–150)
KCT BLD-ACNC: 302 SEC (ref 75–150)
KCT BLD-ACNC: 327 SEC (ref 75–150)
KETONES UR STRIP-MCNC: 10 MG/DL
KETONES UR STRIP-MCNC: NEGATIVE MG/DL
LABORATORY COMMENT REPORT: NORMAL
LABORATORY COMMENT REPORT: NORMAL
LACTATE BLD-SCNC: 0.7 MMOL/L (ref 0.7–2)
LACTATE BLD-SCNC: 0.9 MMOL/L (ref 0.7–2)
LACTATE BLD-SCNC: 0.9 MMOL/L (ref 0.7–2)
LACTATE BLD-SCNC: 1 MMOL/L (ref 0.7–2)
LACTATE BLD-SCNC: 1.1 MMOL/L (ref 0.7–2)
LACTATE BLD-SCNC: 1.1 MMOL/L (ref 0.7–2)
LACTATE BLD-SCNC: 1.2 MMOL/L (ref 0.7–2)
LACTATE BLD-SCNC: 1.3 MMOL/L (ref 0.7–2)
LACTATE BLD-SCNC: 1.3 MMOL/L (ref 0.7–2)
LACTATE BLD-SCNC: 1.4 MMOL/L (ref 0.7–2)
LACTATE BLD-SCNC: 1.4 MMOL/L (ref 0.7–2)
LACTATE BLD-SCNC: 1.5 MMOL/L (ref 0.7–2)
LACTATE BLD-SCNC: 1.5 MMOL/L (ref 0.7–2)
LACTATE BLD-SCNC: 1.6 MMOL/L (ref 0.7–2)
LACTATE BLD-SCNC: 1.6 MMOL/L (ref 0.7–2)
LACTATE BLD-SCNC: 10.4 MMOL/L (ref 0.7–2)
LACTATE BLD-SCNC: 10.4 MMOL/L (ref 0.7–2)
LACTATE BLD-SCNC: 17 MMOL/L (ref 0.7–2)
LACTATE BLD-SCNC: 18 MMOL/L (ref 0.7–2)
LACTATE BLD-SCNC: 19 MMOL/L (ref 0.7–2)
LACTATE BLD-SCNC: 2.2 MMOL/L (ref 0.7–2)
LACTATE BLD-SCNC: 2.3 MMOL/L (ref 0.7–2)
LACTATE BLD-SCNC: 3.9 MMOL/L (ref 0.7–2)
LACTATE BLD-SCNC: 4.1 MMOL/L (ref 0.7–2)
LACTATE BLD-SCNC: 7.4 MMOL/L (ref 0.7–2)
LACTATE BLD-SCNC: 7.8 MMOL/L (ref 0.7–2)
LACTATE BLD-SCNC: 8.3 MMOL/L (ref 0.7–2)
LACTATE BLD-SCNC: NORMAL MMOL/L (ref 0.7–2)
LDH SERPL L TO P-CCNC: 1082 U/L (ref 81–234)
LDH SERPL L TO P-CCNC: 1154 U/L (ref 81–234)
LDH SERPL L TO P-CCNC: 1166 U/L (ref 81–234)
LDH SERPL L TO P-CCNC: 1205 U/L (ref 81–234)
LDH SERPL L TO P-CCNC: 1238 U/L (ref 81–234)
LDH SERPL L TO P-CCNC: 1291 U/L (ref 81–234)
LDH SERPL L TO P-CCNC: 1295 U/L (ref 81–234)
LDH SERPL L TO P-CCNC: 942 U/L (ref 81–234)
LDLC SERPL CALC-MCNC: 24 MG/DL
LEUKOCYTE ESTERASE UR QL STRIP: ABNORMAL
LEUKOCYTE ESTERASE UR QL STRIP: ABNORMAL
LIPASE SERPL-CCNC: 335 U/L (ref 73–393)
LMWH PPP CHRO-ACNC: 0.1 IU/ML
LMWH PPP CHRO-ACNC: 0.17 IU/ML
LMWH PPP CHRO-ACNC: 0.19 IU/ML
LMWH PPP CHRO-ACNC: 0.22 IU/ML
LMWH PPP CHRO-ACNC: 0.24 IU/ML
LMWH PPP CHRO-ACNC: 0.25 IU/ML
LMWH PPP CHRO-ACNC: 0.26 IU/ML
LMWH PPP CHRO-ACNC: 0.26 IU/ML
LMWH PPP CHRO-ACNC: 0.27 IU/ML
LMWH PPP CHRO-ACNC: 0.3 IU/ML
LMWH PPP CHRO-ACNC: 0.34 IU/ML
LMWH PPP CHRO-ACNC: 0.35 IU/ML
LMWH PPP CHRO-ACNC: 0.39 IU/ML
LMWH PPP CHRO-ACNC: 0.43 IU/ML
LMWH PPP CHRO-ACNC: 0.48 IU/ML
LMWH PPP CHRO-ACNC: 0.52 IU/ML
LMWH PPP CHRO-ACNC: 0.52 IU/ML
LMWH PPP CHRO-ACNC: 0.53 IU/ML
LMWH PPP CHRO-ACNC: 0.57 IU/ML
LMWH PPP CHRO-ACNC: 0.57 IU/ML
LMWH PPP CHRO-ACNC: 0.58 IU/ML
LMWH PPP CHRO-ACNC: 0.58 IU/ML
LMWH PPP CHRO-ACNC: 0.6 IU/ML
LMWH PPP CHRO-ACNC: 0.61 IU/ML
LMWH PPP CHRO-ACNC: 0.62 IU/ML
LMWH PPP CHRO-ACNC: <0.1 IU/ML
LY30 LYSIS AT 30 MINUTES: 0 % (ref 0–8)
LY30 LYSIS AT 30 MINUTES: 0.8 % (ref 0–8)
LY60 LYSIS AT 60 MINUTES: 2.5 % (ref 0–15)
LY60 LYSIS AT 60 MINUTES: 3.4 % (ref 0–15)
LYMPHOCYTES # BLD AUTO: 2.6 10E9/L (ref 0.8–5.3)
LYMPHOCYTES # BLD AUTO: 3.9 10E9/L (ref 0.8–5.3)
LYMPHOCYTES NFR BLD AUTO: 13.1 %
LYMPHOCYTES NFR BLD AUTO: 25.4 %
Lab: ABNORMAL
Lab: NORMAL
MA MAXIMUM CLOT STRENGTH: 33.8 MM (ref 50–70)
MA MAXIMUM CLOT STRENGTH: 49.5 MM (ref 50–70)
MAGNESIUM SERPL-MCNC: 1.8 MG/DL (ref 1.6–2.3)
MAGNESIUM SERPL-MCNC: 1.8 MG/DL (ref 1.6–2.3)
MAGNESIUM SERPL-MCNC: 1.9 MG/DL (ref 1.6–2.3)
MAGNESIUM SERPL-MCNC: 2.1 MG/DL (ref 1.6–2.3)
MAGNESIUM SERPL-MCNC: 2.2 MG/DL (ref 1.6–2.3)
MAGNESIUM SERPL-MCNC: 2.3 MG/DL (ref 1.6–2.3)
MAGNESIUM SERPL-MCNC: 2.4 MG/DL (ref 1.6–2.3)
MAGNESIUM SERPL-MCNC: 2.5 MG/DL (ref 1.6–2.3)
MAGNESIUM SERPL-MCNC: 2.6 MG/DL (ref 1.6–2.3)
MAGNESIUM SERPL-MCNC: 2.7 MG/DL (ref 1.6–2.3)
MAGNESIUM SERPL-MCNC: 2.7 MG/DL (ref 1.6–2.3)
MCH RBC QN AUTO: 29.8 PG (ref 26.5–33)
MCH RBC QN AUTO: 29.9 PG (ref 26.5–33)
MCH RBC QN AUTO: 29.9 PG (ref 26.5–33)
MCH RBC QN AUTO: 30 PG (ref 26.5–33)
MCH RBC QN AUTO: 30 PG (ref 26.5–33)
MCH RBC QN AUTO: 30.2 PG (ref 26.5–33)
MCH RBC QN AUTO: 30.3 PG (ref 26.5–33)
MCH RBC QN AUTO: 30.4 PG (ref 26.5–33)
MCH RBC QN AUTO: 30.5 PG (ref 26.5–33)
MCH RBC QN AUTO: 30.7 PG (ref 26.5–33)
MCH RBC QN AUTO: 30.8 PG (ref 26.5–33)
MCH RBC QN AUTO: 30.9 PG (ref 26.5–33)
MCH RBC QN AUTO: 31 PG (ref 26.5–33)
MCH RBC QN AUTO: 31.1 PG (ref 26.5–33)
MCH RBC QN AUTO: 31.1 PG (ref 26.5–33)
MCH RBC QN AUTO: 31.3 PG (ref 26.5–33)
MCH RBC QN AUTO: 31.4 PG (ref 26.5–33)
MCH RBC QN AUTO: 31.4 PG (ref 26.5–33)
MCH RBC QN AUTO: 31.7 PG (ref 26.5–33)
MCH RBC QN AUTO: 32.2 PG (ref 26.5–33)
MCH RBC QN AUTO: 32.3 PG (ref 26.5–33)
MCHC RBC AUTO-ENTMCNC: 31 G/DL (ref 31.5–36.5)
MCHC RBC AUTO-ENTMCNC: 31.1 G/DL (ref 31.5–36.5)
MCHC RBC AUTO-ENTMCNC: 31.3 G/DL (ref 31.5–36.5)
MCHC RBC AUTO-ENTMCNC: 31.4 G/DL (ref 31.5–36.5)
MCHC RBC AUTO-ENTMCNC: 31.5 G/DL (ref 31.5–36.5)
MCHC RBC AUTO-ENTMCNC: 31.5 G/DL (ref 31.5–36.5)
MCHC RBC AUTO-ENTMCNC: 31.9 G/DL (ref 31.5–36.5)
MCHC RBC AUTO-ENTMCNC: 32.3 G/DL (ref 31.5–36.5)
MCHC RBC AUTO-ENTMCNC: 32.4 G/DL (ref 31.5–36.5)
MCHC RBC AUTO-ENTMCNC: 32.6 G/DL (ref 31.5–36.5)
MCHC RBC AUTO-ENTMCNC: 32.7 G/DL (ref 31.5–36.5)
MCHC RBC AUTO-ENTMCNC: 32.8 G/DL (ref 31.5–36.5)
MCHC RBC AUTO-ENTMCNC: 33.2 G/DL (ref 31.5–36.5)
MCHC RBC AUTO-ENTMCNC: 33.2 G/DL (ref 31.5–36.5)
MCHC RBC AUTO-ENTMCNC: 33.3 G/DL (ref 31.5–36.5)
MCHC RBC AUTO-ENTMCNC: 33.3 G/DL (ref 31.5–36.5)
MCHC RBC AUTO-ENTMCNC: 33.6 G/DL (ref 31.5–36.5)
MCHC RBC AUTO-ENTMCNC: 33.6 G/DL (ref 31.5–36.5)
MCHC RBC AUTO-ENTMCNC: 33.8 G/DL (ref 31.5–36.5)
MCHC RBC AUTO-ENTMCNC: 34.1 G/DL (ref 31.5–36.5)
MCHC RBC AUTO-ENTMCNC: 34.3 G/DL (ref 31.5–36.5)
MCHC RBC AUTO-ENTMCNC: 34.3 G/DL (ref 31.5–36.5)
MCHC RBC AUTO-ENTMCNC: 34.4 G/DL (ref 31.5–36.5)
MCHC RBC AUTO-ENTMCNC: 34.6 G/DL (ref 31.5–36.5)
MCHC RBC AUTO-ENTMCNC: 34.6 G/DL (ref 31.5–36.5)
MCHC RBC AUTO-ENTMCNC: 34.7 G/DL (ref 31.5–36.5)
MCHC RBC AUTO-ENTMCNC: 35 G/DL (ref 31.5–36.5)
MCHC RBC AUTO-ENTMCNC: 35 G/DL (ref 31.5–36.5)
MCV RBC AUTO: 88 FL (ref 78–100)
MCV RBC AUTO: 89 FL (ref 78–100)
MCV RBC AUTO: 90 FL (ref 78–100)
MCV RBC AUTO: 91 FL (ref 78–100)
MCV RBC AUTO: 92 FL (ref 78–100)
MCV RBC AUTO: 93 FL (ref 78–100)
MCV RBC AUTO: 94 FL (ref 78–100)
MCV RBC AUTO: 95 FL (ref 78–100)
MCV RBC AUTO: 95 FL (ref 78–100)
MCV RBC AUTO: 96 FL (ref 78–100)
MEPHENYTOIN QUAL: NEGATIVE
MEPHOBARBITAL QUAL: NEGATIVE
MEPROBAMATE QUAL: NEGATIVE
METAMYELOCYTES # BLD: 0.3 10E9/L
METAMYELOCYTES NFR BLD MANUAL: 1.8 %
METHAQUALONE QUAL: NEGATIVE
METHARBITAL QUAL: NEGATIVE
METHSUXIMIDE QUAL: NEGATIVE
METHYPRYLON QUAL: NEGATIVE
MICROCYTES BLD QL SMEAR: PRESENT
MISCELLANEOUS TEST: NORMAL
MONOCYTES # BLD AUTO: 0.2 10E9/L (ref 0–1.3)
MONOCYTES # BLD AUTO: 0.5 10E9/L (ref 0–1.3)
MONOCYTES NFR BLD AUTO: 1 %
MONOCYTES NFR BLD AUTO: 3.5 %
MUCOUS THREADS #/AREA URNS LPF: PRESENT /LPF
MUCOUS THREADS #/AREA URNS LPF: PRESENT /LPF
NEUTROPHILS # BLD AUTO: 10.5 10E9/L (ref 1.6–8.3)
NEUTROPHILS # BLD AUTO: 16.6 10E9/L (ref 1.6–8.3)
NEUTROPHILS NFR BLD AUTO: 68.4 %
NEUTROPHILS NFR BLD AUTO: 83.9 %
NITRATE UR QL: NEGATIVE
NITRATE UR QL: NEGATIVE
NONHDLC SERPL-MCNC: 42 MG/DL
NRBC # BLD AUTO: 0 10*3/UL
NRBC # BLD AUTO: 0.3 10*3/UL
NRBC BLD AUTO-RTO: 0 /100
NRBC BLD AUTO-RTO: 2 /100
NSE SERPL-MCNC: 101.8 UG/L (ref 3.7–8.9)
NSE SERPL-MCNC: 43.9 UG/L (ref 3.7–8.9)
NSE SERPL-MCNC: 53.7 UG/L (ref 3.7–8.9)
NSE SERPL-MCNC: NORMAL UG/L
NUM BPU REQUESTED: 2
NUM BPU REQUESTED: 4
NUM BPU REQUESTED: 5
NUM BPU REQUESTED: 8
O2/TOTAL GAS SETTING VFR VENT: 100 %
O2/TOTAL GAS SETTING VFR VENT: 40 %
O2/TOTAL GAS SETTING VFR VENT: 50 %
O2/TOTAL GAS SETTING VFR VENT: 60 %
O2/TOTAL GAS SETTING VFR VENT: 70 %
O2/TOTAL GAS SETTING VFR VENT: ABNORMAL %
OPIATES UR QL SCN: NEGATIVE
OXYHGB MFR BLD: 69 % (ref 92–100)
OXYHGB MFR BLD: 88 % (ref 92–100)
OXYHGB MFR BLD: 90 % (ref 92–100)
OXYHGB MFR BLD: 91 % (ref 92–100)
OXYHGB MFR BLD: 92 % (ref 92–100)
OXYHGB MFR BLD: 93 % (ref 92–100)
OXYHGB MFR BLD: 94 % (ref 92–100)
OXYHGB MFR BLD: 95 % (ref 92–100)
OXYHGB MFR BLD: 96 % (ref 92–100)
OXYHGB MFR BLD: 97 % (ref 92–100)
OXYHGB MFR BLD: 98 % (ref 92–100)
OXYHGB MFR BLD: 99 % (ref 92–100)
OXYHGB MFR BLD: 99 % (ref 92–100)
OXYHGB MFR BLDA: 96 % (ref 75–100)
OXYHGB MFR BLDA: 97 % (ref 75–100)
OXYHGB MFR BLDA: 98 % (ref 75–100)
OXYHGB MFR BLDA: 99 % (ref 75–100)
OXYHGB MFR BLDV: 64 %
OXYHGB MFR BLDV: 72 %
OXYHGB MFR BLDV: 75 %
OXYHGB MFR BLDV: 81 %
OXYHGB MFR BLDV: 82 %
OXYHGB MFR BLDV: 82 %
OXYHGB MFR BLDV: 83 %
OXYHGB MFR BLDV: 84 %
OXYHGB MFR BLDV: 85 %
OXYHGB MFR BLDV: 88 %
OXYHGB MFR BLDV: 88 %
OXYHGB MFR BLDV: 89 %
OXYHGB MFR BLDV: 92 %
OXYHGB MFR BLDV: 92 %
OXYHGB MFR BLDV: 93 %
PCO2 BLD: 23 MM HG (ref 35–45)
PCO2 BLD: 26 MM HG (ref 35–45)
PCO2 BLD: 27 MM HG (ref 35–45)
PCO2 BLD: 28 MM HG (ref 35–45)
PCO2 BLD: 29 MM HG (ref 35–45)
PCO2 BLD: 30 MM HG (ref 35–45)
PCO2 BLD: 31 MM HG (ref 35–45)
PCO2 BLD: 31 MM HG (ref 35–45)
PCO2 BLD: 32 MM HG (ref 35–45)
PCO2 BLD: 33 MM HG (ref 35–45)
PCO2 BLD: 34 MM HG (ref 35–45)
PCO2 BLD: 35 MM HG (ref 35–45)
PCO2 BLD: 36 MM HG (ref 35–45)
PCO2 BLD: 37 MM HG (ref 35–45)
PCO2 BLD: 38 MM HG (ref 35–45)
PCO2 BLD: 39 MM HG (ref 35–45)
PCO2 BLD: 40 MM HG (ref 35–45)
PCO2 BLD: 40 MM HG (ref 35–45)
PCO2 BLD: 41 MM HG (ref 35–45)
PCO2 BLD: 42 MM HG (ref 35–45)
PCO2 BLD: 43 MM HG (ref 35–45)
PCO2 BLD: 47 MM HG (ref 35–45)
PCO2 BLD: 52 MM HG (ref 35–45)
PCO2 BLD: 52 MM HG (ref 35–45)
PCO2 BLD: 61 MM HG (ref 35–45)
PCO2 BLDA: 28 MM HG (ref 35–45)
PCO2 BLDA: 33 MM HG (ref 35–45)
PCO2 BLDA: 34 MM HG (ref 35–45)
PCO2 BLDA: 35 MM HG (ref 35–45)
PCO2 BLDA: 37 MM HG (ref 35–45)
PCO2 BLDA: 37 MM HG (ref 35–45)
PCO2 BLDA: 38 MM HG (ref 35–45)
PCO2 BLDA: 40 MM HG (ref 35–45)
PCO2 BLDA: 41 MM HG (ref 35–45)
PCO2 BLDA: 42 MM HG (ref 35–45)
PCO2 BLDA: 42 MM HG (ref 35–45)
PCO2 BLDA: 43 MM HG (ref 35–45)
PCO2 BLDA: 44 MM HG (ref 35–45)
PCO2 BLDA: 47 MM HG (ref 35–45)
PCO2 BLDA: 54 MM HG (ref 35–45)
PCO2 BLDV: 36 MM HG (ref 40–50)
PCO2 BLDV: 36 MM HG (ref 40–50)
PCO2 BLDV: 37 MM HG (ref 40–50)
PCO2 BLDV: 38 MM HG (ref 40–50)
PCO2 BLDV: 38 MM HG (ref 40–50)
PCO2 BLDV: 40 MM HG (ref 40–50)
PCO2 BLDV: 41 MM HG (ref 40–50)
PCO2 BLDV: 42 MM HG (ref 40–50)
PCO2 BLDV: 43 MM HG (ref 40–50)
PCO2 BLDV: 44 MM HG (ref 40–50)
PCO2 BLDV: 44 MM HG (ref 40–50)
PCO2 BLDV: 45 MM HG (ref 40–50)
PCO2 BLDV: 47 MM HG (ref 40–50)
PCO2 BLDV: 50 MM HG (ref 40–50)
PCO2 BLDV: 56 MM HG (ref 40–50)
PENTOBARBITAL QUAL: NEGATIVE
PH BLD: 6.76 PH (ref 7.35–7.45)
PH BLD: 7.03 PH (ref 7.35–7.45)
PH BLD: 7.21 PH (ref 7.35–7.45)
PH BLD: 7.21 PH (ref 7.35–7.45)
PH BLD: 7.22 PH (ref 7.35–7.45)
PH BLD: 7.25 PH (ref 7.35–7.45)
PH BLD: 7.27 PH (ref 7.35–7.45)
PH BLD: 7.28 PH (ref 7.35–7.45)
PH BLD: 7.28 PH (ref 7.35–7.45)
PH BLD: 7.3 PH (ref 7.35–7.45)
PH BLD: 7.3 PH (ref 7.35–7.45)
PH BLD: 7.31 PH (ref 7.35–7.45)
PH BLD: 7.32 PH (ref 7.35–7.45)
PH BLD: 7.33 PH (ref 7.35–7.45)
PH BLD: 7.33 PH (ref 7.35–7.45)
PH BLD: 7.34 PH (ref 7.35–7.45)
PH BLD: 7.34 PH (ref 7.35–7.45)
PH BLD: 7.35 PH (ref 7.35–7.45)
PH BLD: 7.36 PH (ref 7.35–7.45)
PH BLD: 7.37 PH (ref 7.35–7.45)
PH BLD: 7.38 PH (ref 7.35–7.45)
PH BLD: 7.39 PH (ref 7.35–7.45)
PH BLD: 7.4 PH (ref 7.35–7.45)
PH BLD: 7.41 PH (ref 7.35–7.45)
PH BLD: 7.42 PH (ref 7.35–7.45)
PH BLD: 7.43 PH (ref 7.35–7.45)
PH BLD: 7.44 PH (ref 7.35–7.45)
PH BLD: 7.44 PH (ref 7.35–7.45)
PH BLD: 7.45 PH (ref 7.35–7.45)
PH BLD: 7.45 PH (ref 7.35–7.45)
PH BLD: 7.46 PH (ref 7.35–7.45)
PH BLD: 7.47 PH (ref 7.35–7.45)
PH BLD: 7.48 PH (ref 7.35–7.45)
PH BLD: 7.48 PH (ref 7.35–7.45)
PH BLD: 7.49 PH (ref 7.35–7.45)
PH BLD: 7.49 PH (ref 7.35–7.45)
PH BLD: 7.5 PH (ref 7.35–7.45)
PH BLD: 7.5 PH (ref 7.35–7.45)
PH BLD: <6.75 PH (ref 7.35–7.45)
PH BLD: <6.75 PH (ref 7.35–7.45)
PH BLDA: 7.19 PH (ref 7.35–7.45)
PH BLDA: 7.21 PH (ref 7.35–7.45)
PH BLDA: 7.28 PH (ref 7.35–7.45)
PH BLDA: 7.31 PH (ref 7.35–7.45)
PH BLDA: 7.33 PH (ref 7.35–7.45)
PH BLDA: 7.33 PH (ref 7.35–7.45)
PH BLDA: 7.34 PH (ref 7.35–7.45)
PH BLDA: 7.38 PH (ref 7.35–7.45)
PH BLDA: 7.39 PH (ref 7.35–7.45)
PH BLDA: 7.41 PH (ref 7.35–7.45)
PH BLDA: 7.42 PH (ref 7.35–7.45)
PH BLDA: 7.43 PH (ref 7.35–7.45)
PH BLDA: 7.46 PH (ref 7.35–7.45)
PH BLDV: 7.17 PH (ref 7.32–7.43)
PH BLDV: 7.19 PH (ref 7.32–7.43)
PH BLDV: 7.28 PH (ref 7.32–7.43)
PH BLDV: 7.31 PH (ref 7.32–7.43)
PH BLDV: 7.32 PH (ref 7.32–7.43)
PH BLDV: 7.33 PH (ref 7.32–7.43)
PH BLDV: 7.34 PH (ref 7.32–7.43)
PH BLDV: 7.35 PH (ref 7.32–7.43)
PH BLDV: 7.39 PH (ref 7.32–7.43)
PH BLDV: 7.4 PH (ref 7.32–7.43)
PH BLDV: 7.41 PH (ref 7.32–7.43)
PH BLDV: 7.42 PH (ref 7.32–7.43)
PH BLDV: 7.42 PH (ref 7.32–7.43)
PH UR STRIP: 5.5 PH (ref 5–7)
PH UR STRIP: 5.5 PH (ref 5–7)
PHENACETIN QUAL: NEGATIVE
PHENOBARBITAL QUAL: NEGATIVE
PHENSUXIMIDE QUAL: NEGATIVE
PHENYTOIN QUAL: NEGATIVE
PHOSPHATE SERPL-MCNC: 1.2 MG/DL (ref 2.5–4.5)
PHOSPHATE SERPL-MCNC: 2.1 MG/DL (ref 2.5–4.5)
PHOSPHATE SERPL-MCNC: 2.7 MG/DL (ref 2.5–4.5)
PHOSPHATE SERPL-MCNC: 3 MG/DL (ref 2.5–4.5)
PHOSPHATE SERPL-MCNC: 3.3 MG/DL (ref 2.5–4.5)
PHOSPHATE SERPL-MCNC: 3.5 MG/DL (ref 2.5–4.5)
PHOSPHATE SERPL-MCNC: 4.9 MG/DL (ref 2.5–4.5)
PHOSPHATE SERPL-MCNC: 5 MG/DL (ref 2.5–4.5)
PHOSPHATE SERPL-MCNC: 5 MG/DL (ref 2.5–4.5)
PHOSPHATE SERPL-MCNC: 5.2 MG/DL (ref 2.5–4.5)
PLATELET # BLD AUTO: 123 10E9/L (ref 150–450)
PLATELET # BLD AUTO: 186 10E9/L (ref 150–450)
PLATELET # BLD AUTO: 216 10E9/L (ref 150–450)
PLATELET # BLD AUTO: 60 10E9/L (ref 150–450)
PLATELET # BLD AUTO: 65 10E9/L (ref 150–450)
PLATELET # BLD AUTO: 68 10E9/L (ref 150–450)
PLATELET # BLD AUTO: 68 10E9/L (ref 150–450)
PLATELET # BLD AUTO: 69 10E9/L (ref 150–450)
PLATELET # BLD AUTO: 70 10E9/L (ref 150–450)
PLATELET # BLD AUTO: 72 10E9/L (ref 150–450)
PLATELET # BLD AUTO: 72 10E9/L (ref 150–450)
PLATELET # BLD AUTO: 73 10E9/L (ref 150–450)
PLATELET # BLD AUTO: 73 10E9/L (ref 150–450)
PLATELET # BLD AUTO: 76 10E9/L (ref 150–450)
PLATELET # BLD AUTO: 76 10E9/L (ref 150–450)
PLATELET # BLD AUTO: 78 10E9/L (ref 150–450)
PLATELET # BLD AUTO: 79 10E9/L (ref 150–450)
PLATELET # BLD AUTO: 80 10E9/L (ref 150–450)
PLATELET # BLD AUTO: 81 10E9/L (ref 150–450)
PLATELET # BLD AUTO: 82 10E9/L (ref 150–450)
PLATELET # BLD AUTO: 83 10E9/L (ref 150–450)
PLATELET # BLD AUTO: 83 10E9/L (ref 150–450)
PLATELET # BLD AUTO: 84 10E9/L (ref 150–450)
PLATELET # BLD AUTO: 86 10E9/L (ref 150–450)
PLATELET # BLD EST: ABNORMAL 10*3/UL
PLATELET INHIBITION WITH AA: 100 %
PLATELET INHIBITION WITH ADP: 93 %
PO2 BLD: 101 MM HG (ref 80–105)
PO2 BLD: 102 MM HG (ref 80–105)
PO2 BLD: 103 MM HG (ref 80–105)
PO2 BLD: 104 MM HG (ref 80–105)
PO2 BLD: 104 MM HG (ref 80–105)
PO2 BLD: 106 MM HG (ref 80–105)
PO2 BLD: 109 MM HG (ref 80–105)
PO2 BLD: 109 MM HG (ref 80–105)
PO2 BLD: 111 MM HG (ref 80–105)
PO2 BLD: 112 MM HG (ref 80–105)
PO2 BLD: 112 MM HG (ref 80–105)
PO2 BLD: 114 MM HG (ref 80–105)
PO2 BLD: 114 MM HG (ref 80–105)
PO2 BLD: 116 MM HG (ref 80–105)
PO2 BLD: 116 MM HG (ref 80–105)
PO2 BLD: 117 MM HG (ref 80–105)
PO2 BLD: 118 MM HG (ref 80–105)
PO2 BLD: 119 MM HG (ref 80–105)
PO2 BLD: 127 MM HG (ref 80–105)
PO2 BLD: 130 MM HG (ref 80–105)
PO2 BLD: 132 MM HG (ref 80–105)
PO2 BLD: 134 MM HG (ref 80–105)
PO2 BLD: 134 MM HG (ref 80–105)
PO2 BLD: 141 MM HG (ref 80–105)
PO2 BLD: 165 MM HG (ref 80–105)
PO2 BLD: 195 MM HG (ref 80–105)
PO2 BLD: 256 MM HG (ref 80–105)
PO2 BLD: 274 MM HG (ref 80–105)
PO2 BLD: 425 MM HG (ref 80–105)
PO2 BLD: 59 MM HG (ref 80–105)
PO2 BLD: 62 MM HG (ref 80–105)
PO2 BLD: 63 MM HG (ref 80–105)
PO2 BLD: 65 MM HG (ref 80–105)
PO2 BLD: 67 MM HG (ref 80–105)
PO2 BLD: 68 MM HG (ref 80–105)
PO2 BLD: 68 MM HG (ref 80–105)
PO2 BLD: 70 MM HG (ref 80–105)
PO2 BLD: 72 MM HG (ref 80–105)
PO2 BLD: 73 MM HG (ref 80–105)
PO2 BLD: 74 MM HG (ref 80–105)
PO2 BLD: 77 MM HG (ref 80–105)
PO2 BLD: 78 MM HG (ref 80–105)
PO2 BLD: 79 MM HG (ref 80–105)
PO2 BLD: 81 MM HG (ref 80–105)
PO2 BLD: 82 MM HG (ref 80–105)
PO2 BLD: 83 MM HG (ref 80–105)
PO2 BLD: 84 MM HG (ref 80–105)
PO2 BLD: 85 MM HG (ref 80–105)
PO2 BLD: 85 MM HG (ref 80–105)
PO2 BLD: 86 MM HG (ref 80–105)
PO2 BLD: 86 MM HG (ref 80–105)
PO2 BLD: 87 MM HG (ref 80–105)
PO2 BLD: 88 MM HG (ref 80–105)
PO2 BLD: 89 MM HG (ref 80–105)
PO2 BLD: 89 MM HG (ref 80–105)
PO2 BLD: 93 MM HG (ref 80–105)
PO2 BLD: 94 MM HG (ref 80–105)
PO2 BLD: 94 MM HG (ref 80–105)
PO2 BLD: 95 MM HG (ref 80–105)
PO2 BLD: 96 MM HG (ref 80–105)
PO2 BLD: 97 MM HG (ref 80–105)
PO2 BLD: 98 MM HG (ref 80–105)
PO2 BLD: 98 MM HG (ref 80–105)
PO2 BLD: 99 MM HG (ref 80–105)
PO2 BLD: >600 MM HG (ref 80–105)
PO2 BLDA: 138 MM HG (ref 80–105)
PO2 BLDA: 204 MM HG (ref 80–105)
PO2 BLDA: 237 MM HG (ref 80–105)
PO2 BLDA: 249 MM HG (ref 80–105)
PO2 BLDA: 258 MM HG (ref 80–105)
PO2 BLDA: 263 MM HG (ref 80–105)
PO2 BLDA: 321 MM HG (ref 80–105)
PO2 BLDA: 351 MM HG (ref 80–105)
PO2 BLDA: 435 MM HG (ref 80–105)
PO2 BLDA: 453 MM HG (ref 80–105)
PO2 BLDA: 474 MM HG (ref 80–105)
PO2 BLDA: 497 MM HG (ref 80–105)
PO2 BLDA: 499 MM HG (ref 80–105)
PO2 BLDA: 526 MM HG (ref 80–105)
PO2 BLDA: 89 MM HG (ref 80–105)
PO2 BLDV: 42 MM HG (ref 25–47)
PO2 BLDV: 42 MM HG (ref 25–47)
PO2 BLDV: 45 MM HG (ref 25–47)
PO2 BLDV: 46 MM HG (ref 25–47)
PO2 BLDV: 49 MM HG (ref 25–47)
PO2 BLDV: 56 MM HG (ref 25–47)
PO2 BLDV: 59 MM HG (ref 25–47)
PO2 BLDV: 61 MM HG (ref 25–47)
PO2 BLDV: 62 MM HG (ref 25–47)
PO2 BLDV: 65 MM HG (ref 25–47)
PO2 BLDV: 69 MM HG (ref 25–47)
PO2 BLDV: 70 MM HG (ref 25–47)
POIKILOCYTOSIS BLD QL SMEAR: ABNORMAL
POLYCHROMASIA BLD QL SMEAR: ABNORMAL
POTASSIUM BLD-SCNC: 3 MMOL/L (ref 3.4–5.3)
POTASSIUM BLD-SCNC: 3.4 MMOL/L (ref 3.4–5.3)
POTASSIUM BLD-SCNC: 3.4 MMOL/L (ref 3.4–5.3)
POTASSIUM BLD-SCNC: 3.5 MMOL/L (ref 3.4–5.3)
POTASSIUM BLD-SCNC: 3.6 MMOL/L (ref 3.4–5.3)
POTASSIUM BLD-SCNC: 3.7 MMOL/L (ref 3.4–5.3)
POTASSIUM BLD-SCNC: 3.9 MMOL/L (ref 3.4–5.3)
POTASSIUM BLD-SCNC: 3.9 MMOL/L (ref 3.4–5.3)
POTASSIUM BLD-SCNC: 4 MMOL/L (ref 3.4–5.3)
POTASSIUM BLD-SCNC: 4 MMOL/L (ref 3.4–5.3)
POTASSIUM BLD-SCNC: 4.1 MMOL/L (ref 3.4–5.3)
POTASSIUM BLD-SCNC: 4.1 MMOL/L (ref 3.4–5.3)
POTASSIUM BLD-SCNC: 4.2 MMOL/L (ref 3.4–5.3)
POTASSIUM BLD-SCNC: 4.3 MMOL/L (ref 3.4–5.3)
POTASSIUM BLD-SCNC: 4.4 MMOL/L (ref 3.4–5.3)
POTASSIUM BLD-SCNC: 4.5 MMOL/L (ref 3.4–5.3)
POTASSIUM BLD-SCNC: 4.5 MMOL/L (ref 3.4–5.3)
POTASSIUM BLD-SCNC: 4.6 MMOL/L (ref 3.4–5.3)
POTASSIUM BLD-SCNC: 6 MMOL/L (ref 3.4–5.3)
POTASSIUM SERPL-SCNC: 3.1 MMOL/L (ref 3.4–5.3)
POTASSIUM SERPL-SCNC: 3.2 MMOL/L (ref 3.4–5.3)
POTASSIUM SERPL-SCNC: 3.4 MMOL/L (ref 3.4–5.3)
POTASSIUM SERPL-SCNC: 3.5 MMOL/L (ref 3.4–5.3)
POTASSIUM SERPL-SCNC: 3.6 MMOL/L (ref 3.4–5.3)
POTASSIUM SERPL-SCNC: 3.7 MMOL/L (ref 3.4–5.3)
POTASSIUM SERPL-SCNC: 3.8 MMOL/L (ref 3.4–5.3)
POTASSIUM SERPL-SCNC: 3.8 MMOL/L (ref 3.4–5.3)
POTASSIUM SERPL-SCNC: 3.9 MMOL/L (ref 3.4–5.3)
POTASSIUM SERPL-SCNC: 4 MMOL/L (ref 3.4–5.3)
POTASSIUM SERPL-SCNC: 4 MMOL/L (ref 3.4–5.3)
POTASSIUM SERPL-SCNC: 4.1 MMOL/L (ref 3.4–5.3)
POTASSIUM SERPL-SCNC: 4.2 MMOL/L (ref 3.4–5.3)
POTASSIUM SERPL-SCNC: 4.5 MMOL/L (ref 3.4–5.3)
POTASSIUM SERPL-SCNC: 4.5 MMOL/L (ref 3.4–5.3)
POTASSIUM SERPL-SCNC: 4.6 MMOL/L (ref 3.4–5.3)
POTASSIUM SERPL-SCNC: 4.7 MMOL/L (ref 3.4–5.3)
POTASSIUM SERPL-SCNC: 5 MMOL/L (ref 3.4–5.3)
PRIMIDONE QUAL: NEGATIVE
PROCALCITONIN SERPL-MCNC: 0.08 NG/ML
PROCALCITONIN SERPL-MCNC: 48.58 NG/ML
PROT SERPL-MCNC: 4 G/DL (ref 6.8–8.8)
PROT SERPL-MCNC: 4.4 G/DL (ref 6.8–8.8)
PROT SERPL-MCNC: 4.6 G/DL (ref 6.8–8.8)
PROT SERPL-MCNC: 4.6 G/DL (ref 6.8–8.8)
PROT SERPL-MCNC: 4.7 G/DL (ref 6.8–8.8)
PROT SERPL-MCNC: 4.9 G/DL (ref 6.8–8.8)
PROT SERPL-MCNC: 5 G/DL (ref 6.8–8.8)
PROT SERPL-MCNC: 5.1 G/DL (ref 6.8–8.8)
PROT SERPL-MCNC: 5.2 G/DL (ref 6.8–8.8)
PROT SERPL-MCNC: 5.3 G/DL (ref 6.8–8.8)
PROT SERPL-MCNC: 5.4 G/DL (ref 6.8–8.8)
PROT SERPL-MCNC: 5.4 G/DL (ref 6.8–8.8)
PROT SERPL-MCNC: 5.5 G/DL (ref 6.8–8.8)
PROT SERPL-MCNC: 5.6 G/DL (ref 6.8–8.8)
PROT SERPL-MCNC: 5.6 G/DL (ref 6.8–8.8)
PROT SERPL-MCNC: 5.8 G/DL (ref 6.8–8.8)
PROT SERPL-MCNC: 5.8 G/DL (ref 6.8–8.8)
R TIME UNTIL CLOT FORMS: 5.2 MINUTE (ref 5–10)
R TIME UNTIL CLOT FORMS: 5.4 MINUTE (ref 5–10)
RADIOLOGIST FLAGS: ABNORMAL
RBC # BLD AUTO: 2.11 10E12/L (ref 3.8–5.2)
RBC # BLD AUTO: 2.22 10E12/L (ref 3.8–5.2)
RBC # BLD AUTO: 2.24 10E12/L (ref 3.8–5.2)
RBC # BLD AUTO: 2.24 10E12/L (ref 3.8–5.2)
RBC # BLD AUTO: 2.26 10E12/L (ref 3.8–5.2)
RBC # BLD AUTO: 2.29 10E12/L (ref 3.8–5.2)
RBC # BLD AUTO: 2.29 10E12/L (ref 3.8–5.2)
RBC # BLD AUTO: 2.32 10E12/L (ref 3.8–5.2)
RBC # BLD AUTO: 2.38 10E12/L (ref 3.8–5.2)
RBC # BLD AUTO: 2.42 10E12/L (ref 3.8–5.2)
RBC # BLD AUTO: 2.43 10E12/L (ref 3.8–5.2)
RBC # BLD AUTO: 2.44 10E12/L (ref 3.8–5.2)
RBC # BLD AUTO: 2.45 10E12/L (ref 3.8–5.2)
RBC # BLD AUTO: 2.45 10E12/L (ref 3.8–5.2)
RBC # BLD AUTO: 2.47 10E12/L (ref 3.8–5.2)
RBC # BLD AUTO: 2.54 10E12/L (ref 3.8–5.2)
RBC # BLD AUTO: 2.55 10E12/L (ref 3.8–5.2)
RBC # BLD AUTO: 2.58 10E12/L (ref 3.8–5.2)
RBC # BLD AUTO: 2.61 10E12/L (ref 3.8–5.2)
RBC # BLD AUTO: 2.61 10E12/L (ref 3.8–5.2)
RBC # BLD AUTO: 2.62 10E12/L (ref 3.8–5.2)
RBC # BLD AUTO: 2.64 10E12/L (ref 3.8–5.2)
RBC # BLD AUTO: 2.67 10E12/L (ref 3.8–5.2)
RBC # BLD AUTO: 2.68 10E12/L (ref 3.8–5.2)
RBC # BLD AUTO: 2.76 10E12/L (ref 3.8–5.2)
RBC # BLD AUTO: 2.89 10E12/L (ref 3.8–5.2)
RBC # BLD AUTO: 2.95 10E12/L (ref 3.8–5.2)
RBC # BLD AUTO: 3 10E12/L (ref 3.8–5.2)
RBC # BLD AUTO: 3 10E12/L (ref 3.8–5.2)
RBC # BLD AUTO: 3.01 10E12/L (ref 3.8–5.2)
RBC #/AREA URNS AUTO: 4 /HPF (ref 0–2)
RBC #/AREA URNS AUTO: 8 /HPF (ref 0–2)
S100 CA BINDING PROTEIN B SER-MCNC: 1763 NG/L (ref 0–96)
S100 CA BINDING PROTEIN B SER-MCNC: 2447 NG/L (ref 0–96)
SALICYLATE QUAL: NEGATIVE
SAO2 % BLDA FROM PO2: 100 % (ref 92–100)
SAO2 % BLDA FROM PO2: 94 % (ref 92–100)
SAO2 % BLDA FROM PO2: 95 % (ref 92–100)
SAO2 % BLDA FROM PO2: 97 % (ref 92–100)
SAO2 % BLDA FROM PO2: 98 % (ref 92–100)
SAO2 % BLDA FROM PO2: 99 % (ref 92–100)
SARS-COV-2 RNA SPEC QL NAA+PROBE: NEGATIVE
SARS-COV-2 RNA SPEC QL NAA+PROBE: NEGATIVE
SARS-COV-2 RNA SPEC QL NAA+PROBE: NORMAL
SARS-COV-2 RNA SPEC QL NAA+PROBE: NORMAL
SECOBARBITAL QUAL: NEGATIVE
SODIUM BLD-SCNC: 140 MMOL/L (ref 133–144)
SODIUM BLD-SCNC: 140 MMOL/L (ref 133–144)
SODIUM BLD-SCNC: 142 MMOL/L (ref 133–144)
SODIUM BLD-SCNC: 142 MMOL/L (ref 133–144)
SODIUM BLD-SCNC: 145 MMOL/L (ref 133–144)
SODIUM BLD-SCNC: 159 MMOL/L (ref 133–144)
SODIUM BLD-SCNC: 159 MMOL/L (ref 133–144)
SODIUM BLD-SCNC: 160 MMOL/L (ref 133–144)
SODIUM BLD-SCNC: 160 MMOL/L (ref 133–144)
SODIUM BLD-SCNC: 161 MMOL/L (ref 133–144)
SODIUM BLD-SCNC: 162 MMOL/L (ref 133–144)
SODIUM BLD-SCNC: 163 MMOL/L (ref 133–144)
SODIUM BLD-SCNC: 164 MMOL/L (ref 133–144)
SODIUM BLD-SCNC: 164 MMOL/L (ref 133–144)
SODIUM BLD-SCNC: 165 MMOL/L (ref 133–144)
SODIUM BLD-SCNC: 166 MMOL/L (ref 133–144)
SODIUM SERPL-SCNC: 142 MMOL/L (ref 133–144)
SODIUM SERPL-SCNC: 146 MMOL/L (ref 133–144)
SODIUM SERPL-SCNC: 147 MMOL/L (ref 133–144)
SODIUM SERPL-SCNC: 148 MMOL/L (ref 133–144)
SODIUM SERPL-SCNC: 148 MMOL/L (ref 133–144)
SODIUM SERPL-SCNC: 149 MMOL/L (ref 133–144)
SODIUM SERPL-SCNC: 150 MMOL/L (ref 133–144)
SODIUM SERPL-SCNC: 150 MMOL/L (ref 133–144)
SODIUM SERPL-SCNC: 151 MMOL/L (ref 133–144)
SODIUM SERPL-SCNC: 153 MMOL/L (ref 133–144)
SODIUM SERPL-SCNC: 154 MMOL/L (ref 133–144)
SODIUM SERPL-SCNC: 156 MMOL/L (ref 133–144)
SODIUM SERPL-SCNC: 156 MMOL/L (ref 133–144)
SODIUM SERPL-SCNC: 157 MMOL/L (ref 133–144)
SODIUM SERPL-SCNC: 158 MMOL/L (ref 133–144)
SODIUM SERPL-SCNC: 159 MMOL/L (ref 133–144)
SODIUM SERPL-SCNC: 160 MMOL/L (ref 133–144)
SODIUM SERPL-SCNC: 160 MMOL/L (ref 133–144)
SODIUM SERPL-SCNC: 161 MMOL/L (ref 133–144)
SODIUM SERPL-SCNC: 162 MMOL/L (ref 133–144)
SODIUM SERPL-SCNC: 162 MMOL/L (ref 133–144)
SODIUM SERPL-SCNC: 163 MMOL/L (ref 133–144)
SODIUM SERPL-SCNC: 164 MMOL/L (ref 133–144)
SODIUM SERPL-SCNC: 164 MMOL/L (ref 133–144)
SOURCE: ABNORMAL
SOURCE: ABNORMAL
SP GR UR STRIP: 1.01 (ref 1–1.03)
SP GR UR STRIP: 1.01 (ref 1–1.03)
SPECIMEN EXP DATE BLD: NORMAL
SPECIMEN EXP DATE BLD: NORMAL
SPECIMEN SOURCE: ABNORMAL
SPECIMEN SOURCE: NORMAL
SQUAMOUS #/AREA URNS AUTO: 1 /HPF (ref 0–1)
SQUAMOUS #/AREA URNS AUTO: 1 /HPF (ref 0–1)
TALBUTAL QUAL: NEGATIVE
THEOPHYLLINE QUAL: NEGATIVE
THIOPENTAL QUAL: NEGATIVE
TRANS CELLS #/AREA URNS HPF: 1 /HPF (ref 0–1)
TRANSFUSION STATUS PATIENT QL: NORMAL
TRIGL SERPL-MCNC: 89 MG/DL
TROPONIN I SERPL-MCNC: 101.3 UG/L (ref 0–0.04)
TROPONIN I SERPL-MCNC: 113.37 UG/L (ref 0–0.04)
TROPONIN I SERPL-MCNC: 126.96 UG/L (ref 0–0.04)
TROPONIN I SERPL-MCNC: 136.43 UG/L (ref 0–0.04)
TROPONIN I SERPL-MCNC: 149.48 UG/L (ref 0–0.04)
TROPONIN I SERPL-MCNC: 150.26 UG/L (ref 0–0.04)
TROPONIN I SERPL-MCNC: 17.88 UG/L (ref 0–0.04)
TROPONIN I SERPL-MCNC: 172.87 UG/L (ref 0–0.04)
TROPONIN I SERPL-MCNC: 19.66 UG/L (ref 0–0.04)
TROPONIN I SERPL-MCNC: 19.74 UG/L (ref 0–0.04)
TROPONIN I SERPL-MCNC: 19.86 UG/L (ref 0–0.04)
TROPONIN I SERPL-MCNC: 20.07 UG/L (ref 0–0.04)
TROPONIN I SERPL-MCNC: 20.25 UG/L (ref 0–0.04)
TROPONIN I SERPL-MCNC: 26.93 UG/L (ref 0–0.04)
TROPONIN I SERPL-MCNC: 29.04 UG/L (ref 0–0.04)
TROPONIN I SERPL-MCNC: 31.73 UG/L (ref 0–0.04)
TROPONIN I SERPL-MCNC: 35.93 UG/L (ref 0–0.04)
TROPONIN I SERPL-MCNC: 46.98 UG/L (ref 0–0.04)
TROPONIN I SERPL-MCNC: 56.11 UG/L (ref 0–0.04)
TROPONIN I SERPL-MCNC: 58.42 UG/L (ref 0–0.04)
TROPONIN I SERPL-MCNC: 68.61 UG/L (ref 0–0.04)
TROPONIN I SERPL-MCNC: 70.29 UG/L (ref 0–0.04)
TROPONIN I SERPL-MCNC: 80.34 UG/L (ref 0–0.04)
TROPONIN I SERPL-MCNC: 81.94 UG/L (ref 0–0.04)
TROPONIN I SERPL-MCNC: >200 UG/L (ref 0–0.04)
TROPONIN I SERPL-MCNC: >40 UG/L (ref 0–0.04)
TYBAMATE QUAL: NEGATIVE
UROBILINOGEN UR STRIP-MCNC: NORMAL MG/DL (ref 0–2)
UROBILINOGEN UR STRIP-MCNC: NORMAL MG/DL (ref 0–2)
VALPROIC ACID QUAL: NEGATIVE
VANCOMYCIN SERPL-MCNC: 12.3 MG/L
WBC # BLD AUTO: 10.2 10E9/L (ref 4–11)
WBC # BLD AUTO: 12.9 10E9/L (ref 4–11)
WBC # BLD AUTO: 13 10E9/L (ref 4–11)
WBC # BLD AUTO: 13.6 10E9/L (ref 4–11)
WBC # BLD AUTO: 14.3 10E9/L (ref 4–11)
WBC # BLD AUTO: 14.7 10E9/L (ref 4–11)
WBC # BLD AUTO: 15.2 10E9/L (ref 4–11)
WBC # BLD AUTO: 15.3 10E9/L (ref 4–11)
WBC # BLD AUTO: 15.3 10E9/L (ref 4–11)
WBC # BLD AUTO: 15.6 10E9/L (ref 4–11)
WBC # BLD AUTO: 16.1 10E9/L (ref 4–11)
WBC # BLD AUTO: 16.3 10E9/L (ref 4–11)
WBC # BLD AUTO: 16.6 10E9/L (ref 4–11)
WBC # BLD AUTO: 17.1 10E9/L (ref 4–11)
WBC # BLD AUTO: 17.3 10E9/L (ref 4–11)
WBC # BLD AUTO: 17.5 10E9/L (ref 4–11)
WBC # BLD AUTO: 17.5 10E9/L (ref 4–11)
WBC # BLD AUTO: 17.7 10E9/L (ref 4–11)
WBC # BLD AUTO: 18 10E9/L (ref 4–11)
WBC # BLD AUTO: 18.3 10E9/L (ref 4–11)
WBC # BLD AUTO: 18.6 10E9/L (ref 4–11)
WBC # BLD AUTO: 18.7 10E9/L (ref 4–11)
WBC # BLD AUTO: 18.8 10E9/L (ref 4–11)
WBC # BLD AUTO: 18.9 10E9/L (ref 4–11)
WBC # BLD AUTO: 19.2 10E9/L (ref 4–11)
WBC # BLD AUTO: 19.2 10E9/L (ref 4–11)
WBC # BLD AUTO: 19.6 10E9/L (ref 4–11)
WBC # BLD AUTO: 19.7 10E9/L (ref 4–11)
WBC # BLD AUTO: 20.5 10E9/L (ref 4–11)
WBC # BLD AUTO: 21.4 10E9/L (ref 4–11)
WBC #/AREA URNS AUTO: 29 /HPF (ref 0–5)
WBC #/AREA URNS AUTO: 7 /HPF (ref 0–5)

## 2020-01-01 PROCEDURE — 71045 X-RAY EXAM CHEST 1 VIEW: CPT

## 2020-01-01 PROCEDURE — 70450 CT HEAD/BRAIN W/O DYE: CPT

## 2020-01-01 PROCEDURE — 82330 ASSAY OF CALCIUM: CPT | Performed by: STUDENT IN AN ORGANIZED HEALTH CARE EDUCATION/TRAINING PROGRAM

## 2020-01-01 PROCEDURE — 85347 COAGULATION TIME ACTIVATED: CPT

## 2020-01-01 PROCEDURE — 95718 EEG PHYS/QHP 2-12 HR W/VEEG: CPT | Mod: GC | Performed by: PSYCHIATRY & NEUROLOGY

## 2020-01-01 PROCEDURE — 86850 RBC ANTIBODY SCREEN: CPT | Performed by: INTERNAL MEDICINE

## 2020-01-01 PROCEDURE — C9113 INJ PANTOPRAZOLE SODIUM, VIA: HCPCS | Performed by: STUDENT IN AN ORGANIZED HEALTH CARE EDUCATION/TRAINING PROGRAM

## 2020-01-01 PROCEDURE — 84100 ASSAY OF PHOSPHORUS: CPT | Performed by: STUDENT IN AN ORGANIZED HEALTH CARE EDUCATION/TRAINING PROGRAM

## 2020-01-01 PROCEDURE — 250N000009 HC RX 250: Performed by: INTERNAL MEDICINE

## 2020-01-01 PROCEDURE — 93306 TTE W/DOPPLER COMPLETE: CPT | Mod: 26 | Performed by: INTERNAL MEDICINE

## 2020-01-01 PROCEDURE — 85379 FIBRIN DEGRADATION QUANT: CPT | Performed by: STUDENT IN AN ORGANIZED HEALTH CARE EDUCATION/TRAINING PROGRAM

## 2020-01-01 PROCEDURE — 99291 CRITICAL CARE FIRST HOUR: CPT | Mod: 25 | Performed by: INTERNAL MEDICINE

## 2020-01-01 PROCEDURE — 85300 ANTITHROMBIN III ACTIVITY: CPT | Performed by: STUDENT IN AN ORGANIZED HEALTH CARE EDUCATION/TRAINING PROGRAM

## 2020-01-01 PROCEDURE — 93005 ELECTROCARDIOGRAM TRACING: CPT

## 2020-01-01 PROCEDURE — 999N000157 HC STATISTIC RCP TIME EA 10 MIN

## 2020-01-01 PROCEDURE — 93010 ELECTROCARDIOGRAM REPORT: CPT | Performed by: INTERNAL MEDICINE

## 2020-01-01 PROCEDURE — 85014 HEMATOCRIT: CPT

## 2020-01-01 PROCEDURE — 250N000011 HC RX IP 250 OP 636: Performed by: INTERNAL MEDICINE

## 2020-01-01 PROCEDURE — 85520 HEPARIN ASSAY: CPT | Performed by: STUDENT IN AN ORGANIZED HEALTH CARE EDUCATION/TRAINING PROGRAM

## 2020-01-01 PROCEDURE — 200N000002 HC R&B ICU UMMC

## 2020-01-01 PROCEDURE — 84484 ASSAY OF TROPONIN QUANT: CPT | Performed by: STUDENT IN AN ORGANIZED HEALTH CARE EDUCATION/TRAINING PROGRAM

## 2020-01-01 PROCEDURE — 85730 THROMBOPLASTIN TIME PARTIAL: CPT | Performed by: STUDENT IN AN ORGANIZED HEALTH CARE EDUCATION/TRAINING PROGRAM

## 2020-01-01 PROCEDURE — 33949 ECMO/ECLS DAILY MGMT ARTERY: CPT

## 2020-01-01 PROCEDURE — 258N000003 HC RX IP 258 OP 636: Performed by: INTERNAL MEDICINE

## 2020-01-01 PROCEDURE — 93975 VASCULAR STUDY: CPT | Mod: 26 | Performed by: RADIOLOGY

## 2020-01-01 PROCEDURE — 258N000003 HC RX IP 258 OP 636: Performed by: STUDENT IN AN ORGANIZED HEALTH CARE EDUCATION/TRAINING PROGRAM

## 2020-01-01 PROCEDURE — 999N000185 HC STATISTIC TRANSPORT TIME EA 15 MIN

## 2020-01-01 PROCEDURE — 84295 ASSAY OF SERUM SODIUM: CPT

## 2020-01-01 PROCEDURE — 999N001017 HC STATISTIC GLUCOSE BY METER IP

## 2020-01-01 PROCEDURE — 33952 ECMO/ECLS INSJ PRPH CANNULA: CPT | Performed by: INTERNAL MEDICINE

## 2020-01-01 PROCEDURE — 33967 INSERT I-AORT PERCUT DEVICE: CPT | Performed by: INTERNAL MEDICINE

## 2020-01-01 PROCEDURE — 85384 FIBRINOGEN ACTIVITY: CPT | Performed by: STUDENT IN AN ORGANIZED HEALTH CARE EDUCATION/TRAINING PROGRAM

## 2020-01-01 PROCEDURE — 99207 PR APP CREDIT; MD BILLING SHARED VISIT: CPT | Performed by: NURSE PRACTITIONER

## 2020-01-01 PROCEDURE — 86901 BLOOD TYPING SEROLOGIC RH(D): CPT | Performed by: INTERNAL MEDICINE

## 2020-01-01 PROCEDURE — 93321 DOPPLER ECHO F-UP/LMTD STD: CPT | Mod: 26 | Performed by: INTERNAL MEDICINE

## 2020-01-01 PROCEDURE — 95720 EEG PHY/QHP EA INCR W/VEEG: CPT | Performed by: PSYCHIATRY & NEUROLOGY

## 2020-01-01 PROCEDURE — 83735 ASSAY OF MAGNESIUM: CPT | Performed by: STUDENT IN AN ORGANIZED HEALTH CARE EDUCATION/TRAINING PROGRAM

## 2020-01-01 PROCEDURE — 93925 LOWER EXTREMITY STUDY: CPT | Mod: 26 | Performed by: RADIOLOGY

## 2020-01-01 PROCEDURE — 93454 CORONARY ARTERY ANGIO S&I: CPT | Performed by: INTERNAL MEDICINE

## 2020-01-01 PROCEDURE — C9460 INJECTION, CANGRELOR: HCPCS | Performed by: INTERNAL MEDICINE

## 2020-01-01 PROCEDURE — 83615 LACTATE (LD) (LDH) ENZYME: CPT | Performed by: STUDENT IN AN ORGANIZED HEALTH CARE EDUCATION/TRAINING PROGRAM

## 2020-01-01 PROCEDURE — 86140 C-REACTIVE PROTEIN: CPT | Performed by: STUDENT IN AN ORGANIZED HEALTH CARE EDUCATION/TRAINING PROGRAM

## 2020-01-01 PROCEDURE — 95714 VEEG EA 12-26 HR UNMNTR: CPT

## 2020-01-01 PROCEDURE — 85610 PROTHROMBIN TIME: CPT | Performed by: STUDENT IN AN ORGANIZED HEALTH CARE EDUCATION/TRAINING PROGRAM

## 2020-01-01 PROCEDURE — 92978 ENDOLUMINL IVUS OCT C 1ST: CPT | Mod: LD | Performed by: INTERNAL MEDICINE

## 2020-01-01 PROCEDURE — 84145 PROCALCITONIN (PCT): CPT | Performed by: STUDENT IN AN ORGANIZED HEALTH CARE EDUCATION/TRAINING PROGRAM

## 2020-01-01 PROCEDURE — 5A02210 ASSISTANCE WITH CARDIAC OUTPUT USING BALLOON PUMP, CONTINUOUS: ICD-10-PCS | Performed by: INTERNAL MEDICINE

## 2020-01-01 PROCEDURE — 250N000011 HC RX IP 250 OP 636: Performed by: NURSE PRACTITIONER

## 2020-01-01 PROCEDURE — 83605 ASSAY OF LACTIC ACID: CPT | Performed by: INTERNAL MEDICINE

## 2020-01-01 PROCEDURE — 85027 COMPLETE CBC AUTOMATED: CPT | Performed by: STUDENT IN AN ORGANIZED HEALTH CARE EDUCATION/TRAINING PROGRAM

## 2020-01-01 PROCEDURE — 71045 X-RAY EXAM CHEST 1 VIEW: CPT | Mod: 26 | Performed by: RADIOLOGY

## 2020-01-01 PROCEDURE — 250N000011 HC RX IP 250 OP 636: Performed by: STUDENT IN AN ORGANIZED HEALTH CARE EDUCATION/TRAINING PROGRAM

## 2020-01-01 PROCEDURE — 250N000013 HC RX MED GY IP 250 OP 250 PS 637: Performed by: NURSE PRACTITIONER

## 2020-01-01 PROCEDURE — 71250 CT THORAX DX C-: CPT | Mod: 26 | Performed by: RADIOLOGY

## 2020-01-01 PROCEDURE — 999N000065 XR CHEST PORT 1 VW

## 2020-01-01 PROCEDURE — 85652 RBC SED RATE AUTOMATED: CPT | Performed by: STUDENT IN AN ORGANIZED HEALTH CARE EDUCATION/TRAINING PROGRAM

## 2020-01-01 PROCEDURE — 999N000015 HC STATISTIC ARTERIAL MONITORING DAILY

## 2020-01-01 PROCEDURE — P9059 PLASMA, FRZ BETWEEN 8-24HOUR: HCPCS | Performed by: STUDENT IN AN ORGANIZED HEALTH CARE EDUCATION/TRAINING PROGRAM

## 2020-01-01 PROCEDURE — 86900 BLOOD TYPING SEROLOGIC ABO: CPT | Performed by: INTERNAL MEDICINE

## 2020-01-01 PROCEDURE — 250N000009 HC RX 250

## 2020-01-01 PROCEDURE — 82803 BLOOD GASES ANY COMBINATION: CPT

## 2020-01-01 PROCEDURE — 82947 ASSAY GLUCOSE BLOOD QUANT: CPT | Performed by: STUDENT IN AN ORGANIZED HEALTH CARE EDUCATION/TRAINING PROGRAM

## 2020-01-01 PROCEDURE — 94003 VENT MGMT INPAT SUBQ DAY: CPT

## 2020-01-01 PROCEDURE — 82947 ASSAY GLUCOSE BLOOD QUANT: CPT

## 2020-01-01 PROCEDURE — 85025 COMPLETE CBC W/AUTO DIFF WBC: CPT | Performed by: STUDENT IN AN ORGANIZED HEALTH CARE EDUCATION/TRAINING PROGRAM

## 2020-01-01 PROCEDURE — 80347 BENZODIAZEPINES 13 OR MORE: CPT | Performed by: STUDENT IN AN ORGANIZED HEALTH CARE EDUCATION/TRAINING PROGRAM

## 2020-01-01 PROCEDURE — 87070 CULTURE OTHR SPECIMN AEROBIC: CPT

## 2020-01-01 PROCEDURE — 999N000075 HC STATISTIC IABP MONITORING

## 2020-01-01 PROCEDURE — 95714 VEEG EA 12-26 HR UNMNTR: CPT | Performed by: STUDENT IN AN ORGANIZED HEALTH CARE EDUCATION/TRAINING PROGRAM

## 2020-01-01 PROCEDURE — 87070 CULTURE OTHR SPECIMN AEROBIC: CPT | Performed by: NURSE PRACTITIONER

## 2020-01-01 PROCEDURE — 83051 HEMOGLOBIN PLASMA: CPT | Performed by: STUDENT IN AN ORGANIZED HEALTH CARE EDUCATION/TRAINING PROGRAM

## 2020-01-01 PROCEDURE — 86316 IMMUNOASSAY TUMOR OTHER: CPT | Performed by: STUDENT IN AN ORGANIZED HEALTH CARE EDUCATION/TRAINING PROGRAM

## 2020-01-01 PROCEDURE — 87070 CULTURE OTHR SPECIMN AEROBIC: CPT | Performed by: STUDENT IN AN ORGANIZED HEALTH CARE EDUCATION/TRAINING PROGRAM

## 2020-01-01 PROCEDURE — 99223 1ST HOSP IP/OBS HIGH 75: CPT | Mod: GC | Performed by: PSYCHIATRY & NEUROLOGY

## 2020-01-01 PROCEDURE — 85396 CLOTTING ASSAY WHOLE BLOOD: CPT | Performed by: STUDENT IN AN ORGANIZED HEALTH CARE EDUCATION/TRAINING PROGRAM

## 2020-01-01 PROCEDURE — 78606 BRAIN IMAGE W/FLOW 4 + VIEWS: CPT | Mod: 26 | Performed by: RADIOLOGY

## 2020-01-01 PROCEDURE — 3E043XZ INTRODUCTION OF VASOPRESSOR INTO CENTRAL VEIN, PERCUTANEOUS APPROACH: ICD-10-PCS | Performed by: INTERNAL MEDICINE

## 2020-01-01 PROCEDURE — 250N000013 HC RX MED GY IP 250 OP 250 PS 637

## 2020-01-01 PROCEDURE — 82810 BLOOD GASES O2 SAT ONLY: CPT

## 2020-01-01 PROCEDURE — P9016 RBC LEUKOCYTES REDUCED: HCPCS | Performed by: INTERNAL MEDICINE

## 2020-01-01 PROCEDURE — 82248 BILIRUBIN DIRECT: CPT | Performed by: STUDENT IN AN ORGANIZED HEALTH CARE EDUCATION/TRAINING PROGRAM

## 2020-01-01 PROCEDURE — 80053 COMPREHEN METABOLIC PANEL: CPT | Performed by: STUDENT IN AN ORGANIZED HEALTH CARE EDUCATION/TRAINING PROGRAM

## 2020-01-01 PROCEDURE — 99152 MOD SED SAME PHYS/QHP 5/>YRS: CPT | Mod: 59 | Performed by: INTERNAL MEDICINE

## 2020-01-01 PROCEDURE — 84484 ASSAY OF TROPONIN QUANT: CPT | Performed by: INTERNAL MEDICINE

## 2020-01-01 PROCEDURE — 94002 VENT MGMT INPAT INIT DAY: CPT

## 2020-01-01 PROCEDURE — 80307 DRUG TEST PRSMV CHEM ANLYZR: CPT | Performed by: STUDENT IN AN ORGANIZED HEALTH CARE EDUCATION/TRAINING PROGRAM

## 2020-01-01 PROCEDURE — 250N000009 HC RX 250: Performed by: STUDENT IN AN ORGANIZED HEALTH CARE EDUCATION/TRAINING PROGRAM

## 2020-01-01 PROCEDURE — 70450 CT HEAD/BRAIN W/O DYE: CPT | Mod: 26 | Performed by: RADIOLOGY

## 2020-01-01 PROCEDURE — 343N000001 HC RX 343: Performed by: INTERNAL MEDICINE

## 2020-01-01 PROCEDURE — 93306 TTE W/DOPPLER COMPLETE: CPT

## 2020-01-01 PROCEDURE — P9016 RBC LEUKOCYTES REDUCED: HCPCS | Performed by: STUDENT IN AN ORGANIZED HEALTH CARE EDUCATION/TRAINING PROGRAM

## 2020-01-01 PROCEDURE — 250N000011 HC RX IP 250 OP 636

## 2020-01-01 PROCEDURE — 33947 ECMO/ECLS INITIATION ARTERY: CPT

## 2020-01-01 PROCEDURE — 87040 BLOOD CULTURE FOR BACTERIA: CPT | Performed by: INTERNAL MEDICINE

## 2020-01-01 PROCEDURE — 82330 ASSAY OF CALCIUM: CPT | Performed by: INTERNAL MEDICINE

## 2020-01-01 PROCEDURE — 71045 X-RAY EXAM CHEST 1 VIEW: CPT | Mod: 26 | Performed by: STUDENT IN AN ORGANIZED HEALTH CARE EDUCATION/TRAINING PROGRAM

## 2020-01-01 PROCEDURE — 86923 COMPATIBILITY TEST ELECTRIC: CPT | Performed by: STUDENT IN AN ORGANIZED HEALTH CARE EDUCATION/TRAINING PROGRAM

## 2020-01-01 PROCEDURE — 272N000001 HC OR GENERAL SUPPLY STERILE: Performed by: INTERNAL MEDICINE

## 2020-01-01 PROCEDURE — 272N000237 HC CARDIOHELP CIRCUIT

## 2020-01-01 PROCEDURE — 31645 BRNCHSC W/THER ASPIR 1ST: CPT | Performed by: INTERNAL MEDICINE

## 2020-01-01 PROCEDURE — 86923 COMPATIBILITY TEST ELECTRIC: CPT | Performed by: INTERNAL MEDICINE

## 2020-01-01 PROCEDURE — 85520 HEPARIN ASSAY: CPT | Performed by: INTERNAL MEDICINE

## 2020-01-01 PROCEDURE — 999N001063 HC STATISTIC THAWING COMPONENT: Performed by: STUDENT IN AN ORGANIZED HEALTH CARE EDUCATION/TRAINING PROGRAM

## 2020-01-01 PROCEDURE — C1887 CATHETER, GUIDING: HCPCS | Performed by: INTERNAL MEDICINE

## 2020-01-01 PROCEDURE — 84132 ASSAY OF SERUM POTASSIUM: CPT

## 2020-01-01 PROCEDURE — P9041 ALBUMIN (HUMAN),5%, 50ML: HCPCS | Performed by: INTERNAL MEDICINE

## 2020-01-01 PROCEDURE — 82330 ASSAY OF CALCIUM: CPT

## 2020-01-01 PROCEDURE — 93925 LOWER EXTREMITY STUDY: CPT

## 2020-01-01 PROCEDURE — 82533 TOTAL CORTISOL: CPT | Performed by: STUDENT IN AN ORGANIZED HEALTH CARE EDUCATION/TRAINING PROGRAM

## 2020-01-01 PROCEDURE — U0003 INFECTIOUS AGENT DETECTION BY NUCLEIC ACID (DNA OR RNA); SEVERE ACUTE RESPIRATORY SYNDROME CORONAVIRUS 2 (SARS-COV-2) (CORONAVIRUS DISEASE [COVID-19]), AMPLIFIED PROBE TECHNIQUE, MAKING USE OF HIGH THROUGHPUT TECHNOLOGIES AS DESCRIBED BY CMS-2020-01-R: HCPCS

## 2020-01-01 PROCEDURE — 84702 CHORIONIC GONADOTROPIN TEST: CPT | Performed by: NURSE PRACTITIONER

## 2020-01-01 PROCEDURE — 95714 VEEG EA 12-26 HR UNMNTR: CPT | Performed by: PSYCHIATRY & NEUROLOGY

## 2020-01-01 PROCEDURE — P9041 ALBUMIN (HUMAN),5%, 50ML: HCPCS | Performed by: STUDENT IN AN ORGANIZED HEALTH CARE EDUCATION/TRAINING PROGRAM

## 2020-01-01 PROCEDURE — 82805 BLOOD GASES W/O2 SATURATION: CPT | Performed by: STUDENT IN AN ORGANIZED HEALTH CARE EDUCATION/TRAINING PROGRAM

## 2020-01-01 PROCEDURE — 99238 HOSP IP/OBS DSCHRG MGMT 30/<: CPT | Mod: 25 | Performed by: INTERNAL MEDICINE

## 2020-01-01 PROCEDURE — 36415 COLL VENOUS BLD VENIPUNCTURE: CPT | Performed by: INTERNAL MEDICINE

## 2020-01-01 PROCEDURE — 250N000009 HC RX 250: Performed by: NURSE PRACTITIONER

## 2020-01-01 PROCEDURE — 82150 ASSAY OF AMYLASE: CPT | Performed by: STUDENT IN AN ORGANIZED HEALTH CARE EDUCATION/TRAINING PROGRAM

## 2020-01-01 PROCEDURE — 87086 URINE CULTURE/COLONY COUNT: CPT | Performed by: INTERNAL MEDICINE

## 2020-01-01 PROCEDURE — 85018 HEMOGLOBIN: CPT

## 2020-01-01 PROCEDURE — 99207 PR APP CREDIT; MD BILLING SHARED VISIT: CPT | Performed by: INTERNAL MEDICINE

## 2020-01-01 PROCEDURE — G0463 HOSPITAL OUTPT CLINIC VISIT: HCPCS

## 2020-01-01 PROCEDURE — 93325 DOPPLER ECHO COLOR FLOW MAPG: CPT | Mod: 26 | Performed by: INTERNAL MEDICINE

## 2020-01-01 PROCEDURE — 250N000013 HC RX MED GY IP 250 OP 250 PS 637: Performed by: STUDENT IN AN ORGANIZED HEALTH CARE EDUCATION/TRAINING PROGRAM

## 2020-01-01 PROCEDURE — 82805 BLOOD GASES W/O2 SATURATION: CPT | Performed by: INTERNAL MEDICINE

## 2020-01-01 PROCEDURE — 272N000555 HC SENSOR NIRS OXIMETER, ADULT

## 2020-01-01 PROCEDURE — 0BCC8ZZ EXTIRPATION OF MATTER FROM RIGHT UPPER LUNG LOBE, VIA NATURAL OR ARTIFICIAL OPENING ENDOSCOPIC: ICD-10-PCS | Performed by: INTERNAL MEDICINE

## 2020-01-01 PROCEDURE — 82947 ASSAY GLUCOSE BLOOD QUANT: CPT | Performed by: INTERNAL MEDICINE

## 2020-01-01 PROCEDURE — 80320 DRUG SCREEN QUANTALCOHOLS: CPT | Performed by: STUDENT IN AN ORGANIZED HEALTH CARE EDUCATION/TRAINING PROGRAM

## 2020-01-01 PROCEDURE — 87186 SC STD MICRODIL/AGAR DIL: CPT

## 2020-01-01 PROCEDURE — 93308 TTE F-UP OR LMTD: CPT

## 2020-01-01 PROCEDURE — 027034Z DILATION OF CORONARY ARTERY, ONE ARTERY WITH DRUG-ELUTING INTRALUMINAL DEVICE, PERCUTANEOUS APPROACH: ICD-10-PCS | Performed by: INTERNAL MEDICINE

## 2020-01-01 PROCEDURE — 85004 AUTOMATED DIFF WBC COUNT: CPT | Performed by: STUDENT IN AN ORGANIZED HEALTH CARE EDUCATION/TRAINING PROGRAM

## 2020-01-01 PROCEDURE — 81001 URINALYSIS AUTO W/SCOPE: CPT

## 2020-01-01 PROCEDURE — 99233 SBSQ HOSP IP/OBS HIGH 50: CPT | Performed by: NURSE PRACTITIONER

## 2020-01-01 PROCEDURE — 99153 MOD SED SAME PHYS/QHP EA: CPT | Performed by: INTERNAL MEDICINE

## 2020-01-01 PROCEDURE — 6A4Z0ZZ HYPOTHERMIA, SINGLE: ICD-10-PCS | Performed by: INTERNAL MEDICINE

## 2020-01-01 PROCEDURE — 99152 MOD SED SAME PHYS/QHP 5/>YRS: CPT | Performed by: INTERNAL MEDICINE

## 2020-01-01 PROCEDURE — 83605 ASSAY OF LACTIC ACID: CPT | Performed by: STUDENT IN AN ORGANIZED HEALTH CARE EDUCATION/TRAINING PROGRAM

## 2020-01-01 PROCEDURE — U0003 INFECTIOUS AGENT DETECTION BY NUCLEIC ACID (DNA OR RNA); SEVERE ACUTE RESPIRATORY SYNDROME CORONAVIRUS 2 (SARS-COV-2) (CORONAVIRUS DISEASE [COVID-19]), AMPLIFIED PROBE TECHNIQUE, MAKING USE OF HIGH THROUGHPUT TECHNOLOGIES AS DESCRIBED BY CMS-2020-01-R: HCPCS | Performed by: STUDENT IN AN ORGANIZED HEALTH CARE EDUCATION/TRAINING PROGRAM

## 2020-01-01 PROCEDURE — 86900 BLOOD TYPING SEROLOGIC ABO: CPT | Performed by: STUDENT IN AN ORGANIZED HEALTH CARE EDUCATION/TRAINING PROGRAM

## 2020-01-01 PROCEDURE — 31622 DX BRONCHOSCOPE/WASH: CPT

## 2020-01-01 PROCEDURE — 99232 SBSQ HOSP IP/OBS MODERATE 35: CPT | Mod: GC | Performed by: PSYCHIATRY & NEUROLOGY

## 2020-01-01 PROCEDURE — 84295 ASSAY OF SERUM SODIUM: CPT | Performed by: INTERNAL MEDICINE

## 2020-01-01 PROCEDURE — 85027 COMPLETE CBC AUTOMATED: CPT | Performed by: INTERNAL MEDICINE

## 2020-01-01 PROCEDURE — A9521 TC99M EXAMETAZIME: HCPCS | Performed by: INTERNAL MEDICINE

## 2020-01-01 PROCEDURE — 74176 CT ABD & PELVIS W/O CONTRAST: CPT | Mod: 26 | Performed by: RADIOLOGY

## 2020-01-01 PROCEDURE — 93880 EXTRACRANIAL BILAT STUDY: CPT | Mod: 26 | Performed by: RADIOLOGY

## 2020-01-01 PROCEDURE — 87077 CULTURE AEROBIC IDENTIFY: CPT | Performed by: NURSE PRACTITIONER

## 2020-01-01 PROCEDURE — 87077 CULTURE AEROBIC IDENTIFY: CPT

## 2020-01-01 PROCEDURE — 33949 ECMO/ECLS DAILY MGMT ARTERY: CPT | Mod: GC | Performed by: INTERNAL MEDICINE

## 2020-01-01 PROCEDURE — 86850 RBC ANTIBODY SCREEN: CPT | Performed by: STUDENT IN AN ORGANIZED HEALTH CARE EDUCATION/TRAINING PROGRAM

## 2020-01-01 PROCEDURE — 95711 VEEG 2-12 HR UNMONITORED: CPT

## 2020-01-01 PROCEDURE — 83605 ASSAY OF LACTIC ACID: CPT

## 2020-01-01 PROCEDURE — 93880 EXTRACRANIAL BILAT STUDY: CPT

## 2020-01-01 PROCEDURE — 33947 ECMO/ECLS INITIATION ARTERY: CPT | Mod: GC | Performed by: INTERNAL MEDICINE

## 2020-01-01 PROCEDURE — 87186 SC STD MICRODIL/AGAR DIL: CPT | Performed by: NURSE PRACTITIONER

## 2020-01-01 PROCEDURE — 80061 LIPID PANEL: CPT | Performed by: STUDENT IN AN ORGANIZED HEALTH CARE EDUCATION/TRAINING PROGRAM

## 2020-01-01 PROCEDURE — 87205 SMEAR GRAM STAIN: CPT | Performed by: STUDENT IN AN ORGANIZED HEALTH CARE EDUCATION/TRAINING PROGRAM

## 2020-01-01 PROCEDURE — 85384 FIBRINOGEN ACTIVITY: CPT | Performed by: INTERNAL MEDICINE

## 2020-01-01 PROCEDURE — 80053 COMPREHEN METABOLIC PANEL: CPT | Performed by: INTERNAL MEDICINE

## 2020-01-01 PROCEDURE — 83690 ASSAY OF LIPASE: CPT | Performed by: STUDENT IN AN ORGANIZED HEALTH CARE EDUCATION/TRAINING PROGRAM

## 2020-01-01 PROCEDURE — 99231 SBSQ HOSP IP/OBS SF/LOW 25: CPT | Mod: GC | Performed by: INTERNAL MEDICINE

## 2020-01-01 PROCEDURE — B2111ZZ FLUOROSCOPY OF MULTIPLE CORONARY ARTERIES USING LOW OSMOLAR CONTRAST: ICD-10-PCS | Performed by: INTERNAL MEDICINE

## 2020-01-01 PROCEDURE — 81001 URINALYSIS AUTO W/SCOPE: CPT | Performed by: STUDENT IN AN ORGANIZED HEALTH CARE EDUCATION/TRAINING PROGRAM

## 2020-01-01 PROCEDURE — 84295 ASSAY OF SERUM SODIUM: CPT | Performed by: STUDENT IN AN ORGANIZED HEALTH CARE EDUCATION/TRAINING PROGRAM

## 2020-01-01 PROCEDURE — 272N000083 HC NUTRITION PRODUCT SEMIELEM INTERMED LITER

## 2020-01-01 PROCEDURE — 83735 ASSAY OF MAGNESIUM: CPT | Performed by: INTERNAL MEDICINE

## 2020-01-01 PROCEDURE — 85730 THROMBOPLASTIN TIME PARTIAL: CPT | Performed by: INTERNAL MEDICINE

## 2020-01-01 PROCEDURE — C1725 CATH, TRANSLUMIN NON-LASER: HCPCS | Performed by: INTERNAL MEDICINE

## 2020-01-01 PROCEDURE — C9606 PERC D-E COR REVASC W AMI S: HCPCS | Mod: LD | Performed by: INTERNAL MEDICINE

## 2020-01-01 PROCEDURE — 33967 INSERT I-AORT PERCUT DEVICE: CPT | Mod: GC | Performed by: INTERNAL MEDICINE

## 2020-01-01 PROCEDURE — 70450 CT HEAD/BRAIN W/O DYE: CPT | Mod: 26 | Performed by: STUDENT IN AN ORGANIZED HEALTH CARE EDUCATION/TRAINING PROGRAM

## 2020-01-01 PROCEDURE — C1753 CATH, INTRAVAS ULTRASOUND: HCPCS | Performed by: INTERNAL MEDICINE

## 2020-01-01 PROCEDURE — 95720 EEG PHY/QHP EA INCR W/VEEG: CPT | Mod: GC | Performed by: PSYCHIATRY & NEUROLOGY

## 2020-01-01 PROCEDURE — C1874 STENT, COATED/COV W/DEL SYS: HCPCS | Performed by: INTERNAL MEDICINE

## 2020-01-01 PROCEDURE — 999N000077 HC STATISTIC INSERT IABP

## 2020-01-01 PROCEDURE — 85004 AUTOMATED DIFF WBC COUNT: CPT

## 2020-01-01 PROCEDURE — 99221 1ST HOSP IP/OBS SF/LOW 40: CPT | Performed by: PHYSICIAN ASSISTANT

## 2020-01-01 PROCEDURE — 86901 BLOOD TYPING SEROLOGIC RH(D): CPT | Performed by: STUDENT IN AN ORGANIZED HEALTH CARE EDUCATION/TRAINING PROGRAM

## 2020-01-01 PROCEDURE — 86316 IMMUNOASSAY TUMOR OTHER: CPT | Performed by: INTERNAL MEDICINE

## 2020-01-01 PROCEDURE — 258N000003 HC RX IP 258 OP 636

## 2020-01-01 PROCEDURE — P9012 CRYOPRECIPITATE EACH UNIT: HCPCS | Performed by: STUDENT IN AN ORGANIZED HEALTH CARE EDUCATION/TRAINING PROGRAM

## 2020-01-01 PROCEDURE — 92978 ENDOLUMINL IVUS OCT C 1ST: CPT | Mod: 26 | Performed by: INTERNAL MEDICINE

## 2020-01-01 PROCEDURE — 5A1955Z RESPIRATORY VENTILATION, GREATER THAN 96 CONSECUTIVE HOURS: ICD-10-PCS | Performed by: INTERNAL MEDICINE

## 2020-01-01 PROCEDURE — 85610 PROTHROMBIN TIME: CPT | Performed by: INTERNAL MEDICINE

## 2020-01-01 PROCEDURE — 93308 TTE F-UP OR LMTD: CPT | Mod: 26 | Performed by: INTERNAL MEDICINE

## 2020-01-01 PROCEDURE — 272N000220 HC BRONCHOSCOPE, DISPOSABLE

## 2020-01-01 PROCEDURE — 250N000013 HC RX MED GY IP 250 OP 250 PS 637: Performed by: INTERNAL MEDICINE

## 2020-01-01 PROCEDURE — 36556 INSERT NON-TUNNEL CV CATH: CPT | Performed by: INTERNAL MEDICINE

## 2020-01-01 PROCEDURE — 83036 HEMOGLOBIN GLYCOSYLATED A1C: CPT | Performed by: STUDENT IN AN ORGANIZED HEALTH CARE EDUCATION/TRAINING PROGRAM

## 2020-01-01 PROCEDURE — 93975 VASCULAR STUDY: CPT

## 2020-01-01 PROCEDURE — 78606 BRAIN IMAGE W/FLOW 4 + VIEWS: CPT

## 2020-01-01 PROCEDURE — 33952 ECMO/ECLS INSJ PRPH CANNULA: CPT | Mod: GC | Performed by: INTERNAL MEDICINE

## 2020-01-01 PROCEDURE — 99232 SBSQ HOSP IP/OBS MODERATE 35: CPT | Mod: 25 | Performed by: PSYCHIATRY & NEUROLOGY

## 2020-01-01 PROCEDURE — 95711 VEEG 2-12 HR UNMONITORED: CPT | Performed by: STUDENT IN AN ORGANIZED HEALTH CARE EDUCATION/TRAINING PROGRAM

## 2020-01-01 PROCEDURE — 33949 ECMO/ECLS DAILY MGMT ARTERY: CPT | Performed by: INTERNAL MEDICINE

## 2020-01-01 PROCEDURE — 93454 CORONARY ARTERY ANGIO S&I: CPT | Mod: 26 | Performed by: INTERNAL MEDICINE

## 2020-01-01 PROCEDURE — B240ZZ3 ULTRASONOGRAPHY OF SINGLE CORONARY ARTERY, INTRAVASCULAR: ICD-10-PCS | Performed by: INTERNAL MEDICINE

## 2020-01-01 PROCEDURE — C1894 INTRO/SHEATH, NON-LASER: HCPCS | Performed by: INTERNAL MEDICINE

## 2020-01-01 PROCEDURE — 272N000057 HC CATH BALLOON IABP

## 2020-01-01 PROCEDURE — C1751 CATH, INF, PER/CENT/MIDLINE: HCPCS | Performed by: INTERNAL MEDICINE

## 2020-01-01 PROCEDURE — 92941 PRQ TRLML REVSC TOT OCCL AMI: CPT | Mod: LD | Performed by: INTERNAL MEDICINE

## 2020-01-01 PROCEDURE — 80202 ASSAY OF VANCOMYCIN: CPT | Performed by: INTERNAL MEDICINE

## 2020-01-01 PROCEDURE — 258N000003 HC RX IP 258 OP 636: Performed by: PHYSICIAN ASSISTANT

## 2020-01-01 PROCEDURE — 5A1522G EXTRACORPOREAL OXYGENATION, MEMBRANE, PERIPHERAL VENO-ARTERIAL: ICD-10-PCS | Performed by: INTERNAL MEDICINE

## 2020-01-01 PROCEDURE — 74176 CT ABD & PELVIS W/O CONTRAST: CPT

## 2020-01-01 PROCEDURE — C1769 GUIDE WIRE: HCPCS | Performed by: INTERNAL MEDICINE

## 2020-01-01 PROCEDURE — 85018 HEMOGLOBIN: CPT | Performed by: STUDENT IN AN ORGANIZED HEALTH CARE EDUCATION/TRAINING PROGRAM

## 2020-01-01 DEVICE — STENT SYNERGY DRUG ELUTING 3.50X38MM  H7493926038350: Type: IMPLANTABLE DEVICE | Status: FUNCTIONAL

## 2020-01-01 RX ORDER — POTASSIUM CHLORIDE 750 MG/1
20-40 TABLET, EXTENDED RELEASE ORAL
Status: DISCONTINUED | OUTPATIENT
Start: 2020-01-01 | End: 2020-01-01

## 2020-01-01 RX ORDER — NOREPINEPHRINE BITARTRATE 0.06 MG/ML
0.03-0.4 INJECTION, SOLUTION INTRAVENOUS CONTINUOUS
Status: DISCONTINUED | OUTPATIENT
Start: 2020-01-01 | End: 2020-11-17 | Stop reason: HOSPADM

## 2020-01-01 RX ORDER — POTASSIUM CHLORIDE 29.8 MG/ML
20 INJECTION INTRAVENOUS
Status: DISCONTINUED | OUTPATIENT
Start: 2020-01-01 | End: 2020-01-01

## 2020-01-01 RX ORDER — NOREPINEPHRINE BITARTRATE 0.06 MG/ML
INJECTION, SOLUTION INTRAVENOUS CONTINUOUS PRN
Status: COMPLETED | OUTPATIENT
Start: 2020-01-01 | End: 2020-01-01

## 2020-01-01 RX ORDER — LIDOCAINE HYDROCHLORIDE ANHYDROUS AND DEXTROSE MONOHYDRATE .8; 5 G/100ML; G/100ML
1-4 INJECTION, SOLUTION INTRAVENOUS CONTINUOUS
Status: DISCONTINUED | OUTPATIENT
Start: 2020-01-01 | End: 2020-01-01

## 2020-01-01 RX ORDER — HEPARIN SODIUM 10000 [USP'U]/100ML
10-50 INJECTION, SOLUTION INTRAVENOUS CONTINUOUS
Status: DISCONTINUED | OUTPATIENT
Start: 2020-01-01 | End: 2020-11-17 | Stop reason: HOSPADM

## 2020-01-01 RX ORDER — POTASSIUM CHLORIDE 750 MG/1
20-40 TABLET, EXTENDED RELEASE ORAL
Status: DISCONTINUED | OUTPATIENT
Start: 2020-01-01 | End: 2020-11-17 | Stop reason: HOSPADM

## 2020-01-01 RX ORDER — DEXTROSE MONOHYDRATE 100 MG/ML
INJECTION, SOLUTION INTRAVENOUS CONTINUOUS PRN
Status: DISCONTINUED | OUTPATIENT
Start: 2020-01-01 | End: 2020-11-17 | Stop reason: HOSPADM

## 2020-01-01 RX ORDER — ALBUMIN, HUMAN INJ 5% 5 %
50 SOLUTION INTRAVENOUS
Status: DISCONTINUED | OUTPATIENT
Start: 2020-01-01 | End: 2020-11-17 | Stop reason: HOSPADM

## 2020-01-01 RX ORDER — NITROGLYCERIN 5 MG/ML
VIAL (ML) INTRAVENOUS
Status: DISCONTINUED | OUTPATIENT
Start: 2020-01-01 | End: 2020-01-01 | Stop reason: HOSPADM

## 2020-01-01 RX ORDER — NOREPINEPHRINE BITARTRATE 0.06 MG/ML
0.03-0.4 INJECTION, SOLUTION INTRAVENOUS CONTINUOUS
Status: DISCONTINUED | OUTPATIENT
Start: 2020-01-01 | End: 2020-01-01

## 2020-01-01 RX ORDER — HEPARIN SODIUM (PORCINE) LOCK FLUSH IV SOLN 100 UNIT/ML 100 UNIT/ML
5-10 SOLUTION INTRAVENOUS EVERY 30 MIN PRN
Status: DISCONTINUED | OUTPATIENT
Start: 2020-01-01 | End: 2020-01-01

## 2020-01-01 RX ORDER — TIROFIBAN HYDROCHLORIDE 50 UG/ML
0.15 INJECTION INTRAVENOUS CONTINUOUS PRN
Status: DISCONTINUED | OUTPATIENT
Start: 2020-01-01 | End: 2020-01-01

## 2020-01-01 RX ORDER — MANNITOL 20 G/100ML
INJECTION, SOLUTION INTRAVENOUS
Status: DISCONTINUED
Start: 2020-01-01 | End: 2020-01-01 | Stop reason: HOSPADM

## 2020-01-01 RX ORDER — PIPERACILLIN SODIUM, TAZOBACTAM SODIUM 3; .375 G/15ML; G/15ML
3.38 INJECTION, POWDER, LYOPHILIZED, FOR SOLUTION INTRAVENOUS EVERY 6 HOURS
Status: DISCONTINUED | OUTPATIENT
Start: 2020-01-01 | End: 2020-11-04

## 2020-01-01 RX ORDER — MIDAZOLAM (PF) 1 MG/ML IN 0.9 % SODIUM CHLORIDE INTRAVENOUS SOLUTION
1-8 CONTINUOUS
Status: DISCONTINUED | OUTPATIENT
Start: 2020-01-01 | End: 2020-01-01

## 2020-01-01 RX ORDER — NALOXONE HYDROCHLORIDE 0.4 MG/ML
.1-.4 INJECTION, SOLUTION INTRAMUSCULAR; INTRAVENOUS; SUBCUTANEOUS
Status: DISCONTINUED | OUTPATIENT
Start: 2020-01-01 | End: 2020-11-17 | Stop reason: HOSPADM

## 2020-01-01 RX ORDER — ACETAMINOPHEN AND CODEINE PHOSPHATE 120; 12 MG/5ML; MG/5ML
0.35 SOLUTION ORAL DAILY
Status: DISCONTINUED | OUTPATIENT
Start: 2020-01-01 | End: 2020-01-01

## 2020-01-01 RX ORDER — ARGATROBAN 1 MG/ML
150 INJECTION, SOLUTION INTRAVENOUS
Status: DISCONTINUED | OUTPATIENT
Start: 2020-01-01 | End: 2020-01-01

## 2020-01-01 RX ORDER — MAGNESIUM SULFATE HEPTAHYDRATE 40 MG/ML
2 INJECTION, SOLUTION INTRAVENOUS DAILY PRN
Status: DISCONTINUED | OUTPATIENT
Start: 2020-01-01 | End: 2020-11-17 | Stop reason: HOSPADM

## 2020-01-01 RX ORDER — HEPARIN SODIUM 10000 [USP'U]/100ML
100-1000 INJECTION, SOLUTION INTRAVENOUS CONTINUOUS PRN
Status: DISCONTINUED | OUTPATIENT
Start: 2020-01-01 | End: 2020-01-01

## 2020-01-01 RX ORDER — SODIUM CHLORIDE 3 G/100ML
100 INJECTION, SOLUTION INTRAVENOUS ONCE
Status: COMPLETED | OUTPATIENT
Start: 2020-01-01 | End: 2020-01-01

## 2020-01-01 RX ORDER — POTASSIUM CL/LIDO/0.9 % NACL 10MEQ/0.1L
10 INTRAVENOUS SOLUTION, PIGGYBACK (ML) INTRAVENOUS
Status: DISCONTINUED | OUTPATIENT
Start: 2020-01-01 | End: 2020-01-01

## 2020-01-01 RX ORDER — POTASSIUM CHLORIDE 1.5 G/1.58G
20-40 POWDER, FOR SOLUTION ORAL
Status: DISCONTINUED | OUTPATIENT
Start: 2020-01-01 | End: 2020-11-17 | Stop reason: HOSPADM

## 2020-01-01 RX ORDER — DEXTROSE MONOHYDRATE 25 G/50ML
25-50 INJECTION, SOLUTION INTRAVENOUS
Status: DISCONTINUED | OUTPATIENT
Start: 2020-01-01 | End: 2020-11-17 | Stop reason: HOSPADM

## 2020-01-01 RX ORDER — HEPARIN SODIUM 10000 [USP'U]/100ML
10-50 INJECTION, SOLUTION INTRAVENOUS CONTINUOUS
Status: DISCONTINUED | OUTPATIENT
Start: 2020-01-01 | End: 2020-01-01

## 2020-01-01 RX ORDER — POLYETHYLENE GLYCOL 3350 17 G/17G
17 POWDER, FOR SOLUTION ORAL DAILY
Status: DISCONTINUED | OUTPATIENT
Start: 2020-01-01 | End: 2020-01-01

## 2020-01-01 RX ORDER — MANNITOL 20 G/100ML
100 INJECTION, SOLUTION INTRAVENOUS ONCE
Status: COMPLETED | OUTPATIENT
Start: 2020-01-01 | End: 2020-01-01

## 2020-01-01 RX ORDER — EPTIFIBATIDE 2 MG/ML
180 INJECTION, SOLUTION INTRAVENOUS EVERY 10 MIN PRN
Status: DISCONTINUED | OUTPATIENT
Start: 2020-01-01 | End: 2020-01-01

## 2020-01-01 RX ORDER — HYDRALAZINE HYDROCHLORIDE 25 MG/1
25 TABLET, FILM COATED ORAL EVERY 8 HOURS SCHEDULED
Status: DISCONTINUED | OUTPATIENT
Start: 2020-01-01 | End: 2020-01-01 | Stop reason: ALTCHOICE

## 2020-01-01 RX ORDER — HYDRALAZINE HYDROCHLORIDE 20 MG/ML
10 INJECTION INTRAMUSCULAR; INTRAVENOUS ONCE
Status: COMPLETED | OUTPATIENT
Start: 2020-01-01 | End: 2020-01-01

## 2020-01-01 RX ORDER — CALCIUM CHLORIDE 100 MG/ML
INJECTION INTRAVENOUS; INTRAVENTRICULAR
Status: COMPLETED
Start: 2020-01-01 | End: 2020-01-01

## 2020-01-01 RX ORDER — PIPERACILLIN SODIUM, TAZOBACTAM SODIUM 3; .375 G/15ML; G/15ML
3.38 INJECTION, POWDER, LYOPHILIZED, FOR SOLUTION INTRAVENOUS EVERY 6 HOURS
Status: COMPLETED | OUTPATIENT
Start: 2020-01-01 | End: 2020-01-01

## 2020-01-01 RX ORDER — AMOXICILLIN 250 MG
1 CAPSULE ORAL 2 TIMES DAILY
Status: DISCONTINUED | OUTPATIENT
Start: 2020-01-01 | End: 2020-01-01

## 2020-01-01 RX ORDER — DOPAMINE HYDROCHLORIDE 160 MG/100ML
2-20 INJECTION, SOLUTION INTRAVENOUS CONTINUOUS PRN
Status: DISCONTINUED | OUTPATIENT
Start: 2020-01-01 | End: 2020-01-01

## 2020-01-01 RX ORDER — POTASSIUM CHLORIDE 7.45 MG/ML
10 INJECTION INTRAVENOUS
Status: DISCONTINUED | OUTPATIENT
Start: 2020-01-01 | End: 2020-01-01

## 2020-01-01 RX ORDER — POTASSIUM CL/LIDO/0.9 % NACL 10MEQ/0.1L
10 INTRAVENOUS SOLUTION, PIGGYBACK (ML) INTRAVENOUS
Status: DISCONTINUED | OUTPATIENT
Start: 2020-01-01 | End: 2020-11-17 | Stop reason: HOSPADM

## 2020-01-01 RX ORDER — POTASSIUM CHLORIDE 29.8 MG/ML
20 INJECTION INTRAVENOUS
Status: DISCONTINUED | OUTPATIENT
Start: 2020-01-01 | End: 2020-11-17 | Stop reason: HOSPADM

## 2020-01-01 RX ORDER — MAGNESIUM SULFATE HEPTAHYDRATE 40 MG/ML
4 INJECTION, SOLUTION INTRAVENOUS EVERY 4 HOURS PRN
Status: DISCONTINUED | OUTPATIENT
Start: 2020-01-01 | End: 2020-11-17 | Stop reason: HOSPADM

## 2020-01-01 RX ORDER — EPINEPHRINE IN SOD CHLOR,ISO 1 MG/10 ML
SYRINGE (ML) INTRAVENOUS
Status: DISCONTINUED | OUTPATIENT
Start: 2020-01-01 | End: 2020-01-01 | Stop reason: HOSPADM

## 2020-01-01 RX ORDER — NOREPINEPHRINE BITARTRATE 0.06 MG/ML
.03-.4 INJECTION, SOLUTION INTRAVENOUS CONTINUOUS
Status: DISCONTINUED | OUTPATIENT
Start: 2020-01-01 | End: 2020-01-01

## 2020-01-01 RX ORDER — HEPARIN SODIUM 200 [USP'U]/100ML
3 INJECTION, SOLUTION INTRAVENOUS CONTINUOUS
Status: DISCONTINUED | OUTPATIENT
Start: 2020-01-01 | End: 2020-11-17 | Stop reason: HOSPADM

## 2020-01-01 RX ORDER — FUROSEMIDE 10 MG/ML
60 INJECTION INTRAMUSCULAR; INTRAVENOUS ONCE
Status: COMPLETED | OUTPATIENT
Start: 2020-01-01 | End: 2020-01-01

## 2020-01-01 RX ORDER — NITROGLYCERIN 20 MG/100ML
10-200 INJECTION INTRAVENOUS CONTINUOUS PRN
Status: DISCONTINUED | OUTPATIENT
Start: 2020-01-01 | End: 2020-01-01

## 2020-01-01 RX ORDER — HEPARIN SODIUM 1000 [USP'U]/ML
INJECTION, SOLUTION INTRAVENOUS; SUBCUTANEOUS
Status: DISCONTINUED | OUTPATIENT
Start: 2020-01-01 | End: 2020-01-01 | Stop reason: HOSPADM

## 2020-01-01 RX ORDER — ALBUMIN, HUMAN INJ 5% 5 %
500 SOLUTION INTRAVENOUS EVERY 10 MIN PRN
Status: DISCONTINUED | OUTPATIENT
Start: 2020-01-01 | End: 2020-11-17 | Stop reason: HOSPADM

## 2020-01-01 RX ORDER — ARGATROBAN 1 MG/ML
350 INJECTION, SOLUTION INTRAVENOUS
Status: DISCONTINUED | OUTPATIENT
Start: 2020-01-01 | End: 2020-01-01

## 2020-01-01 RX ORDER — NICOTINE POLACRILEX 4 MG
15-30 LOZENGE BUCCAL
Status: DISCONTINUED | OUTPATIENT
Start: 2020-01-01 | End: 2020-11-17 | Stop reason: HOSPADM

## 2020-01-01 RX ORDER — FUROSEMIDE 10 MG/ML
60 INJECTION INTRAMUSCULAR; INTRAVENOUS ONCE
Status: DISCONTINUED | OUTPATIENT
Start: 2020-01-01 | End: 2020-01-01

## 2020-01-01 RX ORDER — CARBOXYMETHYLCELLULOSE SODIUM 5 MG/ML
2 SOLUTION/ DROPS OPHTHALMIC EVERY 4 HOURS
Status: DISCONTINUED | OUTPATIENT
Start: 2020-01-01 | End: 2020-11-17 | Stop reason: HOSPADM

## 2020-01-01 RX ORDER — MIDAZOLAM (PF) 1 MG/ML IN 0.9 % SODIUM CHLORIDE INTRAVENOUS SOLUTION
CONTINUOUS PRN
Status: COMPLETED | OUTPATIENT
Start: 2020-01-01 | End: 2020-01-01

## 2020-01-01 RX ORDER — FENTANYL CITRATE-0.9 % NACL/PF 10 MCG/ML
PLASTIC BAG, INJECTION (ML) INTRAVENOUS
Status: DISCONTINUED | OUTPATIENT
Start: 2020-01-01 | End: 2020-01-01 | Stop reason: HOSPADM

## 2020-01-01 RX ORDER — IOPAMIDOL 755 MG/ML
INJECTION, SOLUTION INTRAVASCULAR
Status: DISCONTINUED | OUTPATIENT
Start: 2020-01-01 | End: 2020-01-01 | Stop reason: HOSPADM

## 2020-01-01 RX ORDER — DOBUTAMINE HYDROCHLORIDE 200 MG/100ML
2-20 INJECTION INTRAVENOUS CONTINUOUS PRN
Status: DISCONTINUED | OUTPATIENT
Start: 2020-01-01 | End: 2020-01-01

## 2020-01-01 RX ORDER — EPTIFIBATIDE 2 MG/ML
2 INJECTION, SOLUTION INTRAVENOUS CONTINUOUS PRN
Status: DISCONTINUED | OUTPATIENT
Start: 2020-01-01 | End: 2020-01-01

## 2020-01-01 RX ORDER — NOREPINEPHRINE BITARTRATE 0.06 MG/ML
.03-.4 INJECTION, SOLUTION INTRAVENOUS CONTINUOUS PRN
Status: DISCONTINUED | OUTPATIENT
Start: 2020-01-01 | End: 2020-01-01

## 2020-01-01 RX ORDER — POTASSIUM CHLORIDE 7.45 MG/ML
10 INJECTION INTRAVENOUS
Status: DISCONTINUED | OUTPATIENT
Start: 2020-01-01 | End: 2020-11-17 | Stop reason: HOSPADM

## 2020-01-01 RX ORDER — POTASSIUM CHLORIDE 1.5 G/1.58G
20-40 POWDER, FOR SOLUTION ORAL
Status: DISCONTINUED | OUTPATIENT
Start: 2020-01-01 | End: 2020-01-01

## 2020-01-01 RX ORDER — ASPIRIN 81 MG/1
81 TABLET, CHEWABLE ORAL DAILY
Status: DISCONTINUED | OUTPATIENT
Start: 2020-01-01 | End: 2020-11-17 | Stop reason: HOSPADM

## 2020-01-01 RX ORDER — CISATRACURIUM BESYLATE 10 MG/ML
4 INJECTION, SOLUTION INTRAVENOUS
Status: DISCONTINUED | OUTPATIENT
Start: 2020-01-01 | End: 2020-01-01

## 2020-01-01 RX ORDER — 3% SODIUM CHLORIDE 3 G/100ML
INJECTION, SOLUTION INTRAVENOUS CONTINUOUS
Status: DISCONTINUED | OUTPATIENT
Start: 2020-01-01 | End: 2020-01-01

## 2020-01-01 RX ORDER — LIDOCAINE 40 MG/G
CREAM TOPICAL
Status: DISCONTINUED | OUTPATIENT
Start: 2020-01-01 | End: 2020-11-17 | Stop reason: HOSPADM

## 2020-01-01 RX ORDER — ACETAMINOPHEN AND CODEINE PHOSPHATE 120; 12 MG/5ML; MG/5ML
0.35 SOLUTION ORAL DAILY
Status: DISCONTINUED | OUTPATIENT
Start: 2020-01-01 | End: 2020-11-17 | Stop reason: HOSPADM

## 2020-01-01 RX ADMIN — TICAGRELOR 90 MG: 90 TABLET ORAL at 20:16

## 2020-01-01 RX ADMIN — LEVOTHYROXINE SODIUM 50 MCG/HR: 20 INJECTION, SOLUTION INTRAVENOUS at 20:10

## 2020-01-01 RX ADMIN — PIPERACILLIN AND TAZOBACTAM 3.38 G: 3; .375 INJECTION, POWDER, FOR SOLUTION INTRAVENOUS at 10:40

## 2020-01-01 RX ADMIN — Medication 2 MG/HR: at 00:21

## 2020-01-01 RX ADMIN — TICAGRELOR 90 MG: 90 TABLET ORAL at 08:56

## 2020-01-01 RX ADMIN — MAGNESIUM SULFATE 2 G: 2 INJECTION INTRAVENOUS at 17:58

## 2020-01-01 RX ADMIN — POTASSIUM CHLORIDE 20 MEQ: 29.8 INJECTION, SOLUTION INTRAVENOUS at 05:04

## 2020-01-01 RX ADMIN — MULTIVITAMIN 15 ML: LIQUID ORAL at 07:49

## 2020-01-01 RX ADMIN — TICAGRELOR 90 MG: 90 TABLET ORAL at 08:23

## 2020-01-01 RX ADMIN — ALBUMIN HUMAN 500 ML: 0.05 INJECTION, SOLUTION INTRAVENOUS at 12:41

## 2020-01-01 RX ADMIN — VANCOMYCIN HYDROCHLORIDE 1750 MG: 10 INJECTION, POWDER, LYOPHILIZED, FOR SOLUTION INTRAVENOUS at 21:19

## 2020-01-01 RX ADMIN — POTASSIUM CHLORIDE 20 MEQ: 29.8 INJECTION, SOLUTION INTRAVENOUS at 01:11

## 2020-01-01 RX ADMIN — NORETHINDRONE 0.35 MG: 0.35 TABLET ORAL at 07:54

## 2020-01-01 RX ADMIN — HYDRALAZINE HYDROCHLORIDE 10 MG: 20 INJECTION, SOLUTION INTRAMUSCULAR; INTRAVENOUS at 01:20

## 2020-01-01 RX ADMIN — PIPERACILLIN AND TAZOBACTAM 3.38 G: 3; .375 INJECTION, POWDER, FOR SOLUTION INTRAVENOUS at 22:54

## 2020-01-01 RX ADMIN — Medication: at 08:03

## 2020-01-01 RX ADMIN — CARBOXYMETHYLCELLULOSE SODIUM 2 DROP: 5 SOLUTION/ DROPS OPHTHALMIC at 20:45

## 2020-01-01 RX ADMIN — PIPERACILLIN AND TAZOBACTAM 3.38 G: 3; .375 INJECTION, POWDER, FOR SOLUTION INTRAVENOUS at 17:21

## 2020-01-01 RX ADMIN — PANTOPRAZOLE SODIUM 40 MG: 40 INJECTION, POWDER, FOR SOLUTION INTRAVENOUS at 08:14

## 2020-01-01 RX ADMIN — NICARDIPINE HYDROCHLORIDE 7.5 MG/HR: 0.2 INJECTION, SOLUTION INTRAVENOUS at 18:38

## 2020-01-01 RX ADMIN — PIPERACILLIN AND TAZOBACTAM 3.38 G: 3; .375 INJECTION, POWDER, FOR SOLUTION INTRAVENOUS at 16:44

## 2020-01-01 RX ADMIN — DOCUSATE SODIUM 50 MG AND SENNOSIDES 8.6 MG 1 TABLET: 8.6; 5 TABLET, FILM COATED ORAL at 07:57

## 2020-01-01 RX ADMIN — ASPIRIN 81 MG: 81 TABLET, CHEWABLE ORAL at 08:14

## 2020-01-01 RX ADMIN — CALCIUM CHLORIDE 1 G: 100 INJECTION, SOLUTION INTRAVENOUS at 13:28

## 2020-01-01 RX ADMIN — TICAGRELOR 90 MG: 90 TABLET ORAL at 07:46

## 2020-01-01 RX ADMIN — CANGRELOR 4 MCG/KG/MIN: 50 INJECTION, POWDER, LYOPHILIZED, FOR SOLUTION INTRAVENOUS at 12:57

## 2020-01-01 RX ADMIN — POTASSIUM CHLORIDE 20 MEQ: 29.8 INJECTION, SOLUTION INTRAVENOUS at 23:10

## 2020-01-01 RX ADMIN — NICARDIPINE HYDROCHLORIDE 10 MG/HR: 0.2 INJECTION, SOLUTION INTRAVENOUS at 07:40

## 2020-01-01 RX ADMIN — ASPIRIN 81 MG: 81 TABLET, CHEWABLE ORAL at 08:56

## 2020-01-01 RX ADMIN — POLYETHYLENE GLYCOL 3350 17 G: 17 POWDER, FOR SOLUTION ORAL at 08:56

## 2020-01-01 RX ADMIN — Medication: at 00:38

## 2020-01-01 RX ADMIN — HEPARIN SODIUM 1150 UNITS/HR: 10000 INJECTION, SOLUTION INTRAVENOUS at 19:10

## 2020-01-01 RX ADMIN — Medication 2 G: at 17:48

## 2020-01-01 RX ADMIN — VANCOMYCIN HYDROCHLORIDE 1750 MG: 1 INJECTION, POWDER, LYOPHILIZED, FOR SOLUTION INTRAVENOUS at 20:42

## 2020-01-01 RX ADMIN — HYDRALAZINE HYDROCHLORIDE 25 MG: 25 TABLET ORAL at 00:54

## 2020-01-01 RX ADMIN — NICARDIPINE HYDROCHLORIDE 7.5 MG/HR: 0.2 INJECTION, SOLUTION INTRAVENOUS at 05:00

## 2020-01-01 RX ADMIN — HYDRALAZINE HYDROCHLORIDE 25 MG: 25 TABLET ORAL at 06:00

## 2020-01-01 RX ADMIN — ASPIRIN 81 MG: 81 TABLET, CHEWABLE ORAL at 07:45

## 2020-01-01 RX ADMIN — DOCUSATE SODIUM 50 MG AND SENNOSIDES 8.6 MG 1 TABLET: 8.6; 5 TABLET, FILM COATED ORAL at 19:45

## 2020-01-01 RX ADMIN — NICARDIPINE HYDROCHLORIDE 11 MG/HR: 2.5 INJECTION INTRAVENOUS at 20:30

## 2020-01-01 RX ADMIN — NICARDIPINE HYDROCHLORIDE 10 MG/HR: 0.2 INJECTION, SOLUTION INTRAVENOUS at 11:59

## 2020-01-01 RX ADMIN — TICAGRELOR 90 MG: 90 TABLET ORAL at 19:52

## 2020-01-01 RX ADMIN — Medication 50 MCG: at 00:34

## 2020-01-01 RX ADMIN — MANNITOL 100 G: 20 INJECTION, SOLUTION INTRAVENOUS at 15:32

## 2020-01-01 RX ADMIN — PIPERACILLIN AND TAZOBACTAM 3.38 G: 3; .375 INJECTION, POWDER, FOR SOLUTION INTRAVENOUS at 04:32

## 2020-01-01 RX ADMIN — SODIUM CHLORIDE: 3 INJECTION, SOLUTION INTRAVENOUS at 04:28

## 2020-01-01 RX ADMIN — HYDRALAZINE HYDROCHLORIDE 25 MG: 25 TABLET ORAL at 13:30

## 2020-01-01 RX ADMIN — ASPIRIN 81 MG: 81 TABLET, CHEWABLE ORAL at 09:13

## 2020-01-01 RX ADMIN — POTASSIUM CHLORIDE 20 MEQ: 29.8 INJECTION, SOLUTION INTRAVENOUS at 10:33

## 2020-01-01 RX ADMIN — POTASSIUM CHLORIDE 20 MEQ: 29.8 INJECTION, SOLUTION INTRAVENOUS at 20:31

## 2020-01-01 RX ADMIN — PIPERACILLIN AND TAZOBACTAM 3.38 G: 3; .375 INJECTION, POWDER, FOR SOLUTION INTRAVENOUS at 22:43

## 2020-01-01 RX ADMIN — MULTIVITAMIN 15 ML: LIQUID ORAL at 08:56

## 2020-01-01 RX ADMIN — ALBUMIN HUMAN 25 G: 0.05 INJECTION, SOLUTION INTRAVENOUS at 17:46

## 2020-01-01 RX ADMIN — TICAGRELOR 90 MG: 90 TABLET ORAL at 07:57

## 2020-01-01 RX ADMIN — POTASSIUM CHLORIDE 20 MEQ: 29.8 INJECTION, SOLUTION INTRAVENOUS at 09:58

## 2020-01-01 RX ADMIN — TICAGRELOR 90 MG: 90 TABLET ORAL at 19:45

## 2020-01-01 RX ADMIN — PIPERACILLIN AND TAZOBACTAM 3.38 G: 3; .375 INJECTION, POWDER, FOR SOLUTION INTRAVENOUS at 12:13

## 2020-01-01 RX ADMIN — SODIUM CHLORIDE 100 ML: 3 INJECTION, SOLUTION INTRAVENOUS at 18:00

## 2020-01-01 RX ADMIN — NICARDIPINE HYDROCHLORIDE 2.5 MG/HR: 0.2 INJECTION, SOLUTION INTRAVENOUS at 03:32

## 2020-01-01 RX ADMIN — SODIUM CHLORIDE: 3 INJECTION, SOLUTION INTRAVENOUS at 19:01

## 2020-01-01 RX ADMIN — PANTOPRAZOLE SODIUM 40 MG: 40 INJECTION, POWDER, FOR SOLUTION INTRAVENOUS at 07:45

## 2020-01-01 RX ADMIN — PANTOPRAZOLE SODIUM 40 MG: 40 INJECTION, POWDER, FOR SOLUTION INTRAVENOUS at 08:48

## 2020-01-01 RX ADMIN — POLYETHYLENE GLYCOL 3350 17 G: 17 POWDER, FOR SOLUTION ORAL at 07:57

## 2020-01-01 RX ADMIN — NORETHINDRONE 0.35 MG: 0.35 TABLET ORAL at 08:56

## 2020-01-01 RX ADMIN — DOCUSATE SODIUM 50 MG AND SENNOSIDES 8.6 MG 1 TABLET: 8.6; 5 TABLET, FILM COATED ORAL at 19:39

## 2020-01-01 RX ADMIN — TICAGRELOR 90 MG: 90 TABLET ORAL at 20:10

## 2020-01-01 RX ADMIN — PIPERACILLIN AND TAZOBACTAM 3.38 G: 3; .375 INJECTION, POWDER, FOR SOLUTION INTRAVENOUS at 10:44

## 2020-01-01 RX ADMIN — CALCIUM CHLORIDE 1 G: 100 INJECTION, SOLUTION INTRAVENOUS at 17:32

## 2020-01-01 RX ADMIN — HEPARIN SODIUM 700 UNITS/HR: 10000 INJECTION, SOLUTION INTRAVENOUS at 20:59

## 2020-01-01 RX ADMIN — EPINEPHRINE 0.1 MCG/KG/MIN: 1 INJECTION PARENTERAL at 15:20

## 2020-01-01 RX ADMIN — ALBUMIN HUMAN 25 G: 0.05 INJECTION, SOLUTION INTRAVENOUS at 17:00

## 2020-01-01 RX ADMIN — DOCUSATE SODIUM 50 MG AND SENNOSIDES 8.6 MG 1 TABLET: 8.6; 5 TABLET, FILM COATED ORAL at 08:56

## 2020-01-01 RX ADMIN — PIPERACILLIN AND TAZOBACTAM 3.38 G: 3; .375 INJECTION, POWDER, FOR SOLUTION INTRAVENOUS at 17:32

## 2020-01-01 RX ADMIN — NICARDIPINE HYDROCHLORIDE 5 MG/HR: 0.2 INJECTION, SOLUTION INTRAVENOUS at 10:33

## 2020-01-01 RX ADMIN — PIPERACILLIN AND TAZOBACTAM 3.38 G: 3; .375 INJECTION, POWDER, FOR SOLUTION INTRAVENOUS at 11:10

## 2020-01-01 RX ADMIN — HEPARIN SODIUM 1350 UNITS/HR: 10000 INJECTION, SOLUTION INTRAVENOUS at 17:13

## 2020-01-01 RX ADMIN — HUMAN INSULIN 1 UNITS/HR: 100 INJECTION, SOLUTION SUBCUTANEOUS at 00:07

## 2020-01-01 RX ADMIN — PIPERACILLIN AND TAZOBACTAM 3.38 G: 3; .375 INJECTION, POWDER, FOR SOLUTION INTRAVENOUS at 10:29

## 2020-01-01 RX ADMIN — Medication 50 MCG/HR: at 20:22

## 2020-01-01 RX ADMIN — TICAGRELOR 90 MG: 90 TABLET ORAL at 19:30

## 2020-01-01 RX ADMIN — Medication 100 MCG/HR: at 18:15

## 2020-01-01 RX ADMIN — NICARDIPINE HYDROCHLORIDE 7.5 MG/HR: 0.2 INJECTION, SOLUTION INTRAVENOUS at 19:09

## 2020-01-01 RX ADMIN — NORETHINDRONE 0.35 MG: 0.35 TABLET ORAL at 07:52

## 2020-01-01 RX ADMIN — TICAGRELOR 90 MG: 90 TABLET ORAL at 20:07

## 2020-01-01 RX ADMIN — PIPERACILLIN AND TAZOBACTAM 3.38 G: 3; .375 INJECTION, POWDER, FOR SOLUTION INTRAVENOUS at 22:58

## 2020-01-01 RX ADMIN — MAGNESIUM SULFATE 2 G: 2 INJECTION INTRAVENOUS at 20:29

## 2020-01-01 RX ADMIN — SODIUM CHLORIDE 2000 MG: 9 INJECTION, SOLUTION INTRAVENOUS at 20:10

## 2020-01-01 RX ADMIN — PIPERACILLIN AND TAZOBACTAM 3.38 G: 3; .375 INJECTION, POWDER, FOR SOLUTION INTRAVENOUS at 15:10

## 2020-01-01 RX ADMIN — Medication: at 00:10

## 2020-01-01 RX ADMIN — SODIUM PHOSPHATE, MONOBASIC, MONOHYDRATE AND SODIUM PHOSPHATE, DIBASIC, ANHYDROUS 15 MMOL: 276; 142 INJECTION, SOLUTION INTRAVENOUS at 18:41

## 2020-01-01 RX ADMIN — TICAGRELOR 90 MG: 90 TABLET ORAL at 07:49

## 2020-01-01 RX ADMIN — PIPERACILLIN AND TAZOBACTAM 3.38 G: 3; .375 INJECTION, POWDER, FOR SOLUTION INTRAVENOUS at 20:47

## 2020-01-01 RX ADMIN — VANCOMYCIN HYDROCHLORIDE 1750 MG: 10 INJECTION, POWDER, LYOPHILIZED, FOR SOLUTION INTRAVENOUS at 21:06

## 2020-01-01 RX ADMIN — CANGRELOR 4 MCG/KG/MIN: 50 INJECTION, POWDER, LYOPHILIZED, FOR SOLUTION INTRAVENOUS at 17:48

## 2020-01-01 RX ADMIN — CANGRELOR 4 MCG/KG/MIN: 50 INJECTION, POWDER, LYOPHILIZED, FOR SOLUTION INTRAVENOUS at 20:13

## 2020-01-01 RX ADMIN — NICARDIPINE HYDROCHLORIDE 7.5 MG/HR: 0.2 INJECTION, SOLUTION INTRAVENOUS at 23:54

## 2020-01-01 RX ADMIN — HEPARIN SODIUM 1150 UNITS/HR: 10000 INJECTION, SOLUTION INTRAVENOUS at 19:06

## 2020-01-01 RX ADMIN — NICARDIPINE HYDROCHLORIDE 12 MG/HR: 2.5 INJECTION INTRAVENOUS at 13:30

## 2020-01-01 RX ADMIN — PIPERACILLIN AND TAZOBACTAM 3.38 G: 3; .375 INJECTION, POWDER, FOR SOLUTION INTRAVENOUS at 23:44

## 2020-01-01 RX ADMIN — DOCUSATE SODIUM 50 MG AND SENNOSIDES 8.6 MG 1 TABLET: 8.6; 5 TABLET, FILM COATED ORAL at 19:30

## 2020-01-01 RX ADMIN — Medication 5 MG/HR: at 20:26

## 2020-01-01 RX ADMIN — HEPARIN SODIUM 1250 UNITS/HR: 10000 INJECTION, SOLUTION INTRAVENOUS at 12:49

## 2020-01-01 RX ADMIN — PIPERACILLIN AND TAZOBACTAM 3.38 G: 3; .375 INJECTION, POWDER, FOR SOLUTION INTRAVENOUS at 18:44

## 2020-01-01 RX ADMIN — PIPERACILLIN AND TAZOBACTAM 3.38 G: 3; .375 INJECTION, POWDER, FOR SOLUTION INTRAVENOUS at 07:49

## 2020-01-01 RX ADMIN — TICAGRELOR 90 MG: 90 TABLET ORAL at 19:39

## 2020-01-01 RX ADMIN — VANCOMYCIN HYDROCHLORIDE 1500 MG: 10 INJECTION, POWDER, LYOPHILIZED, FOR SOLUTION INTRAVENOUS at 19:53

## 2020-01-01 RX ADMIN — MULTIVITAMIN 15 ML: LIQUID ORAL at 07:55

## 2020-01-01 RX ADMIN — ASPIRIN 81 MG: 81 TABLET, CHEWABLE ORAL at 07:57

## 2020-01-01 RX ADMIN — TICAGRELOR 180 MG: 90 TABLET ORAL at 09:13

## 2020-01-01 RX ADMIN — POTASSIUM CHLORIDE 20 MEQ: 29.8 INJECTION, SOLUTION INTRAVENOUS at 20:37

## 2020-01-01 RX ADMIN — NICARDIPINE HYDROCHLORIDE 10.5 MG/HR: 2.5 INJECTION INTRAVENOUS at 11:45

## 2020-01-01 RX ADMIN — EPINEPHRINE 0.03 MCG/KG/MIN: 1 INJECTION PARENTERAL at 06:41

## 2020-01-01 RX ADMIN — NICARDIPINE HYDROCHLORIDE 12 MG/HR: 2.5 INJECTION INTRAVENOUS at 05:04

## 2020-01-01 RX ADMIN — CALCIUM CHLORIDE 1 G: 100 INJECTION, SOLUTION INTRAVENOUS at 09:23

## 2020-01-01 RX ADMIN — PANTOPRAZOLE SODIUM 40 MG: 40 INJECTION, POWDER, FOR SOLUTION INTRAVENOUS at 08:56

## 2020-01-01 RX ADMIN — POTASSIUM CHLORIDE 20 MEQ: 29.8 INJECTION, SOLUTION INTRAVENOUS at 04:37

## 2020-01-01 RX ADMIN — EXAMETAZIME 20 MILLICURIE: KIT at 14:03

## 2020-01-01 RX ADMIN — ASPIRIN 81 MG: 81 TABLET, CHEWABLE ORAL at 07:49

## 2020-01-01 RX ADMIN — PANTOPRAZOLE SODIUM 40 MG: 40 INJECTION, POWDER, FOR SOLUTION INTRAVENOUS at 07:49

## 2020-01-01 RX ADMIN — PHENYLEPHRINE HYDROCHLORIDE 1 MCG/KG/MIN: 10 INJECTION INTRAVENOUS at 12:47

## 2020-01-01 RX ADMIN — PIPERACILLIN AND TAZOBACTAM 3.38 G: 3; .375 INJECTION, POWDER, FOR SOLUTION INTRAVENOUS at 18:10

## 2020-01-01 RX ADMIN — MAGNESIUM SULFATE 2 G: 2 INJECTION INTRAVENOUS at 12:13

## 2020-01-01 RX ADMIN — ASPIRIN 81 MG: 81 TABLET, CHEWABLE ORAL at 08:22

## 2020-01-01 RX ADMIN — POLYETHYLENE GLYCOL 3350 17 G: 17 POWDER, FOR SOLUTION ORAL at 19:30

## 2020-01-01 RX ADMIN — PIPERACILLIN AND TAZOBACTAM 3.38 G: 3; .375 INJECTION, POWDER, FOR SOLUTION INTRAVENOUS at 04:35

## 2020-01-01 RX ADMIN — PANTOPRAZOLE SODIUM 40 MG: 40 INJECTION, POWDER, FOR SOLUTION INTRAVENOUS at 08:23

## 2020-01-01 RX ADMIN — VANCOMYCIN HYDROCHLORIDE 1750 MG: 10 INJECTION, POWDER, LYOPHILIZED, FOR SOLUTION INTRAVENOUS at 21:16

## 2020-01-01 RX ADMIN — SODIUM CHLORIDE 100 ML: 3 INJECTION, SOLUTION INTRAVENOUS at 16:19

## 2020-01-01 RX ADMIN — NORETHINDRONE 0.35 MG: 0.35 TABLET ORAL at 19:39

## 2020-01-01 RX ADMIN — PIPERACILLIN AND TAZOBACTAM 3.38 G: 3; .375 INJECTION, POWDER, FOR SOLUTION INTRAVENOUS at 05:02

## 2020-01-01 RX ADMIN — NICARDIPINE HYDROCHLORIDE 8.5 MG/HR: 0.2 INJECTION, SOLUTION INTRAVENOUS at 03:51

## 2020-01-01 RX ADMIN — POTASSIUM CHLORIDE 20 MEQ: 29.8 INJECTION, SOLUTION INTRAVENOUS at 10:59

## 2020-01-01 RX ADMIN — FUROSEMIDE 60 MG: 10 INJECTION, SOLUTION INTRAVENOUS at 10:59

## 2020-01-01 RX ADMIN — POLYETHYLENE GLYCOL 3350 17 G: 17 POWDER, FOR SOLUTION ORAL at 07:45

## 2020-01-01 RX ADMIN — SODIUM PHOSPHATE, MONOBASIC, MONOHYDRATE AND SODIUM PHOSPHATE, DIBASIC, ANHYDROUS 10 MMOL: 276; 142 INJECTION, SOLUTION INTRAVENOUS at 05:45

## 2020-01-01 RX ADMIN — MULTIVITAMIN 15 ML: LIQUID ORAL at 19:38

## 2020-01-01 RX ADMIN — Medication 0.03 MCG/KG/MIN: at 10:53

## 2020-01-01 RX ADMIN — MULTIVITAMIN 15 ML: LIQUID ORAL at 08:14

## 2020-01-01 RX ADMIN — TICAGRELOR 90 MG: 90 TABLET ORAL at 08:14

## 2020-01-01 RX ADMIN — POTASSIUM CHLORIDE 20 MEQ: 1.5 POWDER, FOR SOLUTION ORAL at 17:22

## 2020-01-01 RX ADMIN — POTASSIUM CHLORIDE 20 MEQ: 29.8 INJECTION, SOLUTION INTRAVENOUS at 05:01

## 2020-01-01 RX ADMIN — NICARDIPINE HYDROCHLORIDE 9 MG/HR: 0.2 INJECTION, SOLUTION INTRAVENOUS at 09:35

## 2020-01-01 RX ADMIN — SODIUM CHLORIDE, POTASSIUM CHLORIDE, SODIUM LACTATE AND CALCIUM CHLORIDE 1000 ML: 600; 310; 30; 20 INJECTION, SOLUTION INTRAVENOUS at 16:15

## 2020-01-01 RX ADMIN — HEPARIN SODIUM 3 ML/HR: 200 INJECTION, SOLUTION INTRAVENOUS at 17:52

## 2020-01-01 RX ADMIN — Medication: at 00:21

## 2020-01-01 RX ADMIN — HEPARIN SODIUM 1250 UNITS/HR: 10000 INJECTION, SOLUTION INTRAVENOUS at 15:45

## 2020-01-01 RX ADMIN — HUMAN INSULIN 2 UNITS/HR: 100 INJECTION, SOLUTION SUBCUTANEOUS at 19:18

## 2020-01-01 RX ADMIN — Medication: at 16:44

## 2020-01-01 RX ADMIN — PIPERACILLIN AND TAZOBACTAM 3.38 G: 3; .375 INJECTION, POWDER, FOR SOLUTION INTRAVENOUS at 06:05

## 2020-01-01 RX ADMIN — Medication 50 MCG: at 01:18

## 2020-01-01 RX ADMIN — DOCUSATE SODIUM 50 MG AND SENNOSIDES 8.6 MG 1 TABLET: 8.6; 5 TABLET, FILM COATED ORAL at 07:45

## 2020-01-01 RX ADMIN — SODIUM PHOSPHATE, MONOBASIC, MONOHYDRATE AND SODIUM PHOSPHATE, DIBASIC, ANHYDROUS 20 MMOL: 276; 142 INJECTION, SOLUTION INTRAVENOUS at 06:35

## 2020-01-01 RX ADMIN — NICARDIPINE HYDROCHLORIDE 7.5 MG/HR: 0.2 INJECTION, SOLUTION INTRAVENOUS at 00:08

## 2020-01-01 RX ADMIN — PANTOPRAZOLE SODIUM 40 MG: 40 INJECTION, POWDER, FOR SOLUTION INTRAVENOUS at 07:57

## 2020-01-01 RX ADMIN — PIPERACILLIN AND TAZOBACTAM 3.38 G: 3; .375 INJECTION, POWDER, FOR SOLUTION INTRAVENOUS at 05:29

## 2020-01-01 RX ADMIN — Medication: at 02:23

## 2020-01-01 RX ADMIN — Medication 100 MCG/HR: at 18:29

## 2020-01-01 RX ADMIN — Medication 5 MG/HR: at 03:50

## 2020-01-01 RX ADMIN — PIPERACILLIN AND TAZOBACTAM 3.38 G: 3; .375 INJECTION, POWDER, FOR SOLUTION INTRAVENOUS at 22:36

## 2020-01-01 ASSESSMENT — ACTIVITIES OF DAILY LIVING (ADL)
ADLS_ACUITY_SCORE: 19
ADLS_ACUITY_SCORE: 19
ADLS_ACUITY_SCORE: 22
ADLS_ACUITY_SCORE: 19
ADLS_ACUITY_SCORE: 19
ADLS_ACUITY_SCORE: 22
ADLS_ACUITY_SCORE: 22
ADLS_ACUITY_SCORE: 19
ADLS_ACUITY_SCORE: 22
ADLS_ACUITY_SCORE: 22
ADLS_ACUITY_SCORE: 19
ADLS_ACUITY_SCORE: 22
ADLS_ACUITY_SCORE: 19
ADLS_ACUITY_SCORE: 19
ADLS_ACUITY_SCORE: 22
ADLS_ACUITY_SCORE: 19
ADLS_ACUITY_SCORE: 22
ADLS_ACUITY_SCORE: 22
ADLS_ACUITY_SCORE: 19
ADLS_ACUITY_SCORE: 22
ADLS_ACUITY_SCORE: 19
ADLS_ACUITY_SCORE: 22
ADLS_ACUITY_SCORE: 19
ADLS_ACUITY_SCORE: 22
ADLS_ACUITY_SCORE: 19
ADLS_ACUITY_SCORE: 22
ADLS_ACUITY_SCORE: 22
ADLS_ACUITY_SCORE: 19
ADLS_ACUITY_SCORE: 19
ADLS_ACUITY_SCORE: 22
ADLS_ACUITY_SCORE: 19
ADLS_ACUITY_SCORE: 19
ADLS_ACUITY_SCORE: 22
ADLS_ACUITY_SCORE: 19

## 2020-01-01 ASSESSMENT — MIFFLIN-ST. JEOR
SCORE: 1629.75
SCORE: 1352.75
SCORE: 1247.75
SCORE: 1265.75
SCORE: 1629.75
SCORE: 1633.75
SCORE: 1625.75

## 2020-10-02 PROBLEM — I46.9 CARDIAC ARREST (H): Status: ACTIVE | Noted: 2020-01-01

## 2020-10-02 NOTE — PHARMACY-VANCOMYCIN DOSING SERVICE
Pharmacy Vancomycin Initial Note  Date of Service 2020  Patient's  1898  89 year old, female    Indication: Aspiration Pneumonia    Current estimated CrCl = Estimated Creatinine Clearance: 19.7 mL/min (based on SCr of 0.86 mg/dL).    Creatinine for last 3 days  10/2/2020:  3:56 PM Creatinine 0.86 mg/dL    Recent Vancomycin Level(s) for last 3 days  No results found for requested labs within last 72 hours.      Vancomycin IV Administrations (past 72 hours)      No vancomycin orders with administrations in past 72 hours.                Nephrotoxins and other renal medications (From now, onward)    Start     Dose/Rate Route Frequency Ordered Stop    10/02/20 1930  vancomycin (VANCOCIN) 1,750 mg in sodium chloride 0.9 % 500 mL intermittent infusion      1,750 mg  over 2 Hours Intravenous EVERY 24 HOURS 10/02/20 1812 10/07/20 1929    10/02/20 1700  piperacillin-tazobactam (ZOSYN) 3.375 g vial to attach to  mL bag      3.375 g  over 30 Minutes Intravenous EVERY 6 HOURS 10/02/20 1554 10/07/20 1659    10/02/20 1600  phenylephrine (NOAH-SYNEPHRINE) 50 mg in sodium chloride 0.9 % 250 mL infusion      0.5-6 mcg/kg/min × 90 kg (Dosing Weight)  13.5-162 mL/hr  Intravenous CONTINUOUS 10/02/20 1554      10/02/20 1600  vasopressin 40 units in NS 40 mL (PITRESSIN) infusion      0.5-4 Units/hr  0.5-4 mL/hr  Intravenous CONTINUOUS 10/02/20 1554      10/02/20 1554  norepinephrine (LEVOPHED) 16 mg in  mL infusion      0.03-0.4 mcg/kg/min × 90 kg (Dosing Weight)  2.5-33.8 mL/hr  Intravenous CONTINUOUS PRN 10/02/20 1554            Contrast Orders - past 72 hours (72h ago, onward)    Start     Dose/Rate Route Frequency Ordered Stop    10/02/20 1404  iopamidol (ISOVUE-370) solution  Status:  Discontinued        ONCE PRN 10/02/20 1404 10/02/20 1551                Plan:  1.  Start vancomycin  1750 mg IV q24h.   2.  Goal Trough Level: 15-20 mg/L   3.  Pharmacy will check trough levels as appropriate in 1-3 Days.     4. Serum creatinine levels will be ordered daily for the first week of therapy and at least twice weekly for subsequent weeks.    5. Knox method utilized to dose vancomycin therapy: Method 2

## 2020-10-02 NOTE — Clinical Note
Potential access sites were evaluated for patency using ultrasound.   The right femoral artery and left femoral vein were selected. Access was obtained under Sonosite and Fluoroscopic guidance using a standard 18 gauge needle with direct visualization of needle entry.

## 2020-10-02 NOTE — PROGRESS NOTES
Patient arrived intubated from OSH and in Cath lab IABP placed on left femoral artery.  Current vent settings: CMV 12/400 +7 50%, ETT 26 cm @lips. Suctioned bloody secretions. RT will continue to monitor and support with plan of care.  Noa Zavala RT

## 2020-10-02 NOTE — Clinical Note
The first balloon was inserted into the left anterior descending and proximal left anterior descending.Max pressure = 12 skyler. Total duration = 10 seconds.     Max pressure = 12 skyler. Total duration = 10 seconds.    Balloon reinflated a second time: Max pressure = 12 skyler. Total duration = 10 seconds.

## 2020-10-02 NOTE — Clinical Note
Potential access sites were evaluated for patency using ultrasound.   The left femoral artery was selected. Access was obtained under with Sonosite guidance using a standard 18 guage needle with direct visualization of needle entry.      Chief Complaint:  Patient is a 75y old  Female who presents with a chief complaint of Syncopal Episode (15 Feb 2019 14:03)    HPI:  75F with breast cancer (on chemotherapy, last dose 1 weeks prior to admission), CKD3, HTN, DM2 (A1c 7.6%), presented to [facility] ED from home with 1 day of watery diarrhea and hypoxia on 2/5/19. During her admission, the patient had episodes of respiratory distress initially requiring BICAP and then intubation. The patient was found to have severe sepsis 2/2 PNA, legionella urine antigen positive. The patient has been on Azithromycin. GI was called to evaluate patient for diarrhea. As per patient and daughter who is present at bedside, the patient's diarrhea started 1 day prior to admission. The diarrhea is watery and not associated with abdominal pain, nausea, vomiting. She denies any blood in her stool. She has never had a h/o chronic diarrhea in the past. She has never had an EGD or colonoscopy. She is tolerating PO but prefers to have liquids currently rather than solid food, her appetite is poor. Patient was started on loperamide yesterday afternoon.     Allergies:  codeine (Nausea)  No Known Allergies      Home Medications:  reviewed     Hospital Medications:  acetaminophen   Tablet .. 650 milliGRAM(s) Oral every 6 hours PRN  chlorhexidine 4% Liquid 1 Application(s) Topical <User Schedule>  dextrose 40% Gel 15 Gram(s) Oral once PRN  dextrose 5%. 1000 milliLiter(s) IV Continuous <Continuous>  dextrose 50% Injectable 12.5 Gram(s) IV Push once  dextrose 50% Injectable 25 Gram(s) IV Push once  dextrose 50% Injectable 25 Gram(s) IV Push once  glucagon  Injectable 1 milliGRAM(s) IntraMuscular once PRN  heparin  Injectable 5000 Unit(s) SubCutaneous every 8 hours  insulin lispro (HumaLOG) corrective regimen sliding scale   SubCutaneous at bedtime  insulin lispro (HumaLOG) corrective regimen sliding scale   SubCutaneous three times a day before meals  levoFLOXacin  Tablet 500 milliGRAM(s) Oral once  loperamide 2 milliGRAM(s) Oral every 12 hours  sodium bicarbonate 650 milliGRAM(s) Oral three times a day  sodium chloride 0.9%. 1000 milliLiter(s) IV Continuous <Continuous>  sucralfate 1 Gram(s) Oral four times a day      PMHX/PSHX:  CKD (chronic kidney disease) stage 3, GFR 30-59 ml/min  Intraductal carcinoma in situ of left breast  Malignant neoplasm of right breast  Carpal tunnel syndrome of left wrist  Carpal tunnel syndrome on left  Ovarian cyst  Hypertension  Primary osteoarthritis of right knee  Primary osteoarthritis of left knee  Gout  Cataract  Diabetes mellitus, type 2  CKD (chronic kidney disease)  Arthritis  Spinal stenosis of lumbosacral region  S/P total knee arthroplasty, right  S/P carpal tunnel release  Lipoma of chest wall  Lipoma of neck  History of right salpingo-oophorectomy  S/P appendectomy      Family history:  No pertinent family history in first degree relatives  Acute myocardial infarction      Social History: non-contributory    ROS:     General:  No wt loss, fevers, chills, night sweats, fatigue,   Eyes:  Good vision, no reported pain  ENT:  No sore throat, pain, runny nose, dysphagia  CV:  No pain, palpitations, hypo/hypertension  Resp:  No dyspnea, cough, tachypnea, wheezing  GI:  No pain, No nausea, No vomiting, + diarrhea, No constipation, No weight loss, No fever, No pruritis, No rectal bleeding, No tarry stools, No dysphagia,  :  No pain, bleeding, incontinence, nocturia  Muscle:  No pain, weakness  Neuro:  No weakness, tingling, memory problems  Psych:  No fatigue, insomnia, mood problems, depression  Endocrine:  No polyuria, polydipsia, cold/heat intolerance  Heme:  No petechiae, ecchymosis, easy bruisability  Skin:  No rash, tattoos, scars, edema      PHYSICAL EXAM:     GENERAL:  Appears stated age, well-groomed, well-nourished, no distress  HEENT:  NC/AT,  conjunctivae clear and pink, no thyromegaly, nodules, adenopathy, no JVD, sclera -anicteric  CHEST:  Full & symmetric excursion, no increased effort, breath sounds clear  HEART:  Regular rhythm, S1, S2, no murmur/rub/S3/S4, no abdominal bruit, no edema  ABDOMEN:  Soft, non-tender, non-distended, normoactive bowel sounds +rectal tube (green stool)   EXTEREMITIES:  no cyanosis,clubbing or edema  SKIN:  No rash/erythema, intact   NEURO:  Alert, oriented, no asterixis, no tremor, no encephalopathy    Vital Signs:  Vital Signs Last 24 Hrs  T(C): 37.2 (15 Feb 2019 05:45), Max: 37.2 (15 Feb 2019 05:45)  T(F): 98.9 (15 Feb 2019 05:45), Max: 98.9 (15 Feb 2019 05:45)  HR: 87 (15 Feb 2019 05:45) (83 - 95)  BP: 149/72 (15 Feb 2019 05:45) (149/72 - 169/72)  BP(mean): --  RR: 20 (15 Feb 2019 05:45) (18 - 20)  SpO2: 99% (15 Feb 2019 05:45) (99% - 100%)  Daily     Daily     LABS:                        7.4    9.93  )-----------( 308      ( 15 Feb 2019 05:44 )             22.8     Mean Cell Volume: 87.0 fL (02-15-19 @ 05:44)    02-15    141  |  108<H>  |  86<H>  ----------------------------<  170<H>  3.3<L>   |  17<L>  |  2.36<H>    Ca    9.4      15 Feb 2019 05:44  Phos  4.3     02-15  Mg     2.1     02-15    TPro  6.5  /  Alb  2.9<L>  /  TBili  0.3  /  DBili  x   /  AST  38<H>  /  ALT  43<H>  /  AlkPhos  123<H>  02-15    LIVER FUNCTIONS - ( 15 Feb 2019 05:44 )  Alb: 2.9 g/dL / Pro: 6.5 g/dL / ALK PHOS: 123 u/L / ALT: 43 u/L / AST: 38 u/L / GGT: x           Clostridium difficile Toxin by PCR (02.08.19 @ 11:19)    Clostridium difficile Toxin by PCR: NotDetec: The results of this test should be interpreted with  consideration of all clinical and laboratory findings. C.  difficile PCR is more sensitive and specific than EIA,  therefore, repeat testing during one episode does not  provide additional clinically useful information. One test  per episode is sufficient for determining C. difficile  infection status.    A result of C. difficile tcdB gene not detected does not  preclude the possibility of colonization with C. difficile.  Negative results may occur from improper specimen  collection, handling or storage, or because the number of  organisms in the specimen is below the analytical  sensitivity of the test.    This test is performed on the Cepheid Gene Xpert detection  system which automates and integrates sample purification,  Nucleic acid amplification and detection of the target  sequence in simple or complex samples using real-time PCR  and RT-PCR assays.      GI PCR Panel, Stool (02.06.19 @ 15:08)    GI PCR Panel, Stool:   GI panel PCR evaluates for:  Campylobacter, Plesiomonas shigelloides, Salmonella,  Yersinia enterocolitica, Vibrio, Enteroaggregative  Escherichia coli (EAEC), Enteropathogenic E. coli (EPEC),  Enterotoxigenic E. coli (ETEC) lt/st, Shiga-like  toxin-producing E. coli (STEC) stx1/stx2,  Shigella/Enteroinvasive E. coli (EIEC), Cryptosporidium,  Cyclospora cayetanensis, Entamoeba histolytica, Giardia  lamblia, Adenovirus F 40/41, Astrovirus, Norovirus GI/GII,  Rotavirus A, Sapovirus  **********************************************************  FINAL REPORT:  GI PCR Results:  NOT DETECTED  TEST PERFORMED AT  [facility]  [address] [place] [address]    Specimen Source: FECES                                7.4    9.93  )-----------( 308      ( 15 Feb 2019 05:44 )             22.8                         7.9    11.01 )-----------( 274      ( 14 Feb 2019 05:45 )             24.2                         8.2    14.34 )-----------( 251      ( 13 Feb 2019 05:30 )             24.7     Imaging:  no new imaging Chief Complaint:  Patient is a 75y old  Female who presents with a chief complaint of Syncopal Episode (15 Feb 2019 14:03)    HPI:  75F with breast cancer (on chemotherapy, last dose 1 weeks prior to admission), CKD3, HTN, DM2 (A1c 7.6%), presented to [facility] ED from home with 1 day of watery diarrhea and hypoxia on 2/5/19. During her admission, the patient had episodes of respiratory distress initially requiring BICAP and then intubation. The patient was found to have severe sepsis 2/2 PNA, legionella urine antigen positive. The patient has been on Azithromycin. GI was called to evaluate patient for diarrhea. As per patient and daughter who is present at bedside, the patient's diarrhea started 1 day prior to admission. The diarrhea is watery and not associated with abdominal pain, nausea, vomiting. She denies any blood in her stool. She has never had a h/o chronic diarrhea in the past. She has never had an EGD or colonoscopy. She is tolerating PO but prefers to have liquids currently rather than solid food, her appetite is poor. Patient was started on loperamide yesterday afternoon.     Allergies:  codeine (Nausea)  No Known Allergies      Home Medications:  reviewed     Hospital Medications:  acetaminophen   Tablet .. 650 milliGRAM(s) Oral every 6 hours PRN  chlorhexidine 4% Liquid 1 Application(s) Topical <User Schedule>  dextrose 40% Gel 15 Gram(s) Oral once PRN  dextrose 5%. 1000 milliLiter(s) IV Continuous <Continuous>  dextrose 50% Injectable 12.5 Gram(s) IV Push once  dextrose 50% Injectable 25 Gram(s) IV Push once  dextrose 50% Injectable 25 Gram(s) IV Push once  glucagon  Injectable 1 milliGRAM(s) IntraMuscular once PRN  heparin  Injectable 5000 Unit(s) SubCutaneous every 8 hours  insulin lispro (HumaLOG) corrective regimen sliding scale   SubCutaneous at bedtime  insulin lispro (HumaLOG) corrective regimen sliding scale   SubCutaneous three times a day before meals  levoFLOXacin  Tablet 500 milliGRAM(s) Oral once  loperamide 2 milliGRAM(s) Oral every 12 hours  sodium bicarbonate 650 milliGRAM(s) Oral three times a day  sodium chloride 0.9%. 1000 milliLiter(s) IV Continuous <Continuous>  sucralfate 1 Gram(s) Oral four times a day      PMHX/PSHX:  CKD (chronic kidney disease) stage 3, GFR 30-59 ml/min  Intraductal carcinoma in situ of left breast  Malignant neoplasm of right breast  Carpal tunnel syndrome of left wrist  Carpal tunnel syndrome on left  Ovarian cyst  Hypertension  Primary osteoarthritis of right knee  Primary osteoarthritis of left knee  Gout  Cataract  Diabetes mellitus, type 2  CKD (chronic kidney disease)  Arthritis  Spinal stenosis of lumbosacral region  S/P total knee arthroplasty, right  S/P carpal tunnel release  Lipoma of chest wall  Lipoma of neck  History of right salpingo-oophorectomy  S/P appendectomy      Family history:  No pertinent family history in first degree relatives  Acute myocardial infarction      Social History: non-contributory, denies smoking and EtOH    ROS:     General:  No wt loss, fevers, chills, night sweats, fatigue,   Eyes:  Good vision, no reported pain  ENT:  No sore throat, pain, runny nose, dysphagia  CV:  No pain, palpitations, hypo/hypertension  Resp:  No dyspnea, cough, tachypnea, wheezing  GI:  No pain, No nausea, No vomiting, + diarrhea, No constipation, No weight loss, No fever, No pruritis, No rectal bleeding, No tarry stools, No dysphagia,  :  No pain, bleeding, incontinence, nocturia  Muscle:  No pain, weakness  Neuro:  No weakness, tingling, memory problems  Psych:  No fatigue, insomnia, mood problems, depression  Endocrine:  No polyuria, polydipsia, cold/heat intolerance  Heme:  No petechiae, ecchymosis, easy bruisability  Skin:  No rash, tattoos, scars, edema      PHYSICAL EXAM:     GENERAL:  Appears stated age, well-groomed, well-nourished, no distress  HEENT:  NC/AT,  conjunctivae clear and pink, no thyromegaly, nodules, adenopathy, no JVD, sclera -anicteric  CHEST:  Full & symmetric excursion, no increased effort, breath sounds clear  HEART:  Regular rhythm, S1, S2, no murmur/rub/S3/S4, no abdominal bruit, no edema  ABDOMEN:  Soft, non-tender, non-distended, normoactive bowel sounds +rectal tube (green stool)   EXTREMITIES:  no cyanosis, clubbing or edema  SKIN:  No rash/erythema, intact   NEURO:  Alert, oriented, no asterixis, no tremor, no encephalopathy  RECTAL: green liquid stool    Vital Signs:  Vital Signs Last 24 Hrs  T(C): 37.2 (15 Feb 2019 05:45), Max: 37.2 (15 Feb 2019 05:45)  T(F): 98.9 (15 Feb 2019 05:45), Max: 98.9 (15 Feb 2019 05:45)  HR: 87 (15 Feb 2019 05:45) (83 - 95)  BP: 149/72 (15 Feb 2019 05:45) (149/72 - 169/72)  BP(mean): --  RR: 20 (15 Feb 2019 05:45) (18 - 20)  SpO2: 99% (15 Feb 2019 05:45) (99% - 100%)  Daily     Daily     LABS:                        7.4    9.93  )-----------( 308      ( 15 Feb 2019 05:44 )             22.8     Mean Cell Volume: 87.0 fL (02-15-19 @ 05:44)    02-15    141  |  108<H>  |  86<H>  ----------------------------<  170<H>  3.3<L>   |  17<L>  |  2.36<H>    Ca    9.4      15 Feb 2019 05:44  Phos  4.3     02-15  Mg     2.1     02-15    TPro  6.5  /  Alb  2.9<L>  /  TBili  0.3  /  DBili  x   /  AST  38<H>  /  ALT  43<H>  /  AlkPhos  123<H>  02-15    LIVER FUNCTIONS - ( 15 Feb 2019 05:44 )  Alb: 2.9 g/dL / Pro: 6.5 g/dL / ALK PHOS: 123 u/L / ALT: 43 u/L / AST: 38 u/L / GGT: x           Clostridium difficile Toxin by PCR (02.08.19 @ 11:19)    Clostridium difficile Toxin by PCR: NotDetec: The results of this test should be interpreted with  consideration of all clinical and laboratory findings. C.  difficile PCR is more sensitive and specific than EIA,  therefore, repeat testing during one episode does not  provide additional clinically useful information. One test  per episode is sufficient for determining C. difficile  infection status.    A result of C. difficile tcdB gene not detected does not  preclude the possibility of colonization with C. difficile.  Negative results may occur from improper specimen  collection, handling or storage, or because the number of  organisms in the specimen is below the analytical  sensitivity of the test.    This test is performed on the Cepheid Gene Xpert detection  system which automates and integrates sample purification,  Nucleic acid amplification and detection of the target  sequence in simple or complex samples using real-time PCR  and RT-PCR assays.      GI PCR Panel, Stool (02.06.19 @ 15:08)    GI PCR Panel, Stool:   GI panel PCR evaluates for:  Campylobacter, Plesiomonas shigelloides, Salmonella,  Yersinia enterocolitica, Vibrio, Enteroaggregative  Escherichia coli (EAEC), Enteropathogenic E. coli (EPEC),  Enterotoxigenic E. coli (ETEC) lt/st, Shiga-like  toxin-producing E. coli (STEC) stx1/stx2,  Shigella/Enteroinvasive E. coli (EIEC), Cryptosporidium,  Cyclospora cayetanensis, Entamoeba histolytica, Giardia  lamblia, Adenovirus F 40/41, Astrovirus, Norovirus GI/GII,  Rotavirus A, Sapovirus  **********************************************************  FINAL REPORT:  GI PCR Results:  NOT DETECTED  TEST PERFORMED AT  [facility]  [address] [place] [address]    Specimen Source: FECES                                7.4    9.93  )-----------( 308      ( 15 Feb 2019 05:44 )             22.8                         7.9    11.01 )-----------( 274      ( 14 Feb 2019 05:45 )             24.2                         8.2    14.34 )-----------( 251      ( 13 Feb 2019 05:30 )             24.7     Imaging:  no new imaging

## 2020-10-02 NOTE — Clinical Note
IABP inserted in the left femoral artery. IABP inserted with 50 cc balloon volume Lot number is: [id number]

## 2020-10-02 NOTE — Clinical Note
Stent deployed in the proximal left anterior descending. Max pressure = 16 skyler. Total duration = 10 seconds.

## 2020-10-02 NOTE — PROGRESS NOTES
ECLS Cannulation Note:    Date on: 10/2/2020  Time on: 1225  Surgeon: Kashif    Arterial Cannula: 15 Fr. In the Right Femoral Artery, 8 Fr Distal Perfusion cannula      Venous Cannula: 25 Fr. In the Left Femoral Vein      ECMO components include [employer] Lot # [id number]  Oxy Lot Number: [id number]  Console Serial Number: [id number]    Cannulation was performed in the Cath Lab, placement was verified by fluoroscopy, cannulas are in good position.  ISTAT and blood cooler are pending.    Daniel Dobbins, RRT  ECMO Specialist  10/2/2020 4:39 PM

## 2020-10-02 NOTE — CONSULTS
"[facility]  NeuroCritical Care Consult Note    Patient Name:  Sachi Bolanos  MRN:  [id number]    :  1898  Date of Service:  2020  Primary care provider:  No primary care provider on file.      Neurology consultation service was asked to see \"Sachi Bolanos\" by Dr. Sarabia to evaluate post-cardiac arrest.    History of Present Illness:   Kathi Rehman ([id number]) [Sachi Bolanos] is a Woman of unknown specific age (~46yo) with PCOS and minimal known cardiac or vascular risk factors who presents with cardiac arrest.     History obtained with chart review.  She was supposedly in her normal state of health yesterday, and per chart review seems to be near baseline and went to a dentist appointment.  Today, she was reportedly going to urgent care when she was found down.  Bystander CPR was started and went on for an unknown time.  EMS was called at 1125.  EMS called [facility] crew who arrived 15 minutes after EMS   Call.  [place] was briefly achieved at 1132, but was back in V. fib after 1 minute.  Rhythm when [facility] arrived was V. fib.  She was shocked  7 times by [facility] crew.  She received 1 mg epi and 300 mg amiodarone.  She presented to [facility] Cath Lab approximately 1 hour after the inciting event on Suraj and in asystole.  PaO2 = 69, lactate = 19 on arrival.  Angiogram revealed proximal LAD occlusion.  She received 1 stent.    She is now requiring 3 pressors for BP.  She is currently on 5 of Versed.      ROS  A 10-point ROS was  not performed due to mental status.      PMH  No past medical history on file.  No past surgical history on file.    Medications   No medications prior to admission.       Allergies  Not on File    Social History  Social History     Tobacco Use     Smoking status: Not on file   Substance Use Topics     Alcohol use: Not on file       Family History    No family history on file.      Physical Examination   Vitals: Ht " "1.676 m (5' 6\")   Wt 90 kg (198 lb 6.6 oz)   SpO2 100%   BMI 32.02 kg/m    General: Lying in bed, intubated, no spontaneous movement  Neuro: intubated, examined on sedation. Not following commands. Pupils are 2mm and sluggish to light, eyes are conjugate. Roving eye movements are not observed. No abnormal movements noted.   Other portions of the exam cannot be obtained due to temperature management.     Investigations   CTH (initial, 10/2)  Impression:  Asymmetric loss of gray-white differentiation of the right frontal  lobe, basal ganglia, and occipital lobe which likely represents  hypoxic ischemic injury. Recommend obtaining MRI to further  characterize infarct, if possible.     Impression  Kathi Rehman ([id number]) [Anonymous [place]] is a Woman of unknown specific age (~46yo) with PCOS and minimal known cardiac or vascular risk factors who presents with cardiac arrest. She is now being cooled and increased sedation for post-MI temperature management.     CT head with asymmetric loss of gray-white, likely hypoxic ischemic injury  EEG will be hooked up when able  Exam is limited by sedation and remains cooled at this time.     Recommendations  - Repeat CTH in 2-3 days  - Continue video EEG  - Temperature management per primary team      We will continue to follow along for further prognostication and evaluation. Due to acuity of the events and current management needed for cardiac cares, her current status is not optimal to determine her prognosis.     Please do not hesitate to call with questions/concerns ([phone]).      Patient was seen and discussed with Dr. Worthington.      Bonilla Mina MD  PGY-2 Neurology Resident  Ascom: [phone]  10/02/2020       "

## 2020-10-02 NOTE — Clinical Note
The first balloon was inserted into the left anterior descending and proximal left anterior descending.Max pressure = 12 skyler. Total duration = 10 seconds.     Max pressure = 18 skyler. Total duration = 10 seconds.    Balloon reinflated a second time: Max pressure = 18 skyler. Total duration = 10 seconds.  Balloon reinflated a third time: Max pressure = 18 skyler. Total duration = 10 seconds.  Balloon reinflated a fourth time: Max pressure = 18 skyler. Total duration = 10 seconds.

## 2020-10-02 NOTE — PROVIDER NOTIFICATION
Patients 1600 ABG is metabolically acidotic.  Results are pH 7.21, 29, 134, 12.  MD's notified, stated to wean sweep keeping paco2 40-50 unless pH is > 7.10.  Mykel Conway MD

## 2020-10-02 NOTE — H&P
[facility]  Interventional Cardiology History & Physical  2020          H&P:   Kathi Rehman [id number] [[place]] is a ~45 year old adult female who was admitted on 10/2/2020. Personal history of PCOS, no prior cardiac history or traditional risk factors.  Patient was reportedly going to urgent care when she suddenly was found down.  Immediate bystander CPR by clinic staff was performed and EMS was called at 1125.  [employer] flight crew arrived approximately 15 minutes after EMS call. Brief intermittent ROSC at 1132, but back to VF after 1 min. Presenting rhythm by [employer] was VF.  She was shocked 7 times by [employer], unclear if additional shocks prior to arrival.  She received 1 mg epi and 300 mg amiodarone.  Presented to [facility] Cath Lab approximately 1 hour after arrest on [facility] with asystole rhythm.  Initial labs showed a PaO2 of 69 with a lactate of 19.  After being placed on ECMO coronary angiogram revealed proximal LAD occlusion. She received 1 THELMA 3.5 x 38mm to proximal LAD.    Witnessed arest (y/n): Y  Home or public location (y/n): Urgent Care  Bystander CPR (y/n): Y  Time of 911 call: 1125  Initial rhythm: VF  Did they have intermittent ROSC (y/n): Y  # of shocks 7  Epi: 1 mg  Amio: 300 mg  Bicarb: 0 amps    Initial rhythm in the cath lab: Asystole  First AB.76/61/67/7  First Lactate 19.0  ECMO cannula size: 17/25           Medications:   I have reviewed this patient's current medications    No past medical history on file.    No family history on file.  Social History     Socioeconomic History     Marital status: Not on file     Spouse name: Not on file     Number of children: Not on file     Years of education: Not on file     Highest education level: Not on file   Occupational History     Not on file   Social Needs     Financial resource strain: Not on file     Food insecurity     Worry: Not on file     Inability: Not on file      "Transportation needs     Medical: Not on file     Non-medical: Not on file   Tobacco Use     Smoking status: Not on file   Substance and Sexual Activity     Alcohol use: Not on file     Drug use: Not on file     Sexual activity: Not on file   Lifestyle     Physical activity     Days per week: Not on file     Minutes per session: Not on file     Stress: Not on file   Relationships     Social connections     Talks on phone: Not on file     Gets together: Not on file     Attends Muslim service: Not on file     Active member of club or organization: Not on file     Attends meetings of clubs or organizations: Not on file     Relationship status: Not on file     Intimate partner violence     Fear of current or ex partner: Not on file     Emotionally abused: Not on file     Physically abused: Not on file     Forced sexual activity: Not on file   Other Topics Concern     Not on file   Social History Narrative     Not on file            Review of Systems:   Review of systems not obtained due to patient factors - intubation            Physical Exam:   @Vitals: Ht 1.676 m (5' 6\")   Wt 90 kg (198 lb 6.6 oz)   SpO2 100%   BMI 32.02 kg/m    BMI= Body mass index is 32.02 kg/m .   GENERAL APPEARANCE: Intubated, sedated. NAD.  HEENT: No icterus, PERRL 2 mm, ETT in place, OG tube in place  CARDIOVASCULAR: regular rate and rhythm, normal S1 and S2, no S3 or S4 and no murmur, click or rub. Normal PMI. Pulses dopplerable.  RESP: Coarse bilaterally. Mechanical ventilation.    GASTRO: Soft, bowel sounds hypoactive but present  GENITOURINARY: Babcock in place.  EXTREMITIES: Cool, 1+ edema, pulses dopplered, as above. Arterial ECMO cannula in right groin, Venous ECMO cannula in L groin, IABP in R groin, thermoguard in r groin and IO in place right Tibia  NEURO: Sedated and intubated, Pupils equal and fixed, 2 mm. Fent and Versed for sedation, as below.  INTEGUMENTARY: No rashes. Cannula/Line sites CDI  LINES/TUBES/DRAINS: (noted below) " V-A ECMO Cannulas R/L groin, arterial sheath in L groin and ThermoGard in R groin. R radial Arterial line. ETT. OG. Babcock Catheter.    Vent Settings:    SpO2: 100 % O2 Device: Mechanical Ventilator     CMV 12/400/7/50%    Arterial Blood Gas:   Recent Labs   Lab 10/02/20  1556 10/02/20  1331 10/02/20  1242 10/02/20  1235   PH 7.21*  7.21* 7.25* 7.03* <6.75*   PCO2 29*  29* 23* 42 52*   PO2 134*  134* >600* 274* 118*   HCO3 12*  12* 13* 11* Unable to Calculate   O2PER 70  60  60  60 100 100 100     Vitals:    10/02/20 1600   Weight: 90 kg (198 lb 6.6 oz)   I/O last 3 completed shifts:  In: 3000 [IV Piggyback:3000]  Out: -   Recent Labs   Lab 10/02/20  1556 10/02/20  1331 10/02/20  1242   NA  --  145* 140   POTASSIUM  --  3.6 3.0*   *  296* 335* 362*     No components found for: URINE   No lab results found in last 7 days.     No data recorded   Recent Labs   Lab 10/02/20  1556 10/02/20  1331 10/02/20  1242 10/02/20  1235 10/02/20  1233   WBC 19.7*  --   --   --   --    HGB 9.7* 10.7* 10.0* 12.2 12.1   HCT 28.2*  --   --   --   --    MCV 94  --   --   --   --    RDW 12.1  --   --   --   --      --   --   --   --      Recent Labs   Lab 10/02/20  1556   INR 3.53*   PTT >240*     Recent Labs   Lab 10/02/20  1556 10/02/20  1331 10/02/20  1242 10/02/20  1235 10/02/20  1233   *  296* 335* 362* 455* 455*       Lines:           Data:   All lab results reviewed    Recent Results (from the past 24 hour(s))   CT Head w/o Contrast   Result Value    Radiologist flags Hypoxic ischemic injury of the right cerebrum (Urgent)    Narrative    CT HEAD W/O CONTRAST 10/2/2020 3:15 PM    History: s/p ECMO   ICD-10: Status post ECMO    Comparison: None    Technique: Using multidetector thin collimation helical acquisition  technique, axial, coronal and sagittal CT images from the skull base  to the vertex were obtained without intravenous contrast.    Findings: There is no intracranial hemorrhage, mass effect, or  midline  shift. Asymmetric loss of gray-white differentiation of the right  frontal lobe, basal ganglia, occipital lobe likely secondary to  hypoxic ischemic injury. . Gray/white matter differentiation in the  left cerebral hemispheres is preserved. Ventricles are proportionate  to the cerebral sulci. The basal cisterns are clear.    The bony calvaria and the bones of the skull base are normal. The  visualized portions of the paranasal sinuses and mastoid air cells are  clear.       Impression    Impression:  Asymmetric loss of gray-white differentiation of the right frontal  lobe, basal ganglia, and occipital lobe which likely represents  hypoxic ischemic injury. Recommend obtaining MRI to further  characterize infarct, if possible.     [Urgent Result: Hypoxic ischemic injury of the right cerebrum]    Finding was identified on 10/2/2020 3:19 PM.      was contacted by Dr. Marta Palafox at 10/2/2020 4:08 PM and  verbalized understanding of the urgent finding.    CT Chest Abdomen Pelvis w/o Contrast   Result Value    Radiologist flags High ETT and Shock Bowel (Urgent)    Narrative    EXAMINATION: CT CHEST ABDOMEN PELVIS W/O CONTRAST, 10/2/2020 3:22 PM    TECHNIQUE:  Helical CT images from the thoracic inlet through the  symphysis pubis were obtained without IV contrast.     COMPARISON: None available.    HISTORY: s/p ECMO    FINDINGS:  Lines/tubes: Endotracheal tube within the high thoracic trachea.  Enteric tube within the stomach. Left femoral approach venous ECMO  cannula with the tip in the mid SVC. Left femoral intra-aortic balloon  pump with the superior metallic marker approximately 1.3 cm cephalad  to the thu. Right femoral artery ECMO cannula within the right  common iliac vein. Femoral venous PICC with the tip at the inferior  cavoatrial junction. Right inguinal subcutis hematoma measuring 0.9 x  5.6 cm. Urinary catheter with the balloon inflated within the urinary  bladder.    CHEST:  LUNGS: The  trachea and central airways are patent. Small bilateral  pleural effusions with consolidative opacities in the dependent lung  fields. Diffuse nodular groundglass opacities with smooth interlobular  septal thickening.     MEDIASTINUM: The visualized thyroid gland is unremarkable. The heart  size is within normal limits. Small pericardial effusion. Metallic  stent within the left anterior distending coronary artery. Descending  aorta and main pulmonary artery diameters are within normal limits.  Normal appearance and configuration of the great vessels off the  aortic arch. No suspicious mediastinal, hilar, or axillary lymph  nodes.    ABDOMEN/PELVIS:  LIVER: No focal hepatic mass.    BILIARY: The gallbladder is distended. No gallbladder wall thickening  or hyperdense stone. No intrahepatic or extrahepatic biliary ductal  dilatation.    PANCREAS: Within normal limits.    SPLEEN: Within normal limits.    ADRENAL GLANDS: 0.9 cm hypodense nodule within the right adrenal gland  consistent with a benign right adrenal adenoma. The left adrenal gland  is unremarkable.    URINARY TRACT: Hyperdense contrast within the collecting systems and  urinary bladder consistent with earlier same day coronary angiography.    REPRODUCTIVE ORGANS: Within normal limits.    STOMACH: Enteric tube within the gastric lumen with a moderate amount  of intragastric air and fluid.    BOWEL: Multiple fluid-containing loops of large and small bowel. The  small bowel appears relatively featureless which is a nonspecific  finding but can be seen in patient's with shock bowel. No pneumatosis,  portal venous gas, or pneumoperitoneum. The appendix is visualized and  is unremarkable.    PERITONEUM/FLUID: No free fluid. Small to moderate size right  retroperitoneal hematoma near the renal collecting system and just  inferior to the right kidney.    VESSELS: No aneurysmal dilatation of the abdominal aorta.    LYMPH NODES: No lymphadenopathy.    BONES/SOFT  TISSUES: No aggressive osseous lesions.      Impression    IMPRESSION:   1. Endotracheal tube projecting over the high thoracic trachea,  consider advancement approximately 2 cm.  2. Relatively featureless-appearing bowel which is a nonspecific  finding but can be seen in patient's with shock bowel. No pneumatosis,  portal venous gas, or pneumoperitoneum.  3. Small bilateral pleural effusions with consolidative opacities in  the dependent lung fields with diffuse scattered nodular groundglass  opacities and smooth interlobular septal thickening. Overall findings  suggestive of pulmonary edema underlying infection is not entirely  excluded.  4. Small right inguinal subcutaneous hematoma. Small to moderate sized  right retroperitoneal hematoma.  5. Benign right adrenal adenoma.  6. Additional support devices as detailed above.    [Urgent Result: High ETT and Shock Bowel]    Finding was identified on 10/2/2020 3:39 PM.     Dr. Conway  was contacted by Dr. Cook at 10/2/2020 4:00 PM and  verbalized understanding of the urgent finding.     I have personally reviewed the examination and initial interpretation  and I agree with the findings.    JEZ ENG MD   XR Chest Port 1 View    Impression    IMPRESSION:  1. Endotracheal tube projecting over the high thoracic trachea.  Consider advancement 2-3 cm.  2. Inferior approach a small cannula with the tip overlying the mid  SVC.  3. Intra-aortic balloon pump marker 2 cm cephalad to the thu.  4. Diffuse mixed interstitial and airspace opacities likely  representing pulmonary edema. Infection is not entirely excluded.              Assessment and Plan:   Sachi Bolanos is a 89 year old adult who was admitted on 10/2/2020.    Neurology: Intubated, sedated, paralyzed. Cool to 34 degrees.  --continue versed, fentanyl, and cis as needed to maintain paralysis   - RASS goal -3 to -4   CT head ordered   Cardiovascular / Hemodynamics: Refractory VF arrest.   THELMA 3.5 x  38 mm to pLAD.  Peripheral V-A ECMO inserted in RFA/RFV. Initial LA 19.   TTE: complete ordered  --wean pressors/inotropes as able  -- complete echo ordered  --continue ASA 81mg and ticagrelor 90mg BID  --- Cangrelor for 6 hours  --hold temp at 34  --ACT goal 180-200  --hold lipitor for now given likely hepatic injury during arrest  --hold ACE/ARB for now given likely reduced renal fxn after arrest  --holding beta blocker given shock    Pulmonary: Vent Settings: CMV 12/400/7/50%  ETT in stable position  Now weaning vent requirements.  CXR: Lines in stable position.  --wean vent as able  -- target pCO2 40-50  --daily CXR  - CT C/A/P on admission  --Q2h ABGs for now  --consider scheduled duonebs if signs of lung dz, currently PRN     GI and Nutrition: No known medical hx.  --monitor BID LFTs  --NPO while cooled   - nutrition consult pending feeding tube placement once he is warmed   --bowel regimen - on hold for now due to hypothermia  --GI Prophylaxis: PPI    Renal, Fluid and Electrolytes: Cr Pending  --monitor urine output  --maintain K>3 and Mg>2    Infectious Disease: No signs of infection. Leukocytosis c/w arrest. Blood cultures collected.   --vancomycin/zosyn x5 days for presumed aspiration pneumonia  --daily blood cultures  --monitor for signs of infection given cooling, lines, and leukocytosis   Hematology and Oncology: Receiving heparin for ECMO and ASA/ticagrelor for THELMA.  --cryo PRN fibrinogen < 100; FFP for INR >2  --Transfuse for Hgb<7, platelets <50k  --heparin gtt for ECMO with ACT goal 180-200  --US LE w/ arterial duplex per ECMO protocol   --DVT PPX: Heparin as above   Endocrinology: No known medical history. BG elevated.  --insulin gtt  --f/u HgbA1c    Lines: R femoral arterial and L venous ECMO cannulae October 2, 2020  L femoral arterial line October 2, 2020  R femoral vein cooling catheter October 2, 2020  R radial arterial line October 2, 2020  ETT October 2, 2020  Babcock catheter October 2,  2020  OG tube October 2, 2020  Restraint: as needed    Current lines are required for patient management       Family update by me today: Yes     Code Status:    The pt was discussed and evaluated with Dr. Royal Sarabia, attending physician, who agrees with the assessment and plan above.     Mykel Conway MD, MS  [facility]  [phone]

## 2020-10-02 NOTE — PROGRESS NOTES
ECMO Shift Summary:1231-7893      ECMO Equipment:  Console serial number: [id number]  Circuit Lot number: [id number]  Oxygenator Lot number: [id number]      Patient remains on [facility] ECMO, all equipment is functioning and alarms are appropriately set. RPM's 3000 with flow range 2.7 L/min. Sweep gas is at 5 LPM and FiO2 70%. Circuit remains free of air, clot and fibrin. Cannulas are secure with no bleeding from site. Extremities are cool. Suctioned ETT for bloody secretions    Vent settings:  Ventilation Mode: CMV/AC  (Continuous Mandatory Ventilation/ Assist Control)  FiO2 (%): 50 %  Rate Set (breaths/minute): 16 breaths/min  Tidal Volume Set (mL): 500 mL  PEEP (cm H2O): 5 cmH2O  Oxygen Concentration (%): 50 %  .    Heparin is running at 0 u/kg/hr, ACT range 180-200.    Urine output is 1300 mls over the last 2hours, blood loss was moderate via ETT. Product given included 750 5% albumin, 1 liter of LR.  FFP ordered for fibrinogen replacement.      Intake/Output Summary (Last 24 hours) at 10/2/2020 1712  Last data filed at 10/2/2020 1600  Gross per 24 hour   Intake 3020 ml   Output 1300 ml   Net 1720 ml       ECHO:  No results found for this or any previous visit.No results found for this or any previous visit.    CXR:  Recent Results (from the past 24 hour(s))   CT Head w/o Contrast   Result Value    Radiologist flags Hypoxic ischemic injury of the right cerebrum (Urgent)    Narrative    CT HEAD W/O CONTRAST 10/2/2020 3:15 PM    History: s/p ECMO   ICD-10: Status post ECMO    Comparison: None    Technique: Using multidetector thin collimation helical acquisition  technique, axial, coronal and sagittal CT images from the skull base  to the vertex were obtained without intravenous contrast.    Findings: There is no intracranial hemorrhage, mass effect, or midline  shift. Asymmetric loss of gray-white differentiation of the right  frontal lobe, basal ganglia, occipital lobe likely secondary to  hypoxic ischemic injury. .  Gray/white matter differentiation in the  left cerebral hemispheres is preserved. Ventricles are proportionate  to the cerebral sulci. The basal cisterns are clear.    The bony calvaria and the bones of the skull base are normal. The  visualized portions of the paranasal sinuses and mastoid air cells are  clear.       Impression    Impression:  Asymmetric loss of gray-white differentiation of the right frontal  lobe, basal ganglia, and occipital lobe which likely represents  hypoxic ischemic injury. Recommend obtaining MRI to further  characterize infarct, if possible.     [Urgent Result: Hypoxic ischemic injury of the right cerebrum]    Finding was identified on 10/2/2020 3:19 PM.      was contacted by Dr. Marta Palafox at 10/2/2020 4:08 PM and  verbalized understanding of the urgent finding.    CT Chest Abdomen Pelvis w/o Contrast   Result Value    Radiologist flags High ETT and Shock Bowel (Urgent)    Narrative    EXAMINATION: CT CHEST ABDOMEN PELVIS W/O CONTRAST, 10/2/2020 3:22 PM    TECHNIQUE:  Helical CT images from the thoracic inlet through the  symphysis pubis were obtained without IV contrast.     COMPARISON: None available.    HISTORY: s/p ECMO    FINDINGS:  Lines/tubes: Endotracheal tube within the high thoracic trachea.  Enteric tube within the stomach. Left femoral approach venous ECMO  cannula with the tip in the mid SVC. Left femoral intra-aortic balloon  pump with the superior metallic marker approximately 1.3 cm cephalad  to the thu. Right femoral artery ECMO cannula within the right  common iliac vein. Femoral venous PICC with the tip at the inferior  cavoatrial junction. Right inguinal subcutis hematoma measuring 0.9 x  5.6 cm. Urinary catheter with the balloon inflated within the urinary  bladder.    CHEST:  LUNGS: The trachea and central airways are patent. Small bilateral  pleural effusions with consolidative opacities in the dependent lung  fields. Diffuse nodular groundglass  opacities with smooth interlobular  septal thickening.     MEDIASTINUM: The visualized thyroid gland is unremarkable. The heart  size is within normal limits. Small pericardial effusion. Metallic  stent within the left anterior distending coronary artery. Descending  aorta and main pulmonary artery diameters are within normal limits.  Normal appearance and configuration of the great vessels off the  aortic arch. No suspicious mediastinal, hilar, or axillary lymph  nodes.    ABDOMEN/PELVIS:  LIVER: No focal hepatic mass.    BILIARY: The gallbladder is distended. No gallbladder wall thickening  or hyperdense stone. No intrahepatic or extrahepatic biliary ductal  dilatation.    PANCREAS: Within normal limits.    SPLEEN: Within normal limits.    ADRENAL GLANDS: 0.9 cm hypodense nodule within the right adrenal gland  consistent with a benign right adrenal adenoma. The left adrenal gland  is unremarkable.    URINARY TRACT: Hyperdense contrast within the collecting systems and  urinary bladder consistent with earlier same day coronary angiography.    REPRODUCTIVE ORGANS: Within normal limits.    STOMACH: Enteric tube within the gastric lumen with a moderate amount  of intragastric air and fluid.    BOWEL: Multiple fluid-containing loops of large and small bowel. The  small bowel appears relatively featureless which is a nonspecific  finding but can be seen in patient's with shock bowel. No pneumatosis,  portal venous gas, or pneumoperitoneum. The appendix is visualized and  is unremarkable.    PERITONEUM/FLUID: No free fluid. Small to moderate size right  retroperitoneal hematoma near the renal collecting system and just  inferior to the right kidney.    VESSELS: No aneurysmal dilatation of the abdominal aorta.    LYMPH NODES: No lymphadenopathy.    BONES/SOFT TISSUES: No aggressive osseous lesions.      Impression    IMPRESSION:   1. Endotracheal tube projecting over the high thoracic trachea,  consider advancement  approximately 2 cm.  2. Relatively featureless-appearing bowel which is a nonspecific  finding but can be seen in patient's with shock bowel. No pneumatosis,  portal venous gas, or pneumoperitoneum.  3. Small bilateral pleural effusions with consolidative opacities in  the dependent lung fields with diffuse scattered nodular groundglass  opacities and smooth interlobular septal thickening. Overall findings  suggestive of pulmonary edema underlying infection is not entirely  excluded.  4. Small right inguinal subcutaneous hematoma. Small to moderate sized  right retroperitoneal hematoma.  5. Benign right adrenal adenoma.  6. Additional support devices as detailed above.    [Urgent Result: High ETT and Shock Bowel]    Finding was identified on 10/2/2020 3:39 PM.     Dr. Conway  was contacted by Dr. Cook at 10/2/2020 4:00 PM and  verbalized understanding of the urgent finding.     I have personally reviewed the examination and initial interpretation  and I agree with the findings.    JEZ ENG MD       Labs:  Recent Labs   Lab 10/02/20  1331 10/02/20  1242 10/02/20  1235 10/02/20  1233   PH 7.25* 7.03* <6.75* <6.75*   PCO2 23* 42 52* 52*   PO2 >600* 274* 118* 118*   HCO3 13* 11* Unable to Calculate Unable to Calculate   O2PER 100 100 100 100       Lab Results   Component Value Date    HGB 9.7 (L) 10/02/2020    PHGB 100 (H) 10/02/2020     10/02/2020    INR 3.53 (H) 10/02/2020    AXA 0.53 10/02/2020         Plan is continue [facility] support.  Wean sweep keeping paco2 in the 40-50 range unless pH is > 7.1      Ajay Eubanks, RT  ECMO Specialist  10/2/2020 5:12 PM

## 2020-10-02 NOTE — Clinical Note
The first balloon was inserted into the left anterior descending and proximal left anterior descending.Max pressure = 12 skyler. Total duration = 10 seconds.

## 2020-10-02 NOTE — PROGRESS NOTES
[facility], [place]  Procedure Note          Intra-Aortic Balloon Pump Insertion:       Sachi Bolanos  MRN# [id number]   October 2, 2020 Indication: Cardiogenic shock           Procedure performed: October 2, 2020   Location: Cardiac Cath Lab   Catheter size: 50   Inserted: 6 inch introducer sheath   Catheter placed: Left femoral artery   Complications:: None   Assist initiated: 1:1 ratio   Percent augmentation: 100   Timing adjusted: Adjusted to achieve optimal assist   Verification of position: Fluoroscopy   Comments: None      Recorded by Edith Orellana

## 2020-10-03 PROBLEM — N17.9 ACUTE KIDNEY FAILURE, UNSPECIFIED (H): Status: ACTIVE | Noted: 2020-01-01

## 2020-10-03 NOTE — PROGRESS NOTES
ECMO Shift Summary: [id number]      ECMO Equipment:  Console serial number: [id number]  Circuit Lot number: [id number]  Oxygenator Lot number: [id number]      Patient remains on VA ECMO, all equipment is functioning and alarms are appropriately set. RPM's 3150 with flow range 3.08-3.14 L/min. Sweep gas is at 1 LPM and FiO2 60%. Circuit remains free of air, clot and fibrin. Cannulas are secure with bleeding from right groin site. Extremities are cool and pale.     Significant Shift Events:   - MD and NP attempted to suture right groin with purse string suture in hopes that the bleeding would stop. Ultimately a fem stop needed to be placed at 1050. The bleeding has stopped. Fem stop remains on  - Heparin was restarted at 0930 per attending. Keep -180.  - Keep flows at 3.5 per Dr. Rowland     Vent settings:  Ventilation Mode: CMV/AC  (Continuous Mandatory Ventilation/ Assist Control)  FiO2 (%): 60 %  Rate Set (breaths/minute): 12 breaths/min  Tidal Volume Set (mL): 400 mL  PEEP (cm H2O): 7 cmH2O  Oxygen Concentration (%): 60 %  Resp: 16  .    Heparin is running at 11.8 u/kg/hr, ACT range 141-175.    Urine output is adequate.  Moderate blood loss at the start of the shift. Scant now with fem stop.   Product given 1 cryo and 2 cells    Intake/Output Summary (Last 24 hours) at 10/3/2020 1434  Last data filed at 10/3/2020 1400  Gross per 24 hour   Intake 7406.14 ml   Output 2716 ml   Net 4690.14 ml       ECHO:  No results found for this or any previous visit.No results found for this or any previous visit.    CXR:  Recent Results (from the past 24 hour(s))   Cardiac Catheterization    Narrative    --Refractory VF cardiac arrest.  --Successful insertion of peripheral V-A Extracorporeal Membrane   Oxygenation - in the right femoral artery and and left femoral vein.  --Successful insertion of a distal antegrade flow cannula in the right   SFA.  --Single vessel coronary artery disease without left main involvement.    --Successful deployment of a 3.5x38mm Synergy drug eluting stent to the   proximal  LAD.  --Successful insertion of a Zoll Quattro intravenous cooling catheter in   the right femoral vein.  --Successful insertion of intra-aortic balloon pump in the left common   femoral artery.  --Successful insertion of right radial arterial line for hemodynamic   monitoring.     CT Head w/o Contrast   Result Value    Radiologist flags Possibly hypoxic ischemic injury (Urgent)    Narrative    CT HEAD W/O CONTRAST 10/2/2020 3:15 PM    History: s/p ECMO   ICD-10: Status post ECMO    Comparison: None    Technique: Using multidetector thin collimation helical acquisition  technique, axial, coronal and sagittal CT images from the skull base  to the vertex were obtained without intravenous contrast.    Findings: There is no intracranial hemorrhage, mass effect, or midline  shift. Asymmetric loss of gray-white differentiation of the right  frontal lobe, basal ganglia, occipital lobe, could be secondary to  hypoxic ischemic injury.  Gray/white matter differentiation in the  left cerebral hemispheres is mostly preserved. Ventricles are  proportionate to the cerebral sulci. The basal cisterns are clear.    The bony calvaria and the bones of the skull base are normal. The  visualized portions of the paranasal sinuses and mastoid air cells are  clear.       Impression    Impression:  Asymmetric loss of gray-white differentiation of the right frontal  lobe, basal ganglia, and occipital lobe which may represent hypoxic  ischemic injury. Recommend obtaining MRI to further characterize, if  possible.     [Urgent Result: Possibly hypoxic ischemic injury ]    Finding was identified on 10/2/2020 3:19 PM.      was contacted by Dr. Marta Palafox at 10/2/2020 4:08 PM and  verbalized understanding of the urgent finding.     I have personally reviewed the examination and initial interpretation  and I agree with the findings.    MICHELLE MI MD    CT Chest Abdomen Pelvis w/o Contrast   Result Value    Radiologist flags High ETT and Shock Bowel (Urgent)    Narrative    EXAMINATION: CT CHEST ABDOMEN PELVIS W/O CONTRAST, 10/2/2020 3:22 PM    TECHNIQUE:  Helical CT images from the thoracic inlet through the  symphysis pubis were obtained without IV contrast.     COMPARISON: None available.    HISTORY: s/p ECMO    FINDINGS:  Lines/tubes: Endotracheal tube within the high thoracic trachea.  Enteric tube within the stomach. Left femoral approach venous ECMO  cannula with the tip in the mid SVC. Left femoral intra-aortic balloon  pump with the superior metallic marker approximately 1.3 cm cephalad  to the thu. Right femoral artery ECMO cannula within the right  common iliac vein. Femoral venous PICC with the tip at the inferior  cavoatrial junction. Right inguinal subcutis hematoma measuring 0.9 x  5.6 cm. Urinary catheter with the balloon inflated within the urinary  bladder.    CHEST:  LUNGS: The trachea and central airways are patent. Small bilateral  pleural effusions with consolidative opacities in the dependent lung  fields. Diffuse nodular groundglass opacities with smooth interlobular  septal thickening.     MEDIASTINUM: The visualized thyroid gland is unremarkable. The heart  size is within normal limits. Small pericardial effusion. Metallic  stent within the left anterior distending coronary artery. Descending  aorta and main pulmonary artery diameters are within normal limits.  Normal appearance and configuration of the great vessels off the  aortic arch. No suspicious mediastinal, hilar, or axillary lymph  nodes.    ABDOMEN/PELVIS:  LIVER: No focal hepatic mass.    BILIARY: The gallbladder is distended. No gallbladder wall thickening  or hyperdense stone. No intrahepatic or extrahepatic biliary ductal  dilatation.    PANCREAS: Within normal limits.    SPLEEN: Within normal limits.    ADRENAL GLANDS: 0.9 cm hypodense nodule within the right adrenal  gland  consistent with a benign right adrenal adenoma. The left adrenal gland  is unremarkable.    URINARY TRACT: Hyperdense contrast within the collecting systems and  urinary bladder consistent with earlier same day coronary angiography.    REPRODUCTIVE ORGANS: Within normal limits.    STOMACH: Enteric tube within the gastric lumen with a moderate amount  of intragastric air and fluid.    BOWEL: Multiple fluid-containing loops of large and small bowel. The  small bowel appears relatively featureless which is a nonspecific  finding but can be seen in patient's with shock bowel. No pneumatosis,  portal venous gas, or pneumoperitoneum. The appendix is visualized and  is unremarkable.    PERITONEUM/FLUID: No free fluid. Small to moderate size right  retroperitoneal hematoma near the renal collecting system and just  inferior to the right kidney.    VESSELS: No aneurysmal dilatation of the abdominal aorta.    LYMPH NODES: No lymphadenopathy.    BONES/SOFT TISSUES: No aggressive osseous lesions.      Impression    IMPRESSION:   1. Endotracheal tube projecting over the high thoracic trachea,  consider advancement approximately 2 cm.  2. Relatively featureless-appearing bowel which is a nonspecific  finding but can be seen in patient's with shock bowel. No pneumatosis,  portal venous gas, or pneumoperitoneum.  3. Small bilateral pleural effusions with consolidative opacities in  the dependent lung fields with diffuse scattered nodular groundglass  opacities and smooth interlobular septal thickening. Overall findings  suggestive of pulmonary edema underlying infection is not entirely  excluded.  4. Small right inguinal subcutaneous hematoma. Small to moderate sized  right retroperitoneal hematoma.  5. Benign right adrenal adenoma.  6. Additional support devices as detailed above.    [Urgent Result: High ETT and Shock Bowel]    Finding was identified on 10/2/2020 3:39 PM.     Dr. Conway  was contacted by Dr. Cook at  10/2/2020 4:00 PM and  verbalized understanding of the urgent finding.     I have personally reviewed the examination and initial interpretation  and I agree with the findings.    JEZ ENG MD   XR Chest Port 1 View    Narrative    EXAM: XR CHEST PORT 1 VW  10/2/2020 5:16 PM     HISTORY:  Check endotracheal tube placement and ECLS cannula  placement. DO NOT log-roll patient.  Place film under patient using  patient safety handling process.       COMPARISON:  Chest and pelvis CT 10/2/2020.    FINDINGS: AP radiograph of the chest. Endotracheal tube projecting  over the high thoracic trachea 6.4 cm cephalad to the thu.  Recommend advancement approximately 2 to 3 cm. Intra-aortic balloon  pump metallic marker projecting 2 cm cephalad to the thu. Inferior  approach cannula with the tip projected over the mid SVC. Inferior  approach PICC with the tip overlying the inferior cavoatrial junction.  Partial visualization of enteric tube coursing inferior to the  diaphragm with the tip outside the field-of-view. The  cardiomediastinal silhouettes are within normal limits. Diffuse mixed  interstitial and airspace opacities in the right greater than left  lungs. No pneumothorax or large pleural effusion.      Impression    IMPRESSION:  1. Endotracheal tube projecting over the high thoracic trachea.  Consider advancement 2-3 cm.  2. Inferior approach a small cannula with the tip overlying the mid  SVC.  3. Intra-aortic balloon pump marker 2 cm cephalad to the thu.  4. Diffuse mixed interstitial and airspace opacities likely  representing pulmonary edema. Infection is not entirely excluded.    I have personally reviewed the examination and initial interpretation  and I agree with the findings.    DEMETRIUS HESTER MD   XR Chest Port 1 View    Narrative    XR CHEST PORT 1 VW  10/3/2020 5:11 AM      HISTORY: Check endotracheal tube placement and ECLS cannula placement.  DO NOT log-roll patient.  Place film under patient  using patient  safety handling process.  Cardiac arrest.    COMPARISON: Chest x-ray 10/2/2020    TECHNIQUE: Supine frontal view of the chest    FINDINGS: Bilateral mixed interstitial and patchy airspace opacities  with worsening consolidation of the left mid to lower lung zone. No  pneumothorax or significant pleural effusion. Cardiac mediastinal  silhouette is within normal limits. Trachea is midline. Upper abdomen  is unremarkable. No suspicious osseous lesion.    Endotracheal tube tip projects over the midthoracic trachea.  Intra-aortic balloon pump measures 2.3 cm superior to thu. Inferior  approach ECMO cannula with tip projecting over the mid SVC. Inferior  approach CVC with tip projecting over the inferior cavoatrial  junction. NG/OG tube projecting over the stomach.      Impression    IMPRESSION:   1. Bilateral mixed interstitial and patchy airspace opacities with  worsening consolidation of the left mid to lower lung zone. ARDS  versus pulmonary edema or infection.  2. Stable support devices.    I have personally reviewed the examination and initial interpretation  and I agree with the findings.    DEMETRIUS HESTER MD   Echocardiogram Complete    Narrative    [id number]  [id number]  [id number]  [id number]^NATALI^ARTHUR           [facility],[place]  Echocardiography Laboratory  [address] [place] [id number]     Name: HOMERO RAMÍREZ  MRN: [id number]  : 1898  Study Date: 10/03/2020 11:34 AM  Age: 89 yrs  Gender: Female  Patient Location: [facility]  Reason For Study: Cardiac Arrest  Ordering Physician: ARTHUR HURST  Performed By: SHAY Turcios     BP: 107/45 mmHg  _____________________________________________________________________________  __        Procedure  Complete Portable Echo Adult.  _____________________________________________________________________________  __        Interpretation Summary  VA ECMO.  Severely (EF 10-20%) reduced left ventricular  function is present. Large area  of anteroapical akinesis.  Right ventricular function, chamber size, wall motion, and thickness are  normal.  No pericardial effusion is present.  There is no prior study for direct comparison.  _____________________________________________________________________________  __        Left Ventricle  Left ventricular wall thickness is normal. Left ventricular size is normal.  Severely (EF 10-20%) reduced left ventricular function is present. Left  ventricular diastolic function is not assessable. Apical wall akinesis is  present. Anterior wall akinesis is present.     Right Ventricle  Right ventricular function, chamber size, wall motion, and thickness are  normal.     Atria  Both atria appear normal.     Mitral Valve  The mitral valve is normal.     Aortic Valve  Aortic valve is normal in structure and function.        Tricuspid Valve  The tricuspid valve is normal. The peak velocity of the tricuspid regurgitant  jet is not obtainable.     Pulmonic Valve  The pulmonic valve is normal.     Vessels  The aorta root is normal. The inferior vena cava is normal.     Pericardium  No pericardial effusion is present.     Compared to Previous Study  There is no prior study for direct comparison.     _____________________________________________________________________________  __  MMode/2D Measurements & Calculations  IVSd: 1.1 cm  LVIDd: 3.8 cm  LVIDs: 3.2 cm  LVPWd: 0.90 cm  FS: 15.1 %     LV mass(C)d: 113.1 grams     EF(MOD-bp): 13.2 %  RWT: 0.47        Doppler Measurements & Calculations  PA V2 max: 66.9 cm/sec  PA max P.8 mmHg     _____________________________________________________________________________  __           Report approved by: Alex Vega 10/03/2020 02:10 PM          Labs:  [facility] Labs   Lab 10/03/20  1349 10/03/20  1218 10/03/20  1041 10/03/20  0831   PH 7.34* 7.33* 7.31* 7.31*   PCO2 39 37 38 39   PO2 88 96 78* 79*   HCO3 21 20* 19* 20*   O2PER 60 60 60 60        Lab Results   Component Value Date    HGB 9.4 (L) 10/03/2020    PHGB 30 (H) 10/03/2020    PLT 79 (L) 10/03/2020    FIBR 148 (L) 10/03/2020    INR 1.53 (H) 10/03/2020    PTT 50 (H) 10/03/2020    DD >20.0 (H) 10/03/2020    AXA <0.10 10/03/2020    ANTCH 53 (L) 10/03/2020         Plan is to continue VA ECMO.  Monitor bleeding.  Rewarming started.       Gretel Londono RN  ECMO Specialist  10/3/2020 2:34 PM

## 2020-10-03 NOTE — PROGRESS NOTES
ECMO Attending Progress Note  10/3/2020    Sachi Bolanos is a 44 year old female who was cannulated for ECMO 10/2/2020 due to refractory VF arrest    Cannulation Site:  15 Fr in the R femoral artery  25 Fr in the L femoral vein    Interval events: improved hemodynamic stability, bleeding at the groin site requiring blood transfusion and pressure, LA improving    Physical Exam:  Temp:  [90.7  F (32.6  C)-93.4  F (34.1  C)] 93.4  F (34.1  C)  Pulse:  [] 61  Resp:  [12-37] 12  MAP:  [63 mmHg-276 mmHg] 77 mmHg  Arterial Line BP: ()/() 107/43  FiO2 (%):  [50 %-60 %] 60 %  SpO2:  [87 %-100 %] 100 %    Intake/Output Summary (Last 24 hours) at 10/3/2020 1622  Last data filed at 10/3/2020 1600  Gross per 24 hour   Intake 6559.14 ml   Output 1631 ml   Net 4928.14 ml    Ventilation Mode: CMV/AC  (Continuous Mandatory Ventilation/ Assist Control)  FiO2 (%): 60 %  Rate Set (breaths/minute): 12 breaths/min  Tidal Volume Set (mL): 400 mL  PEEP (cm H2O): 7 cmH2O  Oxygen Concentration (%): 60 %  Resp: 12       Labs:  Recent Labs   Lab 10/03/20  1349 10/03/20  1218 10/03/20  1041 10/03/20  0831   PH 7.34* 7.33* 7.31* 7.31*   PCO2 39 37 38 39   PO2 88 96 78* 79*   HCO3 21 20* 19* 20*   O2PER 60 60 60 60      Recent Labs   Lab 10/03/20  1041 10/03/20  0339 10/02/20  2154 10/02/20  1556   WBC 13.6* 13.0* 15.2* 19.7*   HGB 9.4* 7.2* 7.5* 9.7*     Creatinine   Date Value Ref Range Status   10/03/2020 1.70 (H) 0.52 - 1.04 mg/dL Final   10/03/2020 1.47 (H) 0.52 - 1.04 mg/dL Final   10/02/2020 1.27 (H) 0.52 - 1.04 mg/dL Final   10/02/2020 0.86 0.52 - 1.04 mg/dL Final     Blood Flow (Circuit) LPM: 3.08 LPM  Gas Flow  LPM: 1 LPM  Gas FiO2   %: 60 %  ACT  (seconds): 167 seconds  Blood Temp  (degrees C): 34.2 C  Pulse Oximetry  (SpO2%): 100 %  Arterial Pressure  mmH mmHg    ECMO Issues including assessments and plan on DOS 10/3/2020:  Neuro: Sedated for mechanical ventilation and ECMO.  No acute distress.  NIRS  stable b/l  RASS goal: -3  CV: Cardiogenic shock.  Hemodynamically stable on no pressors  Pulm: Keep vent settings at rest settings as above.  FEN/Renal: Electrolytes stable w/ replacement protocols in place, Cr increasing slightly, UOP stable  Heme: ACT goal: 160-180, Hemoglobin stable after transfusion.  Minimal oozing around the ECMO cannulas now with pressure held and purse string suture in place.  ID: Receiving empiric antibiotics  Cannulae: Position is acceptable on exam and the available imaging.  Distal perfusion cannula is in place and patent.  Extremities are well-perfused.     I have personally reviewed the ECMO flows, oxygenation and CO2 clearance, anticoagulation, and cannula position.  I have also personally assessed the patient's systemic response with hemodynamics, oxygenation, ventilation, and bleeding.       The patient requires continued ECMO support and management in the ICU.  I have discussed patient care and treatment plan with the primary team.      Luis Rowland MD, PhD  Interventional/Critical Care Cardiology  [phone]    October 3, 2020

## 2020-10-03 NOTE — PROVIDER NOTIFICATION
D: Pt bleeding from right arterial ECMO cannulation site. Pulses doppler-able, hemoglobin 7.2, INR 1.81, Fibrinogen 148, Platelets 123.    A: Provider notified. Provider to bedside to assess site. ECMO tech and writer changed dressing and added quick clot.    R: Sandbag applied, 2 units of FFP and 1 unit of Cryo ordered. Continue to monitor post-infusion of FFP and Cryo.

## 2020-10-03 NOTE — PROGRESS NOTES
ECMO Shift Summary:    ECMO Equipment:  Console serial number: [id number]  Circuit Lot number: [id number]  Oxygenator Lot number: [id number]    Patient remains on VA ECMO, all equipment is functioning and alarms are appropriately set. RPM's 3150 with flow range 3.15-3.83 L/min. Sweep gas is at 1 LPM and FiO2 96%. Circuit remains free of air, clot and fibrin. Cannulas are secure a large amount of oozing from the sites, mostly from the arterial cannula site. Extremities are cool to touch as the patient is being therapeutically cooled. Suctioned ETT for a small to moderate amount of thin, pink, frothy secretions.    Significant Shift Events:     The patient was shivering at the beginning of the shift so sedation was increased. All pressors were eventually turned off overnight. Lactic acid trended down as the shift progressed. Sweep decreased to get pCO2 to approximately 40 per Dr. Rowland and Dr. Redd. The patient continues to have a metabolic acidosis and the physicians are aware of this.    The patient has a large amount of oozing from the arterial cannula site. There was some oozing before the heparin was started but the bleeding greatly increased after heparin was started. Overnight fellow came to the bedside to observe the oozing when the dressing was removed. There is less oozing as you provide an inward pressure to the cannulas, which shifts the cannulas slightly inwards. The dressing was changed and Quickclot was applied. Plan was to keep the cannulas in current position overnight, ACT goal decreased to 160-180, gave 2 units of FFP and one unit of cryo, and a sandbag was applied to the site. Hemoglobin has decreased slightly from the bleeding but not as much as expected given the observation of the bloody chux.    Total blood product given overnight included: 3 units of RBCs and 5 units of FFP.    Vent settings:  Ventilation Mode: CMV/AC  (Continuous Mandatory Ventilation/ Assist Control)  FiO2 (%): (S) 60 %  Rate  Set (breaths/minute): 12 breaths/min  Tidal Volume Set (mL): 400 mL  PEEP (cm H2O): 7 cmH2O  Oxygen Concentration (%): 60 %  Resp: 15  .    Heparin is off, ACT range 145-179.    Urine output is about 50-60 ml/hr, blood loss was a large amount from oozing mostly at the arterial cannulation site. Product given included 3 units of RBCs and 5 units of FFP.      Intake/Output Summary (Last 24 hours) at 10/3/2020 0625  Last data filed at 10/3/2020 0600  Gross per 24 hour   Intake 8765.54 ml   Output 2211 ml   Net 6554.54 ml       ECHO:  No results found for this or any previous visit.No results found for this or any previous visit.    CXR:  Recent Results (from the past 24 hour(s))   Cardiac Catheterization    Narrative    --Refractory VF cardiac arrest.  --Successful insertion of peripheral V-A Extracorporeal Membrane   Oxygenation - in the right femoral artery and and left femoral vein.  --Successful insertion of a distal antegrade flow cannula in the right   SFA.  --Single vessel coronary artery disease without left main involvement.   --Successful deployment of a 3.5x38mm Synergy drug eluting stent to the   proximal  LAD.  --Successful insertion of a Zoll Quattro intravenous cooling catheter in   the right femoral vein.  --Successful insertion of intra-aortic balloon pump in the left common   femoral artery.  --Successful insertion of right radial arterial line for hemodynamic   monitoring.     CT Head w/o Contrast   Result Value    Radiologist flags Possibly hypoxic ischemic injury (Urgent)    Narrative    CT HEAD W/O CONTRAST 10/2/2020 3:15 PM    History: s/p ECMO   ICD-10: Status post ECMO    Comparison: None    Technique: Using multidetector thin collimation helical acquisition  technique, axial, coronal and sagittal CT images from the skull base  to the vertex were obtained without intravenous contrast.    Findings: There is no intracranial hemorrhage, mass effect, or midline  shift. Asymmetric loss of gray-white  differentiation of the right  frontal lobe, basal ganglia, occipital lobe, could be secondary to  hypoxic ischemic injury.  Gray/white matter differentiation in the  left cerebral hemispheres is mostly preserved. Ventricles are  proportionate to the cerebral sulci. The basal cisterns are clear.    The bony calvaria and the bones of the skull base are normal. The  visualized portions of the paranasal sinuses and mastoid air cells are  clear.       Impression    Impression:  Asymmetric loss of gray-white differentiation of the right frontal  lobe, basal ganglia, and occipital lobe which may represent hypoxic  ischemic injury. Recommend obtaining MRI to further characterize, if  possible.     [Urgent Result: Possibly hypoxic ischemic injury ]    Finding was identified on 10/2/2020 3:19 PM.      was contacted by Dr. Marta Palafox at 10/2/2020 4:08 PM and  verbalized understanding of the urgent finding.     I have personally reviewed the examination and initial interpretation  and I agree with the findings.    MICHELLE MI MD   CT Chest Abdomen Pelvis w/o Contrast   Result Value    Radiologist flags High ETT and Shock Bowel (Urgent)    Narrative    EXAMINATION: CT CHEST ABDOMEN PELVIS W/O CONTRAST, 10/2/2020 3:22 PM    TECHNIQUE:  Helical CT images from the thoracic inlet through the  symphysis pubis were obtained without IV contrast.     COMPARISON: None available.    HISTORY: s/p ECMO    FINDINGS:  Lines/tubes: Endotracheal tube within the high thoracic trachea.  Enteric tube within the stomach. Left femoral approach venous ECMO  cannula with the tip in the mid SVC. Left femoral intra-aortic balloon  pump with the superior metallic marker approximately 1.3 cm cephalad  to the thu. Right femoral artery ECMO cannula within the right  common iliac vein. Femoral venous PICC with the tip at the inferior  cavoatrial junction. Right inguinal subcutis hematoma measuring 0.9 x  5.6 cm. Urinary catheter with the balloon  inflated within the urinary  bladder.    CHEST:  LUNGS: The trachea and central airways are patent. Small bilateral  pleural effusions with consolidative opacities in the dependent lung  fields. Diffuse nodular groundglass opacities with smooth interlobular  septal thickening.     MEDIASTINUM: The visualized thyroid gland is unremarkable. The heart  size is within normal limits. Small pericardial effusion. Metallic  stent within the left anterior distending coronary artery. Descending  aorta and main pulmonary artery diameters are within normal limits.  Normal appearance and configuration of the great vessels off the  aortic arch. No suspicious mediastinal, hilar, or axillary lymph  nodes.    ABDOMEN/PELVIS:  LIVER: No focal hepatic mass.    BILIARY: The gallbladder is distended. No gallbladder wall thickening  or hyperdense stone. No intrahepatic or extrahepatic biliary ductal  dilatation.    PANCREAS: Within normal limits.    SPLEEN: Within normal limits.    ADRENAL GLANDS: 0.9 cm hypodense nodule within the right adrenal gland  consistent with a benign right adrenal adenoma. The left adrenal gland  is unremarkable.    URINARY TRACT: Hyperdense contrast within the collecting systems and  urinary bladder consistent with earlier same day coronary angiography.    REPRODUCTIVE ORGANS: Within normal limits.    STOMACH: Enteric tube within the gastric lumen with a moderate amount  of intragastric air and fluid.    BOWEL: Multiple fluid-containing loops of large and small bowel. The  small bowel appears relatively featureless which is a nonspecific  finding but can be seen in patient's with shock bowel. No pneumatosis,  portal venous gas, or pneumoperitoneum. The appendix is visualized and  is unremarkable.    PERITONEUM/FLUID: No free fluid. Small to moderate size right  retroperitoneal hematoma near the renal collecting system and just  inferior to the right kidney.    VESSELS: No aneurysmal dilatation of the abdominal  aorta.    LYMPH NODES: No lymphadenopathy.    BONES/SOFT TISSUES: No aggressive osseous lesions.      Impression    IMPRESSION:   1. Endotracheal tube projecting over the high thoracic trachea,  consider advancement approximately 2 cm.  2. Relatively featureless-appearing bowel which is a nonspecific  finding but can be seen in patient's with shock bowel. No pneumatosis,  portal venous gas, or pneumoperitoneum.  3. Small bilateral pleural effusions with consolidative opacities in  the dependent lung fields with diffuse scattered nodular groundglass  opacities and smooth interlobular septal thickening. Overall findings  suggestive of pulmonary edema underlying infection is not entirely  excluded.  4. Small right inguinal subcutaneous hematoma. Small to moderate sized  right retroperitoneal hematoma.  5. Benign right adrenal adenoma.  6. Additional support devices as detailed above.    [Urgent Result: High ETT and Shock Bowel]    Finding was identified on 10/2/2020 3:39 PM.     Dr. Conway  was contacted by Dr. Cook at 10/2/2020 4:00 PM and  verbalized understanding of the urgent finding.     I have personally reviewed the examination and initial interpretation  and I agree with the findings.    JEZ ENG MD   XR Chest Port 1 View    Narrative    EXAM: XR CHEST PORT 1 VW  10/2/2020 5:16 PM     HISTORY:  Check endotracheal tube placement and ECLS cannula  placement. DO NOT log-roll patient.  Place film under patient using  patient safety handling process.       COMPARISON:  Chest and pelvis CT 10/2/2020.    FINDINGS: AP radiograph of the chest. Endotracheal tube projecting  over the high thoracic trachea 6.4 cm cephalad to the thu.  Recommend advancement approximately 2 to 3 cm. Intra-aortic balloon  pump metallic marker projecting 2 cm cephalad to the thu. Inferior  approach cannula with the tip projected over the mid SVC. Inferior  approach PICC with the tip overlying the inferior cavoatrial  junction.  Partial visualization of enteric tube coursing inferior to the  diaphragm with the tip outside the field-of-view. The  cardiomediastinal silhouettes are within normal limits. Diffuse mixed  interstitial and airspace opacities in the right greater than left  lungs. No pneumothorax or large pleural effusion.      Impression    IMPRESSION:  1. Endotracheal tube projecting over the high thoracic trachea.  Consider advancement 2-3 cm.  2. Inferior approach a small cannula with the tip overlying the mid  SVC.  3. Intra-aortic balloon pump marker 2 cm cephalad to the thu.  4. Diffuse mixed interstitial and airspace opacities likely  representing pulmonary edema. Infection is not entirely excluded.    I have personally reviewed the examination and initial interpretation  and I agree with the findings.    DEMETRIUS HESTER MD       Labs:  Recent Labs   Lab 10/03/20  0544 10/03/20  0339 10/03/20  0152 10/03/20  0007   PH 7.30* 7.30* 7.27* 7.28*   PCO2 40 39 41 37   PO2 68* 141* 82 87   HCO3 20* 19* 19* 17*   O2PER 50 60 60 60       Lab Results   Component Value Date    HGB 7.2 (L) 10/03/2020    PHGB 30 (H) 10/03/2020     (L) 10/03/2020    FIBR 148 (L) 10/03/2020    INR 1.81 (H) 10/03/2020     (HH) 10/03/2020    DD >20.0 (H) 10/03/2020    AXA 0.34 10/03/2020         Plan is to give one unit of cryo when it arrives. The patient will begin rewarming sometime in the afternoon or evening. Will continue VA ECMO support and continue to monitor the cannulation site bleeding.      Maninder Miller, RT  ECMO Specialist  10/3/2020 6:25 AM

## 2020-10-03 NOTE — PROGRESS NOTES
Pt arrived to unit at about 1530, post VF arrest, THELMA placement, VA ECMO cannulation, and IABP placement. Pt already hypothermic, goal cool time 1530. 1 L LR and I L albumin given for low flow/chugging. COVID swab sent. Titrating gtts to maintain MAP >65. See flowsheets for assessment.

## 2020-10-03 NOTE — PROGRESS NOTES
Cardiology ICU Progress Note    Brief HPI Kathi Rehman ( MRN [id number]) [AKA Sachi Bolanos] is a 45 year old adult female with PMHx of PCOS presented to [facility] on 10/2/2020 following an out of hospital refractory VF cardiac arrest. Patient was reportedly going to urgent care when she collapsed getting out of the car.  Immediate bystander CPR. Initial rhythm was VF.Brief ROSC for 1 minute--> back into VF. Patient was shocked a total of 7 times, received 1 mg epi and 300 mg amiodarone.  Presented to [facility] Cath Lab approximately 1 hour after arrest on [facility] with asystolic rhythm.  Initial labs notable for PaO2 of 69 with a lactate of 19. Patient was placed on VA ECMO and underwent coronary angiogram which revealed proximal LAD occlusion for which PCI with THELMA 3.5 x 38mm to proximal LAD performed. Patient was loaded with ASA/Ticagrelor and finished Cangrelor x 6 hours.     Subjective and Interval: Overnight, pt bleeding from right arterial ECMO cannulation site. Numerous blood products received; 5 PRBC's, 1 Cryo, 5 FFP. CT scan with inguinal and RP hematoma, per below. Hemodynamically stable. Remains on VA ECMO, intubated, sedated, cool.    Assessment and plan by system:   Today's changes:  -- No Ticagrelor given last night. Cangrelor finished at 2000. Start Ticag now with load 180 mg, then 90 mg BID thereafter.   -- No ASA orders, start ASA 81 mg daily now. Last given yesterday afternoon.  -- Restart Heparin without bolus  -- Decrease ACT goal to 160-180 given bleeding. Rewarm at 1500 today, will rewarm earlier if bleeding worsens.   -- Rewarm slowly given initial head CT; 0.25 degrees q 2 hours.   -- Purse stitch around cannula and place Femstop with low pressure to correct arterial cannulae bleeding.  -- Echo today, likely echo with turndown tomorrow.      Neurology   # Concern for anoxic brain injury    -- Intubated, sedated. Not requiring paralytic. Cooled to 34 degrees.  Due to warm at 1500. Will plan to  warm slowly given head CT, 0.25 degrees q 2 hours.   -- Continue versed, fentanyl for sedation with a RASS goal -4 to -5   -- Neuro-crit consulted and appreciate recommendations.   -- vEEG   -- Palliative care consulted.   -- Head CT with likely hypoxic ischemic injury. Will repeat on day 3.     Cardiovascular  # Refractory VF Cardiac arrest 2/2 pLAD lesion s/p PCI and requiring VA ECMO  # CAD s/p PCI to pLAD  # ICM EF 10%  # Dyslipidemia  Peripheral V-A ECMO inserted via RCFA and RCFV. Flows 3-3.25, sweep at 1 lpm, RPM 3150  TTE: - EF initial 10%-20% large area of apical wall akinesis. No effusion. RV WNL.  EKG: Low voltage QRS, SR, QTc 523  -- Echo today  -- Echo with turndown tomorrow.  -- Continue ASA 81mg and ticagrelor 90mg BID   -- ACT goal decreased to 160-180  -- Hold lipitor for now given shock liver  -- Hold ACE/ARB for now given likely reduced renal fxn after arrest  -- Holding beta blocker given shock  -- Lipid panel   -- Telemetry monitoring;   -- Troponin  Continues to be >200; continue to trend to peak     Pulmonary  # Acute hypoxemic respiratory failure requiring intubation  # Probable aspiration pneumonia- worsening consolidation.  Vent Settings: CMV/12/480/7/50%, AB.33/42/104/22  ETT 2 above thu.   CXR: bibasilar opacities. CAP CT with GGO.  -- Wean vent as able  -- Daily CXR  --Q2h ABGs   -- Consider scheduled duonebs if signs of lung disease      Infectious Disease  # Probable aspiration Pneumonia- in the setting of VF arrest.  Consolidations and GGO.   # Leukocytosis- WBC 13k  -- Leukocytosis c/w arrest. Blood, urine, sputum cultures daily.  -- Vancomycin/zosyn x5 days  -- Daily blood cultures.   -- Monitor for signs of infection given cooling, lines, and leukocytosis  - antibiotics               - Vancomycin  -                - Zosyn  - present      Renal, Electrolytes  # Acute Renal Injury, likely 2/2  ATN  # Hypoalbuminemia  # Hypernatremic- -- Avoid hypotonic fluid given  "head CT.  -- Cr 1.47, UOP 1.6 L yesterday. Net + 4 L yesterday  -- Monitor urine output  -- Maintain K>3 and Mg>2 , Keep K > 4 once warm.        Gastrointestinal, Nutrition  # Shock liver 2/2 cardiac arrest- improving  No known medical hx.   -- CAP CT as below.   -- Monitor BID LFTs  -- NPO while cooled - Nutrition consult pending feeding tube placement once he is warmed   -- Bowel regimen - on hold for now due to hypothermia  -- GI Prophylaxis: PPI; Protonix 40 mg daily     Hematology  # Acute blood loss anemia  # Thrombocytopenia  # RP hematoma  # Right inguinal subcutaneous hematoma.   -- Femstop in place, current pressures 50-70 mm hg to prevent arterial site bleeding.   -- Purse string suture.   -- Decrease ACT goal to 160-180.   -- Warm early if needed.   -- If despite above measure, still bleeds significantly, did discuss with interventional staff ability to upsize arterial sheath to 17 fr. (currently 15 fr)  -- Heparin was being held due to bleeding. Resume now without bolus and lower ACT goal, as mentioned.   -- ASA/ticagrelor started for THELMA  -- US LE w/ arterial duplex pending  -- Carotid artery US pending   -- DVT PPX: Heparin as above     Endocrinology  # Hyperglycemia   -- No known medical history.   -- insulin gtt on currently  -- Hgb a1c 5.3    Integumentary:  - Bleeding concerns via RCFA access site.     Patient seen and discussed with staff physician.    ROC Ga, CNP  [facility]  Interventional Cardiology-CSI Service  Pager [phone]     Objective:  Most recent vital signs:  Pulse 67   Temp 93.2  F (34  C) (Bladder)   Resp 15   Ht 1.676 m (5' 6\")   Wt 97.9 kg (215 lb 13.3 oz)   SpO2 96%   BMI 34.84 kg/m    Temp:  [90.7  F (32.6  C)-93.4  F (34.1  C)] 93.2  F (34  C)  Pulse:  [] 67  Resp:  [12-37] 15  MAP:  [63 mmHg-276 mmHg] 73 mmHg  Arterial Line BP: ()/() 101/46  FiO2 (%):  [50 %-60 %] 60 %  SpO2:  [87 %-100 %] 96 %  Wt " Readings from Last 2 Encounters:   10/03/20 97.9 kg (215 lb 13.3 oz)       Intake/Output Summary (Last 24 hours) at 10/3/2020 1620  Last data filed at 10/3/2020 1600  Gross per 24 hour   Intake 6559.14 ml   Output 1631 ml   Net 4928.14 ml       Physical exam:  General: Intubated, sedated, on VA ECMO  HEENT: PERRL, no scleral icterus or injection  CARDIAC: RRR, no m/r/g appreciated. Peripheral pulses 1+  RESP: Coarse bilateral breath sounds, mechanical ventilation, no wheezes  GI: NABS, NT/ND, no guarding or rebound  : Babcock draining clear yellow urine.  EXTREMITIES: VA ECMO cannulas in placed. Mild LE edema, pulses 1+. RCFemoral access site w bleeding, dressing redressed, not currently oozing.   SKIN: No acute lesions appreciated, bleeding as above.  NEURO: Intubated and sedated.     Labs (Past three days):  CBC  Recent Labs   Lab 10/03/20  0339 10/02/20  2154 10/02/20  1556 10/02/20  1331   WBC 13.0* 15.2* 19.7*  --    RBC 2.29* 2.32* 3.01*  --    HGB 7.2* 7.5* 9.7* 10.7*   HCT 21.0* 21.7* 28.2*  --    MCV 92 94 94  --    MCH 31.4 32.3 32.2  --    MCHC 34.3 34.6 34.4  --    RDW 12.8 12.2 12.1  --    * 186 216  --      BMP  Recent Labs   Lab 10/03/20  0339 10/02/20  2154 10/02/20  1556 10/02/20  1331   * 147* 146* 145*   POTASSIUM 3.5 3.1* 3.2* 3.6   CHLORIDE 119* 118* 117*  --    CO2 20 16* 13*  --    ANIONGAP 9 13 16*  --    *  140* 183*  199* 277*  296*  296* 335*   BUN 29 24 18  --    CR 1.47* 1.27* 0.86  --    GFRESTIMATED 25* 30* Not Calculated  --    GFRESTBLACK 29* 35* Not Calculated  --    CARMELO 7.5* 7.6* 5.5*  --    MAG 2.1 2.7* 1.8  --    PHOS 2.7  --   --   --      Troponins:     INR  Recent Labs   Lab 10/03/20  0339 10/02/20  2154 10/02/20  1556   INR 1.81* 2.52* 3.53*     Liver panel  Recent Labs   Lab 10/03/20  0339 10/02/20  2154 10/02/20  1556   PROTTOTAL 4.7* 4.6* 4.0*   ALBUMIN 2.9* 3.1* 2.3*   BILITOTAL 0.7 1.0 0.6   ALKPHOS 17* 30* 52   * 710* 738*   * 188*  232*       Imaging/procedure results:  Echo 10/2/2020  Interpretation Summary  VA ECMO.  Severely (EF 10-20%) reduced left ventricular function is present. Large area  of anteroapical akinesis.  Right ventricular function, chamber size, wall motion, and thickness are  normal.  No pericardial effusion is present.  There is no prior study for direct comparison.    Cardiac cath 10/2/2020  Conclusion    --Refractory VF cardiac arrest.  --Successful insertion of peripheral V-A Extracorporeal Membrane Oxygenation - in the right femoral artery and and left femoral vein.  --Successful insertion of a distal antegrade flow cannula in the right SFA.  --Single vessel coronary artery disease without left main involvement.   --Successful deployment of a 3.5x38mm Synergy drug eluting stent to the proximal  LAD.  --Successful insertion of a Porter + Saill Quattro intravenous cooling catheter in the right femoral vein.  --Successful insertion of intra-aortic balloon pump in the left common femoral artery.  --Successful insertion of right radial arterial line for hemodynamic monitoring.          Plan    --Aspirin 81 mg po daily lifelong.  --Ticagrelor 90 mg po bid for at least 1 year uninterrupted.  -- Continue cangrelor for two hours after the administration of ticagrelor loading dose.   --Bedrest for duration of ECMO therapy.  --Targeted temperature management x 24hrs.  --Continued ECMO and heparin as needed for ACT goal 180-200.  --Epinephrine and vasopressin to maintain MAP>60.  --Admit to the ICU for continued evaluation and management.   Coronary Findings    Diagnostic  Dominance: Right  Left Anterior Descending   Ost LAD to Prox LAD lesion is 100% stenosed. Culprit lesion. The lesion was not previously treated. The stenosis was measured by a visual reading.     Intervention    Ost LAD to Prox LAD lesion   Stent   Lesion length: 30 mm. CATH GUIDING BLUE YELLOW PTFE XB3.5 [id number] guide catheter was successful. The guidewire  guidewire crosses lesion There is no pre-interventional antegrade distal flow (LINETTE 0). A STENT SYNERGY DRUG ELUTING 3.38R39OP [id number] drug eluting stent was successfully placed. Pre-stent angioplasty was performed using a CATH BALLOON EMERGE 2.5X15MM [id number] supply. Maximum pressure: 14 skyler. Inflation time: 10 sec. . The strut is apposed. Post-stent angioplasty was performed using a CATH BALLOON NC EMERGE 5.07P09VQ [id number] supply. Maximum pressure: 16 skyler. Inflation time: 10 sec. . The post-interventional distal flow is normal (LINETTE 3). The intervention was successful. The patient experienced the no reflow phenomenon in this vessel. IVUS was performed on the lesion. Ultrasound supply: CATH US OD 5FR OD SEC 2.9FR EAGLE EYE PLATINUM 0.014 [id number]. Cross-sectional area: 19.8 mm . Minimum stent area: 8.3 mm . A second IVUS was performed on the lesion. Ultrasound supply: CATH US OD 5FR OD SEC 2.9FR EAGLE EYE PLATINUM 0.014 [id number].   There is a 0% residual stenosis post intervention.     Extracorporeal Membrane Oxygenation    Extracorporeal Membrane Oxygenation    PROCEDURE REPORT:    EXTRACORPOREAL MEMBRANE OXYGENATION CANNULATION:  A 15Fr arterial ECMO cannula was inserted into the right common femoral artery and a 25Fr venous cannula was inserted into the left femoral vein.  The arterial cannula was positioned in the iliac artery and the venous canula was positioned across the RA.  The ECMO circuit was primed, closely inspected to ensure no bubbles present and ECMO was initiated.  Right lower extremity perfusion was restored using a 9Fr antegrade perfusion catheter in the R SFA.   Line Placement    Line Placement    Arterial Line was placed. Ultrasound was used to visualize.  Therapeutic Hypothermia    Therapeutic Hypothermia    Therapeutic hypothermia was initiated using thermogard. Details of the thermogard cannula insertion can be found in the central venous catheter insertion section. Goal  temperature: 34  C. Successful initiation of therapeutic hypothermia. 6 long replaced with Thermogard

## 2020-10-03 NOTE — PLAN OF CARE
Major Shift Events:   Neuro: Pupils 2mm, round equal and fixed. Slight contraction in BLE, withdraws slightly to pain in BUE, does not follow commands.     Resp: VC-AC, FiO2 50%, RR 12, , PEEP 7. Pt does breathe over the vent at 17-25, lung sounds now clear over diminished, copious frothy pink/red sputum via ET tube at beginning of shift (450 mL out in 4 hours). Weak productive cough.     Cards: Sinus rhythm, HR 65-80, cooling temperature maintained at 34C, MAP goal > 65 met with no pressors, pulses dopplerable. IABP 1:1, 100% Augmentation. ECMO flows 3.5-3.7 LPM. Multiple blood products given for bleeding (see separate note). Goal is pH > 7.1, CO2 40-50.  Product given this shift - 5 FFP, 3 PRBCs, 1L Albumin, 1L LR.    GI: OG to LIS, 150 mL of green bile out, absent bowel sounds, no BM.    : 50-60 mL/hr of green-tinged UOP via montoya catheter.     GTTs: Versed @ 8, Fentanyl @ 75, Insulin @ 0.5.    Plan: Continue to monitor bleeding from right arterial ECMO cannulation site, EEG leads to be placed when pt is available.    For vital signs and complete assessments, please see documentation flowsheets.      Clindamycin Counseling: I counseled the patient regarding use of clindamycin as an antibiotic for prophylactic and/or therapeutic purposes. Clindamycin is active against numerous classes of bacteria, including skin bacteria. Side effects may include nausea, diarrhea, gastrointestinal upset, rash, hives, yeast infections, and in rare cases, colitis.

## 2020-10-03 NOTE — PHARMACY-VANCOMYCIN DOSING SERVICE
Pharmacy Empiric Dose Change Per Policy    Original Dose Ordered: 1750mg IV vancomycin q24h  Dose Changed To: 1500mg IV vancomycin q24h due to ROGERS (and patient weight - this is still ~17mg/kg actual body weight and ~20mg/kg adjusted body weight).     This dose change was based on the pharmacist's assessment of this patient's age, weight, concurrent drug therapy, treatment goals, whether patient's creatinine clearance adequately indicates renal function (factoring in age, muscle mass, fluid and clinical status), and, if applicable, prior pharmacokinetic data.    Creatinine Clearance=     Estimated Creatinine Clearance: ~50mL/min    Will continue to follow and modify dosage according to levels, organ function and clinical condition.    Miryam Alvarez, PharmD, BCCCP

## 2020-10-03 NOTE — PHARMACY-ADMISSION MEDICATION HISTORY
Admission medication history interview status for the 10/2/2020 admission is complete. See Epic admission navigator for allergy information, pharmacy, prior to admission medications and immunization status.     Medication history interview sources:  chart review and review of fill records in [facility] - NO interview completed due to clinical status of patient    Changes made to PTA medication list (reason)  Added: none  Deleted: none  Changed: none    Additional medication history information (including reliability of information, actions taken by pharmacist):  -Per review of available information for Kathi Rehman [phone] patient has historically used a steroid cream for rash but is currently on no prescription medications.    Prior to Admission medications    Not on File       Medication history completed by:     Miryam Alvarez, PharmD, BCCCP

## 2020-10-04 NOTE — PROGRESS NOTES
ECMO Shift Summary:      ECMO Equipment:  Console serial number: [id number]  Circuit Lot number: [id number]  Oxygenator Lot number: [id number]       Patient remains on VA ECMO, all equipment is functioning and alarms are appropriately set. RPM's 3335 with flow range 3.4 L/min. Sweep gas is at 0.5 LPM and FiO2 40%. Circuit remains free of air, clot and fibrin. Cannulas are secure with no bleeding from site. Extremities are cool. Suctioned ETT for small amount of secretions.    Significant Shift Events: Moved from SICU to CVICU    Vent settings:  Ventilation Mode: CMV/AC  (Continuous Mandatory Ventilation/ Assist Control)  FiO2 (%): 50 %  Rate Set (breaths/minute): 10 breaths/min  Tidal Volume Set (mL): 400 mL  PEEP (cm H2O): 7 cmH2O  Oxygen Concentration (%): 50 %  Resp: 15  .    Heparin is running at 700 u/hr, ACT range 158-162.    Urine output is as charted, blood loss was oozing from both groin sites. Product given included none.      Intake/Output Summary (Last 24 hours) at 10/4/2020 1432  Last data filed at 10/4/2020 1400  Gross per 24 hour   Intake 1421.03 ml   Output 1989 ml   Net -567.97 ml       ECHO:  No results found for this or any previous visit.No results found for this or any previous visit.    CXR:  Recent Results (from the past 24 hour(s))   XR Chest Port 1 View    Narrative    XR CHEST PORT 1 VW  10/4/2020 5:23 AM      HISTORY: Check endotracheal tube placement and ECLS cannula placement.  DO NOT log-roll patient.  Place film under patient using patient  safety handling process.    COMPARISON: Chest x-ray 10/3/2020    TECHNIQUE: Supine frontal view of the chest    FINDINGS: Significant bilateral airspace opacities with mild  improvement. No pneumothorax or significant pleural effusion. Cardiac  mediastinal silhouette is within normal limits. Trachea is midline.     Endotracheal tube tip projects over the midthoracic trachea. Inferior  approach ECMO cannula with tip projecting over the mid SVC.  Inferior  approach CVC with tip projecting over the right atrium. Intra-aortic  balloon pump marker measures 1.3 cm superior to the thu. NG/OG tube  projects over the stomach.      Impression    IMPRESSION:   1. Mildly improved bilateral patchy airspace opacities, ARDS versus  pulmonary edema versus infection.  2. Stable support devices.    I have personally reviewed the examination and initial interpretation  and I agree with the findings.    DEMETRIUS HESTER MD       Labs:  Recent Labs   Lab 10/04/20  1215 10/04/20  1014 10/04/20  0813 10/04/20  0608   PH 7.41 7.41 7.40 7.40   PCO2 35 35 34* 35   PO2 103 96 98 111*   HCO3 22 22 21 22   O2PER 50 50 50.0 40.0       Lab Results   Component Value Date    HGB 8.0 (L) 10/04/2020    PHGB <30 10/04/2020    PLT 78 (L) 10/04/2020    FIBR 374 10/04/2020    INR 1.81 (H) 10/04/2020    PTT 76 (H) 10/04/2020    DD 9.3 (H) 10/04/2020    AXA 0.17 10/04/2020    ANTCH 56 (L) 10/04/2020         Plan is continue to provide support.      Miesha Monge, RT  ECMO Specialist  10/4/2020 2:32 PM

## 2020-10-04 NOTE — PROGRESS NOTES
Cardiology ICU Progress Note    Brief HPI Kathi Rehman ( MRN [id number]) [AKA Sachi Bolanos] is a 45 year old adult female with PMHx of PCOS presented to [facility] on 10/2/2020 following an out of hospital refractory VF cardiac arrest. Patient was reportedly going to urgent care when she collapsed getting out of the car.  Immediate bystander CPR. Initial rhythm was VF.Brief ROSC for 1 minute--> back into VF. Patient was shocked a total of 7 times, received 1 mg epi and 300 mg amiodarone.  Presented to [facility] Cath Lab approximately 1 hour after arrest on Suraj with asystolic rhythm.  Initial labs notable for PaO2 of 69 with a lactate of 19. Patient was placed on VA ECMO and underwent coronary angiogram which revealed proximal LAD occlusion for which PCI with THELMA 3.5 x 38mm to proximal LAD performed. Patient was loaded with ASA/Ticagrelor and finished Cangrelor x 6 hours.     Subjective and Interval:   Remains on VA ECMO, intubated, sedated. No further blood products    Assessment and plan by system:   Today's changes:  -- Echo with turndown  -- Nutrition consult, start TF  -- Start Bowel regimen  -- Remove IO  -- Hopeful decann tomorrow/soon     Neurology   # Concern for anoxic brain injury    -- Intubated, sedated.   -- S/p cooling to 34 degrees. Warm since this am 0900.   -- Continue versed, fentanyl for sedation with a RASS goal -4 to -5   -- Neuro-crit consulted and appreciate recommendations.   -- vEEG without seizures, generalized slowing.   -- Palliative care consulted.   -- Head CT with likely hypoxic ischemic injury. Will repeat on day 3.  -- Continue neuro protective strategies like co2 40-50, elevate hob to 30 degrees, avoid hypotonic fluid     Cardiovascular  # Refractory VF Cardiac arrest 2/2 pLAD lesion s/p PCI and requiring VA ECMO  # CAD s/p PCI to pLAD  # ICM EF 10%  # Dyslipidemia  Peripheral V-A ECMO inserted via RCFA and RCFV. Flows 3.5, sweep at 0.5 lpm  TTE: - EF initial 10%-20% large area  of apical wall akinesis. No effusion. RV WNL.  EKG: Low voltage QRS, SR, QTc stable  -- Echo with turndown today  -- Continue ASA 81mg and ticagrelor 90mg BID   -- ACT goal decreased to 160-180  -- Hold lipitor for now given shock liver  -- Hold ACE/ARB for now given likely reduced renal fxn after arrest  -- Holding beta blocker given shock  -- Lipid panel with LDL 24 HDL 26, trigs89  -- Telemetry monitoring  -- Troponin  Continues to be >200--> 136.4     Pulmonary  # Acute hypoxemic respiratory failure requiring intubation  # Probable aspiration pneumonia- worsening consolidation.  Vent Settings: CMV/12/480/7/50%, AB.33/42/104/22  ETT 2 above thu.   CXR: bibasilar opacities. CAP CT with GGO.  -- Minimize vent settings for pulmonary rest on ECMO  -- Wean vent as able  -- Daily CXR  --Q2h ABGs   -- Consider scheduled duonebs if signs of lung disease    -- Peridex and vent bundle    Infectious Disease  # Probable aspiration Pneumonia- in the setting of VF arrest.  Consolidations and GGO.   # Leukocytosis- WBC 13k  -- Leukocytosis c/w arrest. Blood, urine, sputum cultures daily.  -- Vancomycin/zosyn x5 days  -- Daily blood cultures.   -- Monitor for signs of infection given cooling, lines, and leukocytosis  - antibiotics               - Vancomycin  -                - Zosyn  - present      Renal, Electrolytes  # Acute Renal Injury, likely 2/2  ATN  # Hypoalbuminemia  # Hypernatremic- -- Avoid hypotonic fluid given head CT.  -- Cr 1.9, UOP 1.4 L yesterday. Net + 2.9 L yesterday, Net + 7.8 L since admission.  -- Monitor urine output  -- Maintain K>3 and Mg>2 , Keep K > 4 once warm.        Gastrointestinal, Nutrition  # Shock liver 2/2 cardiac arrest- improving  No known medical hx.   -- CAP CT as below.   -- Monitor LFTs  -- Nutrition consult  -- Bowel regimen, start now  -- GI Prophylaxis: PPI; Protonix 40 mg daily     Hematology  # Acute blood loss anemia  # Thrombocytopenia  # RP hematoma  # Right  "inguinal subcutaneous hematoma.   -- Purse string suture.   -- Decrease ACT goal to 160-180.   -- Heparin for ECMO  -- ASA/ticagrelor started for THELMA  -- US LE w/ arterial duplex   -- Carotid artery US pending pending  -- DVT PPX: Heparin as above     Endocrinology  # Hyperglycemia   -- No known medical history.   -- insulin gtt on currently  -- Hgb a1c 5.3    Integumentary:  - no acute lesions    Patient seen and discussed with staff physician.    ROC Ga, CNP  [facility]  Interventional Cardiology-CSI Service  Pager [phone]     Objective:  Most recent vital signs:  Pulse 90   Temp 96.8  F (36  C) (Bladder)   Resp 15   Ht 1.676 m (5' 6\")   Wt 96.1 kg (211 lb 13.8 oz)   SpO2 100%   BMI 34.20 kg/m    Temp:  [92.7  F (33.7  C)-96.8  F (36  C)] 96.8  F (36  C)  Pulse:  [59-92] 90  Resp:  [12-18] 15  MAP:  [71 mmHg-91 mmHg] 81 mmHg  Arterial Line BP: ()/(39-72) 104/50  FiO2 (%):  [50 %-60 %] 50 %  SpO2:  [98 %-100 %] 100 %  Wt Readings from Last 2 Encounters:   10/04/20 96.1 kg (211 lb 13.8 oz)       Intake/Output Summary (Last 24 hours) at 10/4/2020 1819  Last data filed at 10/4/2020 1800  Gross per 24 hour   Intake 1464.03 ml   Output 1859 ml   Net -394.97 ml         Physical exam:  General: Intubated, sedated, on VA ECMO  HEENT: PERRL, no scleral icterus or injection  CARDIAC: RRR, no m/r/g appreciated. Peripheral pulses 1+  RESP: Coarse bilateral breath sounds, mechanical ventilation, no wheezes  GI: NABS, NT/ND, no guarding or rebound  : Babcock draining clear yellow urine.  EXTREMITIES: VA ECMO cannulas in placed. Mild LE edema, pulses 1+.   SKIN: No acute lesions appreciated, bleeding as above.  NEURO: Intubated and sedated.     Labs (Past three days):  CBC  Recent Labs   Lab 10/04/20  0346 10/03/20  2202 10/03/20  1611 10/03/20  1041   WBC 16.3* 15.6* 14.3* 13.6*   RBC 2.76* 2.89* 2.95* 3.00*   HGB 8.5* 8.9* 9.1* 9.4*   HCT 24.3* 25.7* 26.2* 27.1*   MCV " 88 89 89 90   MCH 30.8 30.8 30.8 31.3   MCHC 35.0 34.6 34.7 34.7   RDW 14.0 14.0 13.6 13.6   PLT 72* 73* 68* 79*     BMP  Recent Labs   Lab 10/04/20  0346 10/03/20  2202 10/03/20  1611 10/03/20  1041 10/03/20  0339   * 147* 147* 148* 148*   POTASSIUM 3.8 3.8 3.9 3.7 3.5   CHLORIDE 118* 118* 118* 120* 119*   CO2 21 22 21 20 20   ANIONGAP 8 7 7 9 9   *  119* 90  99 109*  117* 139*  151* 130*  140*   BUN 36* 36* 34* 31* 29   CR 1.96* 1.81* 1.76* 1.70* 1.47*   GFRESTIMATED 18* 19* 20* 21* 25*   GFRESTBLACK 21* 23* 23* 24* 29*   CARMELO 7.8* 7.8* 7.9* 7.0* 7.5*   MAG 2.2 2.4* 2.5* 1.8 2.1   PHOS 5.2*  --   --   --  2.7     Troponins:     INR  Recent Labs   Lab 10/04/20  0346 10/03/20  2202 10/03/20  1611 10/03/20  1041   INR 1.63* 1.56* 1.54* 1.53*     Liver panel  Recent Labs   Lab 10/04/20  0346 10/03/20  2202 10/03/20  1611 10/03/20  1041   PROTTOTAL 4.7* 4.7* 4.7* 4.6*   ALBUMIN 2.4* 2.6* 2.6* 2.7*   BILITOTAL 1.0 1.0 1.0 0.8   ALKPHOS 44 39* 38* 36*   * 431* 480* 509*   * 135* 137* 139*       Imaging/procedure results:  Echo 10/2/2020  Interpretation Summary  VA ECMO.  Severely (EF 10-20%) reduced left ventricular function is present. Large area  of anteroapical akinesis.  Right ventricular function, chamber size, wall motion, and thickness are  normal.  No pericardial effusion is present.  There is no prior study for direct comparison.    Cardiac cath 10/2/2020  Conclusion    --Refractory VF cardiac arrest.  --Successful insertion of peripheral V-A Extracorporeal Membrane Oxygenation - in the right femoral artery and and left femoral vein.  --Successful insertion of a distal antegrade flow cannula in the right SFA.  --Single vessel coronary artery disease without left main involvement.   --Successful deployment of a 3.5x38mm Synergy drug eluting stent to the proximal  LAD.  --Successful insertion of a Zoll Quattro intravenous cooling catheter in the right femoral vein.  --Successful  insertion of intra-aortic balloon pump in the left common femoral artery.  --Successful insertion of right radial arterial line for hemodynamic monitoring.          Plan    --Aspirin 81 mg po daily lifelong.  --Ticagrelor 90 mg po bid for at least 1 year uninterrupted.  -- Continue cangrelor for two hours after the administration of ticagrelor loading dose.   --Bedrest for duration of ECMO therapy.  --Targeted temperature management x 24hrs.  --Continued ECMO and heparin as needed for ACT goal 180-200.  --Epinephrine and vasopressin to maintain MAP>60.  --Admit to the ICU for continued evaluation and management.   Coronary Findings    Diagnostic  Dominance: Right  Left Anterior Descending   Ost LAD to Prox LAD lesion is 100% stenosed. Culprit lesion. The lesion was not previously treated. The stenosis was measured by a visual reading.     Intervention    Ost LAD to Prox LAD lesion   Stent   Lesion length: 30 mm. CATH GUIDING BLUE YELLOW PTFE XB3.5 [id number] guide catheter was successful. The guidewire guidewire crosses lesion There is no pre-interventional antegrade distal flow (LINETTE 0). A STENT SYNERGY DRUG ELUTING 3.77V78SB [id number] drug eluting stent was successfully placed. Pre-stent angioplasty was performed using a CATH BALLOON EMERGE 2.5X15MM [id number] supply. Maximum pressure: 14 skyler. Inflation time: 10 sec. . The strut is apposed. Post-stent angioplasty was performed using a CATH BALLOON NC EMERGE 5.37X00JN [id number] supply. Maximum pressure: 16 skyler. Inflation time: 10 sec. . The post-interventional distal flow is normal (LINETTE 3). The intervention was successful. The patient experienced the no reflow phenomenon in this vessel. IVUS was performed on the lesion. Ultrasound supply: CATH US OD 5FR OD SEC 2.9FR EAGLE EYE PLATINUM 0.014 [id number]. Cross-sectional area: 19.8 mm . Minimum stent area: 8.3 mm . A second IVUS was performed on the lesion. Ultrasound supply: CATH US OD 5FR OD SEC  2.9FR EAGLE EYE PLATINUM 0.014 [id number].   There is a 0% residual stenosis post intervention.     Extracorporeal Membrane Oxygenation    Extracorporeal Membrane Oxygenation    PROCEDURE REPORT:    EXTRACORPOREAL MEMBRANE OXYGENATION CANNULATION:  A 15Fr arterial ECMO cannula was inserted into the right common femoral artery and a 25Fr venous cannula was inserted into the left femoral vein.  The arterial cannula was positioned in the iliac artery and the venous canula was positioned across the RA.  The ECMO circuit was primed, closely inspected to ensure no bubbles present and ECMO was initiated.  Right lower extremity perfusion was restored using a 9Fr antegrade perfusion catheter in the R SFA.   Line Placement    Line Placement    Arterial Line was placed. Ultrasound was used to visualize.  Therapeutic Hypothermia    Therapeutic Hypothermia    Therapeutic hypothermia was initiated using thermogard. Details of the thermogard cannula insertion can be found in the central venous catheter insertion section. Goal temperature: 34  C. Successful initiation of therapeutic hypothermia. 6 long replaced with Thermogard

## 2020-10-04 NOTE — CONSULTS
[facility]   WOC Nurse Inpatient Adult Pressure Injury Prevention Assessment: ECMO  Initial     Positioning Tolerance: Fair  Date of ECMO cannulation: 10/2  Presence of Ischemia: No  Location of ischemia: n/a    Pressure Injury Prevention Interventions In Place:  Optifoam Dressing under ECMO Cannula, Z flow Positioner under head, Pillows for repositioning, TAPs Wedge Positioners in use, Heel off-loading boots, Pillows under calves for heel suspension and Mepilex Sacral Dressing   Current support surface: Standard  Low air loss mattress       Pressure Injury Prevention Interventions Added:  None        Plan of Care for Positioning and Pressure Injury Prevention  Reposition patient every 1-2 hours using TAP Wedges  Position head on Z flow positioner, mold indentation at areas of pressure points.  Pad ECMO IJ cannula with Optifoam (#[id number]) along face and scalp between skin and cannula and under Coban head wraps    Pad ECMO groin and chest cannula under rigid connectors with Optifoam or Soft cloth  Heel off-loading Boots at all times  Sacral Mepilex for Prevention, change every 5 days and prn  Low Air loss mattress    Patient History:   According to medical record:  Sachi Bolanos is a 44 year old female who was cannulated for ECMO 10/2/2020 due to refractory VF arrest       Current Diet / Nutrition:     Orders Placed This Encounter      NPO for Medical/Clinical Reasons Except for: No Exceptions      Output:    I/O last 3 completed shifts:  In: 2796.63 [I.V.:1747.63; NG/GT:50]  Out: 1539 [Urine:1314; Emesis/NG output:225]  Containment: of urine/stool: Urinary Catheter    Risk Assessment:   Sensory Perception: 1-->completely limited  Moisture: 3-->occasionally moist  Activity: 1-->bedfast  Mobility: 1-->completely immobile  Nutrition: 2-->probably inadequate  Friction and Shear: 1-->problem  Justus Score: 9    Labs:    Recent Labs   Lab 10/04/20  1014 10/04/20  3449  10/02/20  1556 10/02/20  1556   ALBUMIN 2.5* 2.4*   < > 2.3*   HGB 8.0* 8.5*   < > 9.7*   INR 1.81* 1.63*   < > 3.53*   WBC 17.7* 16.3*   < > 19.7*   A1C  --   --   --  5.3   CRP  --  170.0*   < > <2.9    < > = values in this interval not displayed.       Focused Assessment: bilateral Right groin ECMO cannula, Left groin ECMO cannula, Ears, Face and Feet    Pressure Injury Present::No   Tongue high risk for pressure injury formation due to swelling and trauma.  Bite block removed and using molar blocks.  Bruising due to trauma noted.     right    left      Education provided to: nurse  Discussed importance of:their role in pressure injury prevention  Discussed plan of care with Nurse  WOC Nurse follow-up plan:later this week    Deborah Chamorro RN CWOCN

## 2020-10-04 NOTE — PHARMACY-VANCOMYCIN DOSING SERVICE
Pharmacy Empiric Dose Change Per Policy    Original Dose Ordered: 1500mg IV vancomycin q24h   Dose Changed To: intermittent vancomycin dosing pending level check tonight - ROGERS and urine output dropping    This dose change was based on the pharmacist's assessment of this patient's age, weight, concurrent drug therapy, treatment goals, whether patient's creatinine clearance adequately indicates renal function (factoring in age, muscle mass, fluid and clinical status), and, if applicable, prior pharmacokinetic data.    Creatinine Clearance=  Estimated Creatinine Clearance: ~43mL/min    Will continue to follow and modify dosage according to levels, organ function and clinical condition.    Miryam Alvarez, PharmD, BCCCP

## 2020-10-04 NOTE — PROGRESS NOTES
Pt remains on VA ECMO and IABP support. Rewarmed to 37 deg C at 1600. Hemodynamics stable off pressors. Montoya not draining, replaced with new montoya. Pt' , Eben, at bedside today. Updated by myself and CSI team. ECMO turndown done this evening, pt tolerated well. Pt's tongue with increased swelling and severe ecchymosis, team aware and WOCN saw today. Will continue to monitor.

## 2020-10-04 NOTE — PLAN OF CARE
Major Shift Events:  Neuro: Pupils 2mm, round equal and sluggish. Slight contraction in BLE, withdraws slightly to pain in BUE, does not follow commands.      Resp: VC-AC, FiO2 50%, RR 10, , PEEP 7. Pt overbreathes vent, lung sounds clear over diminished, tongue is protruding from mouth d/t swelling, WOC consult placed overnight.     Cards: Sinus rhythm, HR 65-90, re-warming at 0.15 degree/hr, MAP goal > 65 met with no pressors, pulses dopplerable BLE, palpable in BUE. IABP 1:1, 100% Augmentation. ECMO flows 3.5 LPM. Goal CO2 40-50. Minor leaking from right arterial ECMO cannulation site.     GI: OG to LIS, 75 mL of green bile out, absent bowel sounds, no BM.     : 40-60 mL/hr UOP via montoya catheter.      GTTs: Heparin gtt for reperfusion catheter, Versed @ 5, Fentanyl @ 100, Insulin off at 2200 10/3 with glucs 110-116.     Plan: Continue to monitor bleeding from right arterial ECMO cannulation site, EEG monitoring, re-warming in process.    For vital signs and complete assessments, please see documentation flowsheets.

## 2020-10-04 NOTE — PROGRESS NOTES
ECMO Shift Summary:    ECMO Equipment:  Console serial number: [id number]  Circuit Lot number: [id number]  Oxygenator Lot number: [id number]    Patient remains on VA ECMO, all equipment is functioning and alarms are appropriately set. RPM's 3440 with flow range 3.5-3.65 L/min. Sweep gas is at 0.5 LPM and FiO2 40%. Circuit remains free of air, clot and fibrin. Cannulas are secure with a small to moderate amount of oozing from the arterial cannulation site; there is slight oozing at the venous cannulation site and arterial line site. Extremities are cool to touch as the patient is still in the therapeutic rewarming process. Suctioned ETT for a small amount of thin, red-streaked secretions.    Significant Shift Events:     Far less oozing from the arterial cannulation site. Hemoglobin has slowly drifted down overnight but no blood product was given overnight.    PCO2 has been less than 40 despite the sweep being 0.5 L/min and decreasing the ventilator rate to 10 breaths/min; the patient will often over-breathe the set ventilator rate.      Vent settings:  Ventilation Mode: CMV/AC  (Continuous Mandatory Ventilation/ Assist Control)  FiO2 (%): 50 %  Rate Set (breaths/minute): (S) 10 breaths/min  Tidal Volume Set (mL): 400 mL  PEEP (cm H2O): 7 cmH2O  Oxygen Concentration (%): 60 %  Resp: 16  .    Heparin is running at 700 u/hr, ACT range 158-175.    Urine output is adequate, blood loss was a moderate amount from oozing mostly at the arterial cannulation site. No blood product given overnight.      Intake/Output Summary (Last 24 hours) at 10/4/2020 0630  Last data filed at 10/4/2020 0609  Gross per 24 hour   Intake 2796.63 ml   Output 1539 ml   Net 1257.63 ml       ECHO:  No results found for this or any previous visit.No results found for this or any previous visit.    CXR:  Recent Results (from the past 24 hour(s))   Echocardiogram Complete    Narrative    [id number]  [id number]  [id number]  [id number]^NATALI^ARTHUR            [facility],[place]  Echocardiography Laboratory  [address] [place] [address]     Name: HOMERO RAMÍREZ  MRN: [id number]  : 1898  Study Date: 10/03/2020 11:34 AM  Age: 89 yrs  Gender: Female  Patient Location: [facility]  Reason For Study: Cardiac Arrest  Ordering Physician: ARTHUR HURST  Performed By: NEHEMIAS Turcios     BP: 107/45 mmHg  _____________________________________________________________________________  __        Procedure  Complete Portable Echo Adult.  _____________________________________________________________________________  __        Interpretation Summary  VA ECMO.  Severely (EF 10-20%) reduced left ventricular function is present. Large area  of anteroapical akinesis.  Right ventricular function, chamber size, wall motion, and thickness are  normal.  No pericardial effusion is present.  There is no prior study for direct comparison.  _____________________________________________________________________________  __        Left Ventricle  Left ventricular wall thickness is normal. Left ventricular size is normal.  Severely (EF 10-20%) reduced left ventricular function is present. Left  ventricular diastolic function is not assessable. Apical wall akinesis is  present. Anterior wall akinesis is present.     Right Ventricle  Right ventricular function, chamber size, wall motion, and thickness are  normal.     Atria  Both atria appear normal.     Mitral Valve  The mitral valve is normal.     Aortic Valve  Aortic valve is normal in structure and function.        Tricuspid Valve  The tricuspid valve is normal. The peak velocity of the tricuspid regurgitant  jet is not obtainable.     Pulmonic Valve  The pulmonic valve is normal.     Vessels  The aorta root is normal. The inferior vena cava is normal.     Pericardium  No pericardial effusion is present.     Compared to Previous Study  There is no prior study for direct comparison.      _____________________________________________________________________________  __  MMode/2D Measurements & Calculations  IVSd: 1.1 cm  LVIDd: 3.8 cm  LVIDs: 3.2 cm  LVPWd: 0.90 cm  FS: 15.1 %     LV mass(C)d: 113.1 grams     EF(MOD-bp): 13.2 %  RWT: 0.47        Doppler Measurements & Calculations  PA V2 max: 66.9 cm/sec  PA max P.8 mmHg     _____________________________________________________________________________  __           Report approved by: Alex Vega 10/03/2020 02:10 PM          Labs:  Recent Labs   Lab 10/04/20  0608 10/04/20  0346 10/04/20  0202 10/04/20  0004   PH 7.40 7.40 7.39 7.38   PCO2 35 34* 35 37   PO2 111* 114* 88 165*   HCO3 22 21 21 22   O2PER 40.0 50.0 50.0 60.0       Lab Results   Component Value Date    HGB 8.5 (L) 10/04/2020    PHGB <30 10/04/2020    PLT 72 (L) 10/04/2020    FIBR 374 10/04/2020    INR 1.63 (H) 10/04/2020    PTT 97 (H) 10/04/2020    DD 9.3 (H) 10/04/2020    AXA 0.27 10/04/2020    ANTCH 53 (L) 10/03/2020         Plan is to continue the gradual re-warming process. Will continue VA ECMO support.      Maninder Miller, RT  ECMO Specialist  10/4/2020 6:30 AM

## 2020-10-05 PROBLEM — G93.6 CEREBRAL EDEMA (H): Status: ACTIVE | Noted: 2020-01-01

## 2020-10-05 NOTE — CONSULTS
Otolaryngology Consult Note  October 5, 2020      CC: tongue swelling    HPI: Kathi Rehman is a 45 year old female with a past medical history of PCOS who presented to the hospital 10/2 following out of hospital cardiac arrest. She received CPR and was shocked 7 times. She was brought to the cath lab 1 hour after arrest on the [place] with asystolic rhythm, found to have a proximal % occlusion, now s/p PCI with THELMA. She has been on VA ECMO since day of admission, plans to possibly decannulate ECMO today. Heparin drip continues while patient is on ECMO. ENT was consulted for tongue swelling. She has been intubated for 4 days. The [facility] RN was consulted for evaluation and felt that her tongue was traumatized from the endotracheal tube bite block. This was removed, and WO RN placed a molar bite block on the right.     Per bedside RN, tongue has been increasing in size over the past few days. No witnessed trauma to the tongue.     No past medical history on file.    No past surgical history on file.    No current outpatient medications on file.        No Known Allergies    Social History     Socioeconomic History     Marital status: Not on file     Spouse name: Not on file     Number of children: Not on file     Years of education: Not on file     Highest education level: Not on file   Occupational History     Not on file   Social Needs     Financial resource strain: Not on file     Food insecurity     Worry: Not on file     Inability: Not on file     Transportation needs     Medical: Not on file     Non-medical: Not on file   Tobacco Use     Smoking status: Not on file   Substance and Sexual Activity     Alcohol use: Not on file     Drug use: Not on file     Sexual activity: Not on file   Lifestyle     Physical activity     Days per week: Not on file     Minutes per session: Not on file     Stress: Not on file   Relationships     Social connections     Talks on phone: Not on file     Gets together: Not on file     " Attends Alevism service: Not on file     Active member of club or organization: Not on file     Attends meetings of clubs or organizations: Not on file     Relationship status: Not on file     Intimate partner violence     Fear of current or ex partner: Not on file     Emotionally abused: Not on file     Physically abused: Not on file     Forced sexual activity: Not on file   Other Topics Concern     Not on file   Social History Narrative     Not on file       No family history on file.    ROS: 12 point review of systems is negative unless noted in HPI.    PHYSICAL EXAM:  General: laying in bed, intubated and on ECMO, unresponsive  Pulse 108   Temp 98.4  F (36.9  C) (Bladder)   Resp 17   Ht 1.676 m (5' 6\")   Wt 95.8 kg (211 lb 3.2 oz)   SpO2 98%   BMI 34.09 kg/m    HEAD: normocephalic, atraumatic  Face: symmetrical, no swelling, edema, or erythema.  Eyes: pupils unequal  Ears: pinna appear normal b/l  Nose: no anterior drainage  Mouth: Orotracheally intubated. Tongue is edematous and protruding slightly from the mouth. There is significant ecchymosis along the right lateral oral tongue, ventral tongue, and to a lesser degree on the left lateral oral tongue. Tongue is soft to palpation throughout, slightly dry along the tip. A large bite block was placed between the left molars, suture tied around the bite block and secured to cheek with tegaderm  Oropharynx: difficult to visualize around the endotracheal tube, no significant ecchymosis, no blood in oropharynx   Neck: trachea midline  Respiratory: mechanically ventilated  Skin: no rashes or skin lesions of the face/neck  Cardiac: on VA ECMO    ROUTINE IP LABS (Last four results)  [facility]  Recent Labs   Lab 10/05/20  1004 10/05/20  0338 10/04/20  2153 10/04/20  1607   * 149* 150* 149*   POTASSIUM 3.9 3.9 3.7 3.7   CHLORIDE 122* 119* 120* 117*   CARMELO 8.0* 8.1* 8.1* 7.6*   CO2 24 22 23 24   BUN 45* 45* 44* 42*   CR 2.12* 2.16* 2.29* 2.13*   *  146* " 128*  134* 126*  127* 120*  126*     CBC  Recent Labs   Lab 10/05/20  1004 10/05/20  0338 10/04/20  2153 10/04/20  1607   WBC 21.4* 18.6* 18.0* 18.7*   RBC 2.54* 2.26* 2.29* 2.44*   HGB 7.8* 7.0* 7.2* 7.6*   HCT 23.2* 20.5* 20.6* 21.9*   MCV 91 91 90 90   MCH 30.7 31.0 31.4 31.1   MCHC 33.6 34.1 35.0 34.7   RDW 14.5 14.7 14.5 14.2   PLT 76* 78* 68* 73*     INR  Recent Labs   Lab 10/05/20  1004 10/05/20  0338 10/04/20  2153 10/04/20  1607   INR 1.46* 1.55* 1.59* 1.72*       Assessment and Plan  Kathi Rehman is a 45 year old female with a past medical history of cardiac arrest 10/2 s/p PCI with THELMA for LAD occlusion on VA ECMO. ENT consulted for tongue swelling. The oral tongue is edematous and ecchymotic, likely from endotracheal tube compression in the setting of anticoagulation for ECMO, and possibly some trauma from dentition, though there are no puncture/bite marks. No evidence of hematoma or necrosis.     - Alternate bite block between right and left side every 4 hours, ensure suture tied around the bite block is secured to the cheek with tegaderm to avoid posterior migration of the bite block into the airway  - Keep oral tongue moist with good oral cares, saline rinses and apply water based lubricant to protruding portion of the tongue every 4 hours and PRN for dryness. If tongue is drying out despite these measures consider wrapping the protruding tongue in vaseline gauze.  - Remainder of care per primary team    -- Patient and above plan discussed with Dr. Tan.     Maria T De Jesus PA-C  Otolaryngology-Head & Neck Surgery  Please page ENT with questions by dialing [phone] and entering job code [phone] when prompted.

## 2020-10-05 NOTE — PROGRESS NOTES
"Neurocritical Care Note:  S: Intubated and sedated  O:  Pulse 109   Temp 98.4  F (36.9  C) (Bladder)   Resp 16   Ht 1.676 m (5' 6\")   Wt 95.8 kg (211 lb 3.2 oz)   SpO2 97%   BMI 34.09 kg/m      Examined on 100 mcg/h fentanyl and 2 mg/h midazolam.  On exam, Ms Rehman is lying in the supine position.  She does not arouse to verbal, noxious, or tactile stimuli.  Her pupils are symmetric, 2 mm and reactive.  Corneal responses present.  Cough present.  There is no limb response to noxious stimuli.    Imaging and labs personally reviewed in EMR.    A/P:   Ms Rehman is a 45-year-old woman currently admitted for ongoing management after cardiac arrest.  She remains intubated on sedation.  She was rewarmed 10/4.  Since rewarming, her EEG has shown a more attenuated pattern, which is a concerning finding in the setting of hypoxic brain injury.  This finding can be associated with increased cerebral edema. Her initial CTH showed evidence of hypoxic ischemic injury with mild edema.  We recommend a repeat CT head today if she is stable enough to do so. Management of cerebral edema may be difficult in Ms Rehman as she already has renal function issues that may limit availability of mannitol and hypernatremia that may limit usefulness of hypertonic solutions.  - CTH today  -Continue to limit centrally acting and sedating medications  -Avoid hypotonic solutions if you are able as they worsen cerebral edema  -Continue video EEG    Recs discussed with primary team. She was seen and discussed with attending neurointensivist, Dr Trino Kohli,   Neurocritical Care Fellow  Team [facility] *[id number]  10/05/2020    "

## 2020-10-05 NOTE — PROGRESS NOTES
CLINICAL NUTRITION SERVICES - ASSESSMENT NOTE     Nutrition Prescription    RECOMMENDATIONS FOR MDs/PROVIDERS TO ORDER:  -Writer ordered 30 mL q4hr fluid flushes for tube patency. Additional fluids and/or adjustments per MD.      Malnutrition Status:    Unable to determine due to unable to obtain nutrition and wt hx as pt intubated/sedated and no family present.    Recommendations already ordered by Registered Dietitian (RD):  Via OGT:  Impact Peptide @ goal 50 ml/hr (1200 ml/day) to provide 1800 kcals (26 kcal/kg/day), 113 g PRO (1.7 gm/kg/day), 924 ml free H2O, 77 g Fat (50% from MCTs), 168 g CHO and no Fiber daily.  - Once back from ECMO decannulation/OK per MD, initiate @ 10 mL/hr and advance by 10 mL q8hr as tolerated  - Do not start or advance unless lytes (Mg++/K+) >/= WNL and phos>1.9   - Weekly CRP inflammation lab with immunomodulating formula   - 30 mL q4hr fluid flushes for tube patency. Additional fluids and/or adjustments per MD.    - Multivitamin/mineral (15 mL/day via FT) to help ensure micronutrient needs being met with suspected hypermetabolic demands and potential interruptions to TF infusions.  - Aspiration precautions with gastric feeds    Future/Additional Recommendations:  -Monitor tolerance and lytes with advancement to goal TF rate.   -Monitor renal status.     REASON FOR ASSESSMENT  Kathi Rehman is a/an 45 year old female assessed by the dietitian for Provider Order - Registered Dietitian to Assess and Order TF per Medical Nutrition Therapy Protocol    NUTRITION HISTORY  No known allergies per chart. Not previously known to Clinical Nutrition. Unable to obtain from pt d/t intubated/sedated.    CURRENT NUTRITION ORDERS  Diet: NPO    LABS  Labs reviewed  -Na+ 150 (H)  -K+ 3.9 (WNL)  -Phos 3.5 (WNL)  -Mg++ 2.3 (WNL)   -BUN 45 (H)  -Cr 2.12 (H)  -T bili 0.9 (WNL)  -UA ketones 10 (H)  -CRP inflammation 170 (H)    MEDICATIONS  Medications reviewed  -Lasix  -Insulin gtt  -No pressors charted  "today in I/Os    ANTHROPOMETRICS  Height: 167.6 cm (5' 6\"[per RN[)  Most Recent Weight: 95.8 kg (211 lb 3.2 oz)  -- lowest wt this admit  IBW: 59.1 kg   BMI: 34.09 kg/m2; Obesity Grade I BMI 30-34.9  Weight History: No records in Care Everywhere.   Wt Readings from Last 20 Encounters:   10/05/20 95.8 kg (211 lb 3.2 oz)   Dosing Weight: 68 kg (adjusted, based on lowest/driest wt this admit of 95.8 kg and IBW of 59.1 kg)    ASSESSED NUTRITION NEEDS  Estimated Energy Needs: 0947-2878 kcals/day (25 - 30 kcals/kg)  Justification: Maintenance  Estimated Protein Needs: + grams protein/day (1.2 - 1.5+ grams of pro/kg)  Justification: Hypercatabolism with critical illness  Estimated Fluid Needs: 1 mL/kcal   Justification: Maintenance and Per provider pending fluid status    PHYSICAL FINDINGS  See malnutrition section below.  -OGT per CT chest abdomen on 10/2  -VA ECMO  -WOC following for ECMO pressure injury prevention - no PIs    MALNUTRITION  % Intake: Unable to assess  % Weight Loss: Unable to assess  Subcutaneous Fat Loss: None observed  Muscle Loss: None observed - edema somewhat made it difficult to assess  Fluid Accumulation/Edema: Mild-Moderate (2+ anasarca and 3+ head edema per RN flowsheets)  Malnutrition Diagnosis: Unable to determine due to unable to obtain nutrition and wt hx as pt intubated/sedated and no family present.    NUTRITION DIAGNOSIS  Inadequate oral intake related to NPO status and intubated on VA ECMO as evidenced by consult for RD to order enteral nutrition support to meet full nutrition needs at goal rate.     INTERVENTIONS  Implementation  -Nutrition Education: Not appropriate at this time due to patient condition   -Collaboration with other providers: Spoke with CSI regarding nutrition POC, ok to use OGT for TF, but hold off until ECMO decan likely today. Spoke with RN to hold off on starting TF until after ECMO decan today.  -Enteral Nutrition - Initiate  -Feeding tube flush  -Multi-trace " element supplement therapy     Goals  Adv to goal nutrition support within 2-3 days.     Monitoring/Evaluation  Progress toward goals will be monitored and evaluated per protocol.    Jade Peterson RD, LD  Pager: [phone]

## 2020-10-05 NOTE — PROGRESS NOTES
ECMO Shift Summary:      ECMO Equipment:  Console serial number: [id number]  Circuit Lot number: [id number]  Oxygenator Lot number: [id number]      Patient remains on VA ECMO, all equipment is functioning and alarms are appropriately set. RPM's 3400 with flow range 3.43-3.52 L/min. Sweep gas is at 0.5 LPM and FiO2 40%. Circuit remains free of air, clot. Fibrin noted in arterial cannula connector. Cannulas are secure with moderate sanguinous oozing from site. Extremities are warm.     Significant Shift Events: None    Vent settings:  Ventilation Mode: CMV/AC  (Continuous Mandatory Ventilation/ Assist Control)  FiO2 (%): 50 %  Rate Set (breaths/minute): 10 breaths/min  Tidal Volume Set (mL): 400 mL  PEEP (cm H2O): 7 cmH2O  Oxygen Concentration (%): 50 %  Resp: 16  .    Heparin is running at 1050 u/hr. ACT range 158-167. Heparin titrated slowly due to moderate sanguinous oozing from femoral cannula sites.    Urine output is >50 ml/hr, blood loss was moderate from femoral cannula sites. Product given included 1 unit of PRBC.      Intake/Output Summary (Last 24 hours) at 10/5/2020 0438  Last data filed at 10/5/2020 0400  Gross per 24 hour   Intake 1405.31 ml   Output 1883 ml   Net -477.69 ml       ECHO:  No results found for this or any previous visit.No results found for this or any previous visit.    CXR:  Recent Results (from the past 24 hour(s))   XR Chest Port 1 View    Narrative    XR CHEST PORT 1 VW  10/4/2020 5:23 AM      HISTORY: Check endotracheal tube placement and ECLS cannula placement.  DO NOT log-roll patient.  Place film under patient using patient  safety handling process.    COMPARISON: Chest x-ray 10/3/2020    TECHNIQUE: Supine frontal view of the chest    FINDINGS: Significant bilateral airspace opacities with mild  improvement. No pneumothorax or significant pleural effusion. Cardiac  mediastinal silhouette is within normal limits. Trachea is midline.     Endotracheal tube tip projects over the  midthoracic trachea. Inferior  approach ECMO cannula with tip projecting over the mid SVC. Inferior  approach CVC with tip projecting over the right atrium. Intra-aortic  balloon pump marker measures 1.3 cm superior to the thu. NG/OG tube  projects over the stomach.      Impression    IMPRESSION:   1. Mildly improved bilateral patchy airspace opacities, ARDS versus  pulmonary edema versus infection.  2. Stable support devices.    I have personally reviewed the examination and initial interpretation  and I agree with the findings.    DEMETRIUS HESTER MD   Echo Limited    Narrative    [id number]  [id number]  [id number]  [id number]^CHERRI^KAVITA^SEBLE           [facility],[place]  Echocardiography Laboratory  [address] [place] [address]     Name: BRYON SOOD  MRN: [id number]  : 1975  Study Date: 10/04/2020 04:41 PM  Age: 45 yrs  Gender: Female  Patient Location: [facility]  Reason For Study: CAD  Ordering Physician: KAVITA HARLEY  Performed By: EDY Ashley     BSA: 2.0 m2  Height: 66 in  Weight: 211 lb  HR: 102  BP: 110/56 mmHg  _____________________________________________________________________________  __        Procedure  Limited Portable Echo Adult.  _____________________________________________________________________________  __        Interpretation Summary  VA ECMO turndown from 3.4 to 1.5L with the IABP off.     Baseline: Mildly (EF 40-45%) reduced left ventricular function is present.  Apical wall akinesis is present.  With turndown, LV fxn improves to 45-50%.  Right ventricular function, chamber size, wall motion, and thickness are  normal.  No pericardial effusion is present.  Compared to prior, LV fxn is improved.  _____________________________________________________________________________  __        Left Ventricle  Mildly (EF 40-45%) reduced left ventricular function is present. Apical wall  akinesis is present.     Right Ventricle  Right  ventricular function, chamber size, wall motion, and thickness are  normal.     Mitral Valve  The mitral valve is normal.     Aortic Valve  Aortic valve is normal in structure and function.        Tricuspid Valve  The tricuspid valve is normal.     Pulmonic Valve  The pulmonic valve is normal.     Pericardium  No pericardial effusion is present.  _____________________________________________________________________________  __     MMode/2D Measurements & Calculations  IVSd: 1.3 cm  LVIDd: 3.8 cm  LVIDs: 2.7 cm  LVPWd: 1.1 cm  FS: 28.3 %  LV mass(C)d: 158.7 grams  LV mass(C)dI: 77.6 grams/m2  RWT: 0.60              _____________________________________________________________________________  __        Report approved by: Alex Vega 10/04/2020 05:36 PM          Labs:  Recent Labs   Lab 10/05/20  0338 10/05/20  0155 10/05/20  0004 10/04/20  2153   PH 7.43 7.44 7.40 7.40   PCO2 35 33* 37 36   PO2 85 97 81 84   HCO3 23 22 23 23   O2PER 50 50 50 50       Lab Results   Component Value Date    HGB 7.0 (L) 10/05/2020    PHGB <30 10/04/2020    PLT 78 (L) 10/05/2020    FIBR 518 (H) 10/05/2020    INR 1.55 (H) 10/05/2020    PTT 67 (H) 10/05/2020    DD 4.1 (H) 10/05/2020    AXA 0.22 10/05/2020    ANTCH 56 (L) 10/04/2020         Plan is possible decannulation today.      Va Holt RN  ECMO Specialist  10/5/2020 4:38 AM

## 2020-10-05 NOTE — PROGRESS NOTES
ECMO [place] STUDY  10/4/2020      Inotropes/Pressors: none    Flow  MAP HR O2 Sat  MVO2  IABP  3.5 89 101 99  69  1:1   3 83 101 99  69  1:1  2.5 84 102 98  67  1:1  2 84 104 98  67  1:1   1.5 85 104 98  65.9  [place]           Emma Gann, Glad[place] NP

## 2020-10-05 NOTE — PROGRESS NOTES
Cardiology ICU Progress Note    Brief HPI Kathi Rehman is a 45 year old adult female with PMHx of  PCOS presented to [facility] on 10/2/2020 following an out of hospital refractory VF cardiac arrest. Patient was reportedly going to urgent care when she collapsed getting out of the car.  Immediate bystander CPR. Initial rhythm was VF.Brief ROSC for 1 minute--> back into VF. Patient was shocked a total of 7 times, received 1 mg epi and 300 mg amiodarone.  Presented to [facility] Cath Lab approximately 1 hour after arrest on Suraj with asystolic rhythm.  Initial labs notable for PaO2 of 69 with a lactate of 19. Patient was placed on VA ECMO and underwent coronary angiogram which revealed proximal LAD occlusion for which PCI with THELMA 3.5 x 38mm to proximal LAD performed.     Subjective and Interval:   Remains on VA ECMO, intubated, sedated. 1 PRBC overnight. Started on Nicardipine gtt for ongoing HTN.     Assessment and plan by system:   Today's changes:  -- ECMO decann when feasible with CVTS schedule  -- Diurese, 60 mg IV now, likely repeat this afternoon, goal net negative 1 L.   --Head CT post decann if not waking up with decrease in sedation post decannulation.   -- Nutrition consulted to start TF  -- ENT consult for tongue swelling/WOC for tongue abrasion.   -- Start Norethindrone, 0.35 mg given patients age.      Neurology   # Concern for anoxic brain injury    -- Intubated, sedated. Sedation currently at 100 mcg Fentanyl/hour, 2 mg Versed.  -- S/p cooling to 34 degrees. Warm since 10/4 0900.   -- Continue versed, fentanyl for sedation with a RASS goal -3 to -4  -- Neuro-crit consulted and appreciate recommendations.   -- vEEG without seizures, generalized slowing.   -- Palliative care consulted.   -- Head CT with likely hypoxic ischemic injury. Will repeat tomorrow post decann unless waking up.   -- Continue neuro protective strategies like co2 40-50, elevate hob to 30 degrees, avoid hypotonic fluid    * Addendum:  Repeat head CT today with diffuse cerebral edema with sulcal and basal cistern effacement. Infarcts involving the bilateral corpus stratum and to lesser degree thalami. Additional infarcts of the bilateral paramedianright greater than left parietal lobes. Starting hypertonic saline, Mannitol. Repeat Head CT tomorrow. Discussed with  at bedside. Hold on decannulation. Will plan to have a goals of care discussion with him after head CT scan results.      Cardiovascular  # Refractory VF Cardiac arrest 2/2 pLAD lesion s/p PCI and requiring VA ECMO  # CAD s/p PCI to pLAD  # ICM EF 10%  # Dyslipidemia  # Apical wall akinesis- consider OAC prior to discharge.  # HTN- on Nicardipine gtt.  Peripheral V-A ECMO inserted via RCFA and RCFV. Flows 3.5, sweep at 0.5 lpm  TTE: - EF initial 10%-20% large area of apical wall akinesis, RV WNL. Turndown 10/4 with EF improvement to 45% at 1.5 L of flow.  EKG: Low voltage QRS, ST, QTc, 492 ms, ARNIE with reciprocal changes, likely 2/2 evolution of MI and possible aneurysmal anterior wall.   -- Continue ASA 81mg and ticagrelor 90mg BID   -- ACT goal decreased to 160-180  -- Hold lipitor for now given shock liver  -- Hold ACE/ARB for now given reduced renal fxn after arrest  -- Holding beta blocker given shock  -- Lipid panel with LDL 24 HDL 26, Trigs 89  -- Troponin -continues to be >200-->down trending to 101     Pulmonary  # Acute hypoxemic respiratory failure requiring intubation  # Probable aspiration pneumonia   Vent Settings: CMV/10/400/7/70%, AB.42/35/62/22  CXR: bibasilar opacities. ETT 4.7 cm above thu. Unchanged mixed interstitial and airspace opacities -ARDS versus pulmonary edema or infection.  CAP CT with GGO.  -- Minimize vent settings for pulmonary rest on ECMO  -- Wean vent as able  -- Daily CXR  --Q2h ABGs   -- Consider scheduled duonebs if signs of lung disease    -- Peridex and vent bundle    Infectious Disease  # Probable aspiration Pneumonia- in the  setting of VF arrest.  Consolidations and GGO on CT chest.   # Pulmonary congestion on CT/CXR.   # Leukocytosis- WBC 21k  -- Blood, urine, sputum cultures daily.  -- Vancomycin/zosyn per protocol   -- Daily blood cultures.   -- Monitor for signs of infection given cooling, lines, and leukocytosis  - Antibiotics               - Vancomycin 10/2-present               - Zosyn 10/2- present     Renal, Electrolytes  # Acute Renal Injury, likely 2/2  ATN  # Hypoalbuminemia  # Hypernatremic- -- Avoid hypotonic fluid given head CT.  Kidney function improving; Cr 2.12 (from 2.16), UOP 1.9 L yesterday. Net - 500 ml yesterday, additional 1L out since midnight. Net + 7 L since admission.   -- Monitor urine output  -- Keep K > 4 once warm.        Gastrointestinal, Nutrition  # Shock liver 2/2 cardiac arrest- improving  No known medical hx.   CAP CT as below.   -- Monitor LFTs  -- Nutrition consult today to initiate tube feeds  -- Bowel regimen, start ed today  -- GI Prophylaxis: PPI; Protonix 40 mg daily     Hematology  # Acute blood loss anemia  # Thrombocytopenia  # RP hematoma  # Right inguinal subcutaneous hematoma.   Bleeding has stopped via right fem arterial cannulae s/p purse string suture/femstop.   -- Decrease ACT goal to 160-180.   -- Heparin for ECMO  -- ASA/ticagrelor started for THELMA  -- US LE w/ arterial duplex still pending.  -- Carotid artery US pending pending  -- DVT PPX: Heparin as above     Endocrinology  # Hyperglycemia   -- No known medical history.   -- insulin gtt on currently  -- Hgb a1c 5.3    Integumentary:  #Tongue abrasion and swelling, likely secondary to biting tongue with arrest and intubation.   -- ENT consult  -- [facility] nurse following.     Patient seen and discussed with staff physician.    Emma Gann, APRN, CNP  [facility]  Interventional Cardiology-I Service  Pager [phone]     Objective:  Most recent vital signs:  Pulse 109   Temp 98.4  F (36.9  C)  "(Bladder)   Resp 15   Ht 1.676 m (5' 6\")   Wt 95.8 kg (211 lb 3.2 oz)   SpO2 97%   BMI 34.09 kg/m    Temp:  [97.7  F (36.5  C)-98.6  F (37  C)] 98.4  F (36.9  C)  Pulse:  [] 109  Resp:  [13-20] 15  MAP:  [76 mmHg-106 mmHg] 78 mmHg  Arterial Line BP: ()/(11-68) 97/56  FiO2 (%):  [50 %-70 %] 70 %  SpO2:  [92 %-100 %] 97 %  Wt Readings from Last 2 Encounters:   10/05/20 95.8 kg (211 lb 3.2 oz)       Intake/Output Summary (Last 24 hours) at 10/4/2020 1819  Last data filed at 10/4/2020 1800  Gross per 24 hour   Intake 1464.03 ml   Output 1859 ml   Net -394.97 ml         Physical exam:  General: Intubated, sedated, on VA ECMO  HEENT: PERRL, no scleral icterus or injection  CARDIAC: RRR, no m/r/g appreciated. Peripheral pulses 1+  RESP: Coarse bilateral breath sounds, mechanical ventilation, no wheezes  GI: NABS, NT/ND, no guarding or rebound  : Babcock draining clear yellow urine.  EXTREMITIES: VA ECMO cannulas in placed. Mild LE edema, pulses 1+.   SKIN: No acute lesions appreciated, bleeding as above.  NEURO: Intubated and sedated.     Labs (Past three days):  CBC  Recent Labs   Lab 10/05/20  1004 10/05/20  0338 10/04/20  2153 10/04/20  1607   WBC 21.4* 18.6* 18.0* 18.7*   RBC 2.54* 2.26* 2.29* 2.44*   HGB 7.8* 7.0* 7.2* 7.6*   HCT 23.2* 20.5* 20.6* 21.9*   MCV 91 91 90 90   MCH 30.7 31.0 31.4 31.1   MCHC 33.6 34.1 35.0 34.7   RDW 14.5 14.7 14.5 14.2   PLT 76* 78* 68* 73*     BMP  Recent Labs   Lab 10/05/20  1004 10/05/20  0338 10/04/20  2153 10/04/20  1607 10/04/20  0346 10/04/20  0346 10/03/20  0339 10/03/20  0339   * 149* 150* 149*   < > 147*   < > 148*   POTASSIUM 3.9 3.9 3.7 3.7   < > 3.8   < > 3.5   CHLORIDE 122* 119* 120* 117*   < > 118*   < > 119*   CO2 24 22 23 24   < > 21   < > 20   ANIONGAP 4 8 7 8   < > 8   < > 9   *  146* 128*  134* 126*  127* 120*  126*   < > 113*  119*   < > 130*  140*   BUN 45* 45* 44* 42*   < > 36*   < > 29   CR 2.12* 2.16* 2.29* 2.13*   < > 1.96*   < " > 1.47*   GFRESTIMATED 28* 27* 25* 28*   < > 18*   < > 25*   GFRESTBLACK 32* 31* 29* 32*   < > 21*   < > 29*   CARMELO 8.0* 8.1* 8.1* 7.6*   < > 7.8*   < > 7.5*   MAG 2.3 2.2 2.2 2.2   < > 2.2   < > 2.1   PHOS  --  3.5  --   --   --  5.2*  --  2.7    < > = values in this interval not displayed.     Troponins:     INR  Recent Labs   Lab 10/05/20  1004 10/05/20  0338 10/04/20  2153 10/04/20  1607   INR 1.46* 1.55* 1.59* 1.72*     Liver panel  Recent Labs   Lab 10/05/20  1004 10/05/20  0338 10/04/20  2153 10/04/20  1607   PROTTOTAL 5.2* 5.0* 4.7* 4.9*   ALBUMIN 2.4* 2.4* 2.4* 2.4*   BILITOTAL 0.9 0.8 0.9 0.9   ALKPHOS 62 57 51 51   * 278* 288* 314*   * 105* 108* 115*       Imaging/procedure results:  Echo 10/2/2020  Interpretation Summary  VA ECMO.  Severely (EF 10-20%) reduced left ventricular function is present. Large area  of anteroapical akinesis.  Right ventricular function, chamber size, wall motion, and thickness are  normal.  No pericardial effusion is present.  There is no prior study for direct comparison.    Cardiac cath 10/2/2020  Conclusion    --Refractory VF cardiac arrest.  --Successful insertion of peripheral V-A Extracorporeal Membrane Oxygenation - in the right femoral artery and and left femoral vein.  --Successful insertion of a distal antegrade flow cannula in the right SFA.  --Single vessel coronary artery disease without left main involvement.   --Successful deployment of a 3.5x38mm Synergy drug eluting stent to the proximal  LAD.  --Successful insertion of a Zoll Quattro intravenous cooling catheter in the right femoral vein.  --Successful insertion of intra-aortic balloon pump in the left common femoral artery.  --Successful insertion of right radial arterial line for hemodynamic monitoring.          Plan    --Aspirin 81 mg po daily lifelong.  --Ticagrelor 90 mg po bid for at least 1 year uninterrupted.  -- Continue cangrelor for two hours after the administration of ticagrelor  loading dose.   --Bedrest for duration of ECMO therapy.  --Targeted temperature management x 24hrs.  --Continued ECMO and heparin as needed for ACT goal 180-200.  --Epinephrine and vasopressin to maintain MAP>60.  --Admit to the ICU for continued evaluation and management.   Coronary Findings    Diagnostic  Dominance: Right  Left Anterior Descending   Ost LAD to Prox LAD lesion is 100% stenosed. Culprit lesion. The lesion was not previously treated. The stenosis was measured by a visual reading.     Intervention    Ost LAD to Prox LAD lesion   Stent   Lesion length: 30 mm. CATH GUIDING BLUE YELLOW PTFE XB3.5 [id number] guide catheter was successful. The guidewire guidewire crosses lesion There is no pre-interventional antegrade distal flow (LINETTE 0). A STENT SYNERGY DRUG ELUTING 3.92Q76ZZ [id number] drug eluting stent was successfully placed. Pre-stent angioplasty was performed using a CATH BALLOON EMERGE 2.5X15MM [id number] supply. Maximum pressure: 14 skyler. Inflation time: 10 sec. . The strut is apposed. Post-stent angioplasty was performed using a CATH BALLOON NC EMERGE 5.21Z00QG [id number] supply. Maximum pressure: 16 skyler. Inflation time: 10 sec. . The post-interventional distal flow is normal (LINETTE 3). The intervention was successful. The patient experienced the no reflow phenomenon in this vessel. IVUS was performed on the lesion. Ultrasound supply: CATH US OD 5FR OD SEC 2.9FR EAGLE EYE PLATINUM 0.014 [id number]. Cross-sectional area: 19.8 mm . Minimum stent area: 8.3 mm . A second IVUS was performed on the lesion. Ultrasound supply: CATH US OD 5FR OD SEC 2.9FR EAGLE EYE PLATINUM 0.014 [id number].   There is a 0% residual stenosis post intervention.     Extracorporeal Membrane Oxygenation    Extracorporeal Membrane Oxygenation    PROCEDURE REPORT:    EXTRACORPOREAL MEMBRANE OXYGENATION CANNULATION:  A 15Fr arterial ECMO cannula was inserted into the right common femoral artery and a 25Fr venous  cannula was inserted into the left femoral vein.  The arterial cannula was positioned in the iliac artery and the venous canula was positioned across the RA.  The ECMO circuit was primed, closely inspected to ensure no bubbles present and ECMO was initiated.  Right lower extremity perfusion was restored using a 9Fr antegrade perfusion catheter in the R SFA.   Line Placement    Line Placement    Arterial Line was placed. Ultrasound was used to visualize.  Therapeutic Hypothermia    Therapeutic Hypothermia    Therapeutic hypothermia was initiated using thermogard. Details of the thermogard cannula insertion can be found in the central venous catheter insertion section. Goal temperature: 34  C. Successful initiation of therapeutic hypothermia. 6 long replaced with Thermogard       Critical Care Services Progress Note:     Kathi Rehman remains critically ill with acute coronary syndrome, cardiac arrhythmia, severe respiratory distress and shock     I personally examined and evaluated the patient today.   The patient s prognosis today is grave  I have evaluated all laboratory values and imaging studies from the past 24 hours.  Key findings and decisions made today included   -- Diurese, 60 mg IV now, likely repeat this afternoon, goal net negative 1 L.   --Head CT post decann if not waking up with decrease in sedation post decannulation.   -- Nutrition consulted to start TF  -- ENT consult for tongue swelling/WOC for tongue abrasion.   -- Start Norethindrone, 0.35 mg given patients age.   I personally managed the ventilator, sedation, pain control and analgesia, metabolic abnormalities, antibiotic therapy, nutritional status and vasoactive medications.   Consults ongoing and ordered are none  Procedures that will happen today are: none  All treatments were placed at my direction.  I formulated today s plan with the house staff team or resident(s) and agree with the findings and plan in the associated note.       The  above plans and care have been discussed with none and all questions and concerns were addressed.     I spent a total of 60 minutes (excluding procedure time) personally providing and directing critical care services at the bedside and on the critical care unit for Kathi Rehman.        Tyshawn Hays MD        ECMO Attending Progress Note  10/05/2020    Kathi Rehman is a 45 year old female who was cannulated for ECMO due to V.Fib arrest and MI    Cannulation Site:  17 Fr in the R femoral artery  25 Fr in the L femoral vein    Interval events:     -- ECMO decann when feasible with CVTS schedule     ECMO Issues including assessments and plan on DOS 10/05/2020:  Neuro: Sedated for mechanical ventilation and ECMO.  No acute distress.  NIRS stable.  CV: Cardiogenic shock.  Hemodynamically stable  Pulm: Keep vent settings at rest settings as above.  FEN/Renal: Electrolytes stable w/ replacement protocols in place, Cr stable, UOP stable  Heme: ACT goal: 180-200, Minimal oozing around the ECMO cannulas.  ID: Receiving empiric antibiotics  Cannulae: Position is acceptable on exam and the available imaging.  Distal perfusion cannula is in place and patent.  Extremities are well-perfused.     I have personally reviewed the ECMO flows, oxygenation and CO2 clearance, anticoagulation, and cannula position.  I have also personally assessed the patient's systemic response with hemodynamics, oxygenation, ventilation, and bleeding.       The patient requires continued ECMO support and management in the ICU.       Tyshawn Hays MD

## 2020-10-05 NOTE — PROGRESS NOTES
"  ECMO Shift Summary:        ECMO Equipment:  Console serial number: [id number]  Circuit Lot number: [id number]  Oxygenator Lot number: [id number]      Patient remains on VA ECMO, all equipment is functioning and alarms are appropriately set. RPM's 3400 with flow range 3.5-3.6 L/min. Sweep gas is at 0.5 LPM and FiO2 60%. Circuit remains free of air, clot and fibrin. Cannulas are secure with no bleeding from site. Extremities are perfused.     Significant Shift Events: CT head scan; see CT exam notes    Vent settings:  Ventilation Mode: CMV/AC  (Continuous Mandatory Ventilation/ Assist Control)  FiO2 (%): 40 %  Rate Set (breaths/minute): 10 breaths/min  Tidal Volume Set (mL): 400 mL  PEEP (cm H2O): 7 cmH2O  Oxygen Concentration (%): 70 %  Resp: 14  .    Heparin is running at 1100 u/hr, ACT range 154-166.    Blood loss was small. Product given included: 1 PRBC.      Intake/Output Summary (Last 24 hours) at 10/5/2020 1726  Last data filed at 10/5/2020 1700  Gross per 24 hour   Intake 2653.97 ml   Output 3738 ml   Net -1084.03 ml       ECHO:  No results found for this or any previous visit.No results found for this or any previous visit.    CXR:  Recent Results (from the past 24 hour(s))   XR Chest Port 1 View    Narrative    EXAM: XR CHEST PORT 1 VW  10/5/2020 2:04 AM     HISTORY:  Check endotracheal tube placement and ECLS cannula  placement. DO NOT log-roll patient.  Place film under patient using  patient safety handling process.       COMPARISON:  Chest x-ray 10/4/2020.    FINDINGS: AP radiograph of the chest. Endotracheal tube projecting 4.7  cm cephalad to the thu. Intra-aortic balloon pump marker at the  level of the thu. Inferior approach\" cannula with the tip in the  mid SVC. Stable positioning of left basilar chest tube. Partial  visualization of enteric tube with the tip and side port outside of  the field of view. No significant change in patchy mixed interstitial  and airspace opacities including " retrocardiac consolidative opacities.  No pneumothorax or significant pleural effusion. The visualized upper  abdomen is unremarkable. No acute osseous abnormality.      Impression    IMPRESSION:  1. Endotracheal tube projecting 4.7 cm cephalad to the thu. Stable  positioning of additional supportive devices.  2. Unchanged mixed interstitial and airspace opacities throughout the  lungs representing ARDS versus pulmonary edema or infection.    I have personally reviewed the examination and initial interpretation  and I agree with the findings.    AGNES BLUNT MD   CT Head w/o Contrast   Result Value    Radiologist flags Diffuse cerebral edema with multifocal infarcts (AA)    Narrative    CT HEAD W/O CONTRAST 10/5/2020 2:36 PM    Provided History: s/p Cardiac arrest, on ECMO, pupils fixed/dilated    Comparison: CT head 10/2/2020.    Technique: Using multidetector thin collimation helical acquisition  technique, axial, coronal and sagittal CT images from the skull base  to the vertex were obtained without intravenous contrast.     Findings:    There is diffuse loss of the gray-white differentiation. Significant  hypodensities are present in the bilateral basal ganglia, most  pronounced involving the bilateral corpus striatum. There is  additional involvement of the bilateral thalami, the bilateral  paramedian posterior parietal lobes and the posteroinferior temporal  lobes.    There is no hydrocephalus. Moderate effacement of the fourth  ventricle. Basal cisterns are effaced.    There is no depressed calvarial fracture. Air-fluid levels noted in  paranasal sinuses and mastoid air cells.       Impression    Impression:   Findings of diffuse cerebral edema with sulcal and basal cistern  effacement. Infarcts involving the bilateral corpus stratum and to  lesser degree thalami. Additional infarcts of the bilateral paramedian  right greater than left parietal lobes.    [Critical Result: Diffuse cerebral edema with  multifocal infarcts]    Finding was identified on 10/5/2020 2:45 PM.     Dr. Conway was contacted by Dr. Vasquez at 10/5/2020 2:55 PM and  verbalized understanding of the critical finding.     I have personally reviewed the examination and initial interpretation  and I agree with the findings.    DIMITRI MARCUS MD       Labs:  Recent Labs   Lab 10/05/20  1612 10/05/20  1438 10/05/20  1206 10/05/20  1004   PH 7.37 7.39 7.39 7.41   PCO2 41 41 40 37   PO2 116* 68* 67* 86   HCO3 24 25 24 23   O2PER 60  70  70 70 70 70       Lab Results   Component Value Date    HGB 6.5 (LL) 10/05/2020    PHGB <30 10/05/2020    PLT 65 (L) 10/05/2020    FIBR 483 (H) 10/05/2020    INR 1.55 (H) 10/05/2020    PTT 68 (H) 10/05/2020    DD 3.9 (H) 10/05/2020    AXA 0.17 10/05/2020    ANTCH 50 (L) 10/05/2020         Plan is to continue VA ECMO.      Eugene Murphy  ECMO Specialist  10/5/2020 5:26 PM

## 2020-10-05 NOTE — PHARMACY-VANCOMYCIN DOSING SERVICE
Pharmacy Vancomycin Note  Date of Service 2020  Patient's  1975   45 year old, female    Indication: Aspiration Pneumonia  Goal Trough Level: 15-20 mg/L  Day of Therapy: 2  Current Vancomycin regimen:  intermittent    Current estimated CrCl = Estimated Creatinine Clearance: 39.8 mL/min (A) (based on SCr of 2.13 mg/dL (H)).    Creatinine for last 3 days  10/2/2020:  3:56 PM Creatinine 0.86 mg/dL;  9:54 PM Creatinine 1.27 mg/dL  10/3/2020:  3:39 AM Creatinine 1.47 mg/dL; 10:41 AM Creatinine 1.70 mg/dL;  4:11 PM Creatinine 1.76 mg/dL; 10:02 PM Creatinine 1.81 mg/dL  10/4/2020:  3:46 AM Creatinine 1.96 mg/dL; 10:14 AM Creatinine 2.12 mg/dL;  4:07 PM Creatinine 2.13 mg/dL    Recent Vancomycin Levels (past 3 days)  10/4/2020:  6:12 PM Vancomycin Level 12.3 mg/L    Vancomycin IV Administrations (past 72 hours)                   vancomycin 1500 mg in 0.9% NaCl 250 ml intermittent infusion 1,500 mg (mg) 1,500 mg Given 10/03/20 1953    vancomycin (VANCOCIN) 1,750 mg in sodium chloride 0.9 % 500 mL intermittent infusion (mg) 1,750 mg New Bag 10/02/20 2106                Nephrotoxins and other renal medications (From now, onward)    Start     Dose/Rate Route Frequency Ordered Stop    10/04/20 2000  vancomycin (VANCOCIN) 1,750 mg in sodium chloride 0.9 % 250 mL intermittent infusion      1,750 mg (central catheter)  over 60 Minutes Intravenous ONCE 10/04/20 1942      10/04/20 1210  vancomycin place bergeron - receiving intermittent dosing      1 each Intravenous SEE ADMIN INSTRUCTIONS 10/04/20 1211 10/07/20 1209    10/02/20 1700  piperacillin-tazobactam (ZOSYN) 3.375 g vial to attach to  mL bag      3.375 g  over 30 Minutes Intravenous EVERY 6 HOURS 10/02/20 1554 10/07/20 1659    10/02/20 1600  phenylephrine (NOAH-SYNEPHRINE) 50 mg in sodium chloride 0.9 % 250 mL infusion      0.5-6 mcg/kg/min × 90 kg (Dosing Weight)  13.5-162 mL/hr  Intravenous CONTINUOUS 10/02/20 1554      10/02/20 1600  vasopressin  40 units in NS 40 mL (PITRESSIN) infusion      0.5-4 Units/hr  0.5-4 mL/hr  Intravenous CONTINUOUS 10/02/20 1554      10/02/20 1554  norepinephrine (LEVOPHED) 16 mg in  mL infusion      0.03-0.4 mcg/kg/min × 90 kg (Dosing Weight)  2.5-33.8 mL/hr  Intravenous CONTINUOUS PRN 10/02/20 1554               Contrast Orders - past 72 hours (72h ago, onward)    Start     Dose/Rate Route Frequency Ordered Stop    10/02/20 1404  iopamidol (ISOVUE-370) solution  Status:  Discontinued        ONCE PRN 10/02/20 1404 10/02/20 1551          Interpretation of levels and current regimen:  Trough level is  Subtherapeutic @ 12.3 mg/L    Has serum creatinine changed > 50% in last 72 hours: No  Urine output:  good urine output  Renal Function: Stable    Plan:  1.  Redose this evening with Vancomycin 1750 mg iv x 1, then continue intermittent dosing  2.  Pharmacy will check trough levels as appropriate in 1-3 Days.    3. Serum creatinine levels will be ordered daily for the first week of therapy and at least twice weekly for subsequent weeks.      Crystal Palacios, PharmD        .

## 2020-10-05 NOTE — PROGRESS NOTES
ECMO Shift Summary:    ECMO Equipment:  Console Serial #: [id number]  Circuit Lot #: [id number]  Oxygenator Lot #: [id number]    Patient remains on VA ECMO, all equipment is functioning and alarms are appropriately set. RPM's 3400 with flow range ~3.5 L/min. Sweep gas is at 0.5 LPM and FiO2 40%. Circuit remains free of visible air, clot and fibrin. Cannulas are secure with oozing from the bilateral fem sites, right (arterial) > left. Extremities are warm to the touch.    Significant Shift Events:   Turndown at bedside with Echo-see NP note.    Vent settings:  CMV 10/400/+7/50%.  Suctioned ETT for small bloody secretions.    Heparin is running at 850 unit(s)/hr (14.3 u/kg/hr), ACT range 145-162.    Urine output averaged 55 mls/hr.  Blood loss was appreciated from cannula sites and ETT. Product was not given.      Intake/Output Summary (Last 24 hours) at 10/4/2020 2232  Last data filed at 10/4/2020 2200  Gross per 24 hour   Intake 1442.85 ml   Output 1889 ml   Net -446.15 ml       ECHO:  No results found for this or any previous visit.No results found for this or any previous visit.    CXR:  Recent Results (from the past 24 hour(s))   XR Chest Port 1 View    Narrative    XR CHEST PORT 1 VW  10/4/2020 5:23 AM      HISTORY: Check endotracheal tube placement and ECLS cannula placement.  DO NOT log-roll patient.  Place film under patient using patient  safety handling process.    COMPARISON: Chest x-ray 10/3/2020    TECHNIQUE: Supine frontal view of the chest    FINDINGS: Significant bilateral airspace opacities with mild  improvement. No pneumothorax or significant pleural effusion. Cardiac  mediastinal silhouette is within normal limits. Trachea is midline.     Endotracheal tube tip projects over the midthoracic trachea. Inferior  approach ECMO cannula with tip projecting over the mid SVC. Inferior  approach CVC with tip projecting over the right atrium. Intra-aortic  balloon pump marker measures 1.3 cm superior to the  thu. NG/OG tube  projects over the stomach.      Impression    IMPRESSION:   1. Mildly improved bilateral patchy airspace opacities, ARDS versus  pulmonary edema versus infection.  2. Stable support devices.    I have personally reviewed the examination and initial interpretation  and I agree with the findings.    DEMETRIUS HESTER MD   Echo Limited    Narrative    [id number]  [id number]  [id number]  [id number]^CHERRI^KAVITA^SEBLE           [facility],[place]  Echocardiography Laboratory  [address] [place] [address]     Name: BRYON SOOD  MRN: [id number]  : 1975  Study Date: 10/04/2020 04:41 PM  Age: 45 yrs  Gender: Female  Patient Location: [facility]  Reason For Study: CAD  Ordering Physician: KAVITA HARLEY  Performed By: EDY Ashley     BSA: 2.0 m2  Height: 66 in  Weight: 211 lb  HR: 102  BP: 110/56 mmHg  _____________________________________________________________________________  __        Procedure  Limited Portable Echo Adult.  _____________________________________________________________________________  __        Interpretation Summary  VA ECMO turndown from 3.4 to 1.5L with the IABP off.     Baseline: Mildly (EF 40-45%) reduced left ventricular function is present.  Apical wall akinesis is present.  With turndown, LV fxn improves to 45-50%.  Right ventricular function, chamber size, wall motion, and thickness are  normal.  No pericardial effusion is present.  Compared to prior, LV fxn is improved.  _____________________________________________________________________________  __        Left Ventricle  Mildly (EF 40-45%) reduced left ventricular function is present. Apical wall  akinesis is present.     Right Ventricle  Right ventricular function, chamber size, wall motion, and thickness are  normal.     Mitral Valve  The mitral valve is normal.     Aortic Valve  Aortic valve is normal in structure and function.        Tricuspid Valve  The tricuspid valve  is normal.     Pulmonic Valve  The pulmonic valve is normal.     Pericardium  No pericardial effusion is present.  _____________________________________________________________________________  __     MMode/2D Measurements & Calculations  IVSd: 1.3 cm  LVIDd: 3.8 cm  LVIDs: 2.7 cm  LVPWd: 1.1 cm  FS: 28.3 %  LV mass(C)d: 158.7 grams  LV mass(C)dI: 77.6 grams/m2  RWT: 0.60              _____________________________________________________________________________  __        Report approved by: Alex Vega 10/04/2020 05:36 PM          Labs:  Recent Labs   Lab 10/04/20  2153 10/04/20  2003 10/04/20  1812 10/04/20  1607   PH 7.40 7.39 7.41 7.41   PCO2 36 37 35 36   PO2 84 83 84 86   HCO3 23 23 22 22   O2PER 50 50 50 50       Lab Results   Component Value Date    HGB 7.2 (L) 10/04/2020    PHGB <30 10/04/2020    PLT 68 (L) 10/04/2020    FIBR 478 (H) 10/04/2020    INR 1.59 (H) 10/04/2020    PTT 59 (H) 10/04/2020    DD 5.7 (H) 10/04/2020    AXA 0.10 10/04/2020    ANTCH 56 (L) 10/04/2020         Plan is to continue VA ECMO o/n and decannulate tomorrow.    Keesha Urias, RT  ECMO Specialist  10/4/2020 10:32 PM

## 2020-10-05 NOTE — PLAN OF CARE
Unequal pupil reaction noted around 1400. CSI notified. Went down to CT. Awaiting results. See flowsheets for pupil changes. V-2 Fent-100. Afebrile to time. VSS. See flowsheets for Nipride titration. ECMO Flows- 3 Sw- 0.5 Fi02-60%. -120s. IABP 1:1 Aug 100%. CMV 10:400:7:70% Lungs diminished. See flowsheets for UOP. Lasix given as ordered. See flowsheets for blood sugars. No insulin restarted to time. Discussion of starting gastric feeds upon knowledge of possible decannulation time.     ENT to assess tongue swelling. Current bite block to be changed position every 4 hours with frequent oral cares.     [facility] call initiated. Referral number #[id number]

## 2020-10-05 NOTE — PHARMACY-CONSULT NOTE
Pharmacy Tube Feeding Consult    Medication reviewed for administration by feeding tube and for potential food/drug interactions.    Recommendation: No changes are needed at this time.     Pharmacy will continue to follow as new medications are ordered.    Ken DobbinsD  CVICU and Advanced Heart Failure Pharmacist  Pager [phone]

## 2020-10-06 NOTE — PROGRESS NOTES
Major Shift Events:  Stable on Nicardipine overnight for MAP goal less than 85. No changes to neuro status. Pupils still fixed and 2-3. No cough, no gag.  Fentanyl and Versed gtt stoppd as patient is still unresponsive. 3% sodium continues for goal NA of 155-160. Currently 154. Trending up. Urine output adequate overnight 125-200cc/hr.    Plan: repeat CT this afternoon to assess neuro exam.     For vital signs and complete assessments, please see documentation flowsheets.

## 2020-10-06 NOTE — PROGRESS NOTES
Critical Care Cardiology   Progress Note  Kathi Rehman MRN: [id number]  Age: 45 year old, : 1975  Date: 10/6/2020      History of Present Illness   Kathi Rehman is a 45 year old female with PMHx of PCOS presented to [facility] on 10/2/2020 following out of hospital refractory VF cardiac arrest. Patient was reportedly going to urgent care when she collapsed getting out of the car. Clinic staff performed immediate bystander CPR. Initial rhythm was VF. Brief ROSC for 1 minute but converted to VF again. Patient was shocked a total of 7 times, received 1 mg epi, and 300 mg amiodarone. She was transferred to [facility] CCL approximately 1 hour after arrest on CARMINA and in asystole. Initial labs notable for PCO2 61, PO2 69, andlactate of 19. Patient was placed on VA ECMO and underwent coronary angiogram which revealed thrombotic occlusion of the proximal LAD with minimal CAD otherwise. Pt underwent PCI w/ THELMA of pLAD. Cooling catheter was placed for TTM and pt was transferred to CVICU for post-arrest care.      Subjective/Interval Events   Repeat CTH yesterday for fixed and dilated pupils which showed diffuse edema, sulcal and basal cistern effacement, and scattered infarcts. Pt remains unresponsive and no cough or gag reflex. Off sedation since [place]. Moderate bleeding from cannulae sites (R>L); hgb stable and did not require blood products overnight.     Assessment and Plan   Today's plan:  -repeat CTH at 1300   -palliative care consult   -3% Na infusion to keep Na 155-160  -nicardipine infusion to keep MAP <85   ------------------  Neurology:   Anoxic brain injury w/ diffuse cerebral edema and sulcal/basal cistern effacement   Scattered infarcts  Intubated. Cooled to initially to 34 degrees; normothermic since 10/4 at 0900. Pupils ~2 mm, non-reactive. Pt is unresponsive.  Imaging/procedures:   CTH 10/5: Findings of diffuse cerebral edema with sulcal and basal cistern effacement. Infarcts involving the bilateral corpus  stratum and to lesser degree thalami. Additional infarcts of the bilateral paramedian right greater than left parietal lobes.   EEG 10/5: worsening encephalopathy, no seizures   Sedation/paralytics:   None  Plan:   -repeat CTH at 1300   -Neurocritical Care following      Cardiovascular:   OOHCA   Thrombotic occlusion of pLAD s/p PCI w/ THELMA   Ischemic cardiomyopathy (EF 45-50%)  Refractory VF arrest s/p peripheral VA ECMO insertion w/ distal reperfusion catheter. Thrombotic occlusion of pLAD w/ minimal CAD otherwise; s/p PCI w/ THELMA of pLAD.  MCS:   VA ECMO 3./0.5/100% - ACT goal: 160-180  IABP 1:1 - pulsatility when paused   Imaging/procedures:   Coronary angiogram 10/2: thrombotic occlusion of pLAD w/ minimal CAD otherwise; s/p PCI w/ THELMA of pLAD  TTE 10/4: VA ECMO turndown from 3.4 to 1.5L with the IABP off. Baseline: Mildly (EF 40-45%) reduced left ventricular function is present. Apical wall akinesis is present. With turndown, LV fxn improves to 45-50%. Right ventricular function, chamber size, wall motion, and thickness are normal. No pericardial effusion is present. Compared to prior, LV fxn is improved.  EKG 10/6: ST 100s, anterolateral Q waves w/ persistent Lottie, inferior ST depression, QTc 482   Vasoactive/antiarrhythmic infusions:   Nicardipine    Plan:   -drawing ABGs from RRA  -wean nicardipine as able  -ACT goal 160-180  -continue ASA and ticagrelor  -hold ACE/ARB given ROGERS  -hold beta blocker given shock   -hold statin given hepatic injury after arrest    Pulmonary:  Acute hypoxic respiratory failure   Aspiration pneumonia   Vent settings:   CMV 10/400/7/70% - AB.47/32/65/23  Imaging/procedures:   CXR 10/6: Edema L>R. Lines in stable position.   CT chest 10/2: Small bilateral pleural effusions with consolidative opacities in the dependent lung fields with diffuse scattered nodular ground glass opacities and smooth interlobular septal thickening. Overall findings suggestive of pulmonary edema  underlying infection is not entirely  excluded.  Plan:   -wean vent as able  -daily CXR  -Q2h ABGs for now  -consider scheduled duonebs if signs of lung dz, currently PRN     GI and Nutrition:   Acute liver injury, improving   Imaging/procedures:   CT abd/pelvis 10/2: Relatively featureless-appearing bowel which is a nonspecific finding but can be seen in patient's with shock bowel. No pneumatosis, portal venous gas, or pneumoperitoneum.   Plan:   -monitor BID LFTs  -NPO for now  -continue bowel regimen   -GI prophylaxis: protonix     Renal, Fluid, and Electrolytes:   Acute kidney injury  Permissive hypernatremia   Benign right adrenal adenoma on CT   Received mannitol yesterday after CTH and was started on 3% Na infusion to keep Na 155-160.  Creatinine 2.33, BUN 46. 4.8L UOP yesterday and net negative 1.8L.   Plan:   -3% Na infusion to keep Na 155-160  -avoid hypotonic solutions  -monitor urine output  -maintain K>3 and Mg>2    Infectious Disease:   Aspiration pneumonia   Leukocytosis   WBC 19.6; afebrile. No signs of infection. Leukocytosis c/w arrest. Blood and sputum cultures collected - NGTD.   Plan:   -vancomycin/zosyn x5 days for aspiration pneumonia   -daily blood cultures while on ECMO   -monitor for signs of infection given cooling, lines, and leukocytosis    Hematology and Oncology:   Acute blood loss anemia   Thrombocytopenia   Right RP hematoma   Right inguinal subcutaneous hematoma   Hgb 7.9, plt 82. INR 1.4. Receiving heparin for ECMO and ASA/ticagrelor for THELMA.   Plan:   -heparin gtt for ECMO with ACT goal 160-180  -cryo PRN for fibrinogen < 200; FFP for INR >2  -transfuse for hgb <8  -US LE w/ arterial duplex per ECMO protocol   -DVT prophylaxis: heparin gtt    Endocrine:   Hyperglycemia   No known medical history. BG elevated in setting of critical illness.  Plan:   -insulin gtt  -hgb A1c: 5.3%    Integumentary   Tongue abrasion and swelling, likely secondary to biting tongue with arrest and  "intubation.   -ENT consulted; orders for bite block and keep oral   -WOC nurse following    Lines:   R femoral arterial ECMO cannula October 2, 2020  L femoral venous ECMO cannula October 2, 2020   L femoral arterial line (IABP) October 2, 2020  R femoral venous line (Thermogard) October 2, 2020  R radial arterial line October 2, 2020  ETT October 2, 2020  Babcock catheter October 2, 2020  OG tube October 2, 2020  Restraint: not needed    Current lines are required for patient management      Family update by me today: Yes     Code Status: Full code    The pt was discussed and evaluated with Dr. Pfeiffer, attending physician, who agrees with the assessment and plan above.     Rosa Clay, DNP APRN [facility]   Critical Care Cardiology   [phone]     Objective     Pulse 106   Temp 98.8  F (37.1  C) (Bladder)   Resp 10   Ht 1.676 m (5' 6\")   Wt 95.8 kg (211 lb 3.2 oz)   SpO2 96%   BMI 34.09 kg/m    Temp:  [98.2  F (36.8  C)-98.8  F (37.1  C)] 98.8  F (37.1  C)  Pulse:  [] 106  Resp:  [10-17] 10  MAP:  [72 mmHg-133 mmHg] 133 mmHg  Arterial Line BP: ()/() 181/172  FiO2 (%):  [40 %-70 %] 70 %  SpO2:  [92 %-100 %] 96 %  Wt Readings from Last 2 Encounters:   10/05/20 95.8 kg (211 lb 3.2 oz)     I/O last 3 completed shifts:  In: 3205.28 [I.V.:2195.28; NG/GT:210; IV Piggyback:200]  Out: 4858 [Urine:4708; Emesis/NG output:150]    GENERAL APPEARANCE: Intubated. Unresponsive.   HEENT: No icterus, pupils 2 mm and non-reactive, ETT in place, OG tube in place.  CARDIOVASCULAR: tachycardic, normal S1/S2, no S3/S4 and no murmur, click, or rub. Normal PMI. Dorsalis pedis pulses.  RESP: Coarse bilaterally. Mechanical ventilation.    GASTRO: Abdomen soft, bowel sounds hypoactive but present.  GENITOURINARY: Babcock in place.  EXTREMITIES: Cool, +1 edema, DP pulses dopplerable. VA ECMO cannulas in right and left groin, ThermoGard in right groin, and IABP in left groin.   NEURO: Intubated. Pupils equal, 2 mm, " non-reactive. Unresponsive.   INTEGUMENTARY: No rashes. Cannula/Line sites CDI.  LINES/TUBES/DRAINS: VA ECMO cannulas R and L groin, IABP L groin, and ThermoGard in R groin. R radial arterial line. ETT. OG. Babcock Catheter.     Data     Vent Settings:  Resp: 10 SpO2: 96 % O2 Device: Mechanical Ventilator      Arterial Blood Gas:   Recent Labs   Lab 10/06/20  0601 10/06/20  0346 10/06/20  0207 10/05/20  2349   PH 7.47* 7.46* 7.45 7.43   PCO2 32* 33* 35 36   PO2 65* 89 83 59*   HCO3 23 24 24 24   O2PER 70.0 70.0 70.0 70.0       Vitals:    10/03/20 0530 10/04/20 0500 10/05/20 0200   Weight: 97.9 kg (215 lb 13.3 oz) 96.1 kg (211 lb 13.8 oz) 95.8 kg (211 lb 3.2 oz)   I/O last 3 completed shifts:  In: 3205.28 [I.V.:2195.28; NG/GT:210; IV Piggyback:200]  Out: 4858 [Urine:4708; Emesis/NG output:150]  Recent Labs   Lab 10/06/20  0346 10/05/20  2349 10/05/20  2204   * 153* 151*   POTASSIUM 3.7  --  3.9   CHLORIDE 125*  --  121*   CO2 25  --  24   ANIONGAP 4  --  5   *  163*  --  140*  145*   BUN 46*  --  43*   CR 2.33*  --  2.33*   CARMELO 8.1*  --  8.0*     No components found for: URINE   Recent Labs   Lab 10/06/20  0346 10/05/20  2204 10/05/20  1612   * 188* 182*   ALT 85* 90* 80*   BILITOTAL 1.0 1.0 0.7   ALBUMIN 2.4* 2.3* 2.0*   PROTTOTAL 5.3* 5.2* 4.4*   ALKPHOS 78 73 57     Temp: 98.8  F (37.1  C) Temp src: BladderTemp  Min: 98.2  F (36.8  C)  Max: 98.8  F (37.1  C)   Recent Labs   Lab 10/06/20  0346 10/05/20  2204 10/05/20  1612 10/05/20  1004 10/05/20  0338   WBC 19.6* 19.2* 16.6* 21.4* 18.6*   HGB 7.9* 8.0* 6.5* 7.8* 7.0*   HCT 24.2* 23.8* 19.5* 23.2* 20.5*   MCV 92 91 92 91 91   RDW 14.7 14.6 14.6 14.5 14.7   PLT 82* 72* 65* 76* 78*     Recent Labs   Lab 10/06/20  0346 10/05/20  2204 10/05/20  1612 10/05/20  1004 10/05/20  0338   INR 1.36* 1.38* 1.55* 1.46* 1.55*   PTT 66* 65* 68* 70* 67*     Recent Labs   Lab 10/06/20  0346 10/05/20  2204 10/05/20  1612 10/05/20  1004 10/05/20  0338   *   163* 140*  145* 134*  140* 140*  146* 128*  134*       All imaging personally reviewed:  Recent Results (from the past 24 hour(s))   CT Head w/o Contrast   Result Value    Radiologist flags Diffuse cerebral edema with multifocal infarcts (AA)    Narrative    CT HEAD W/O CONTRAST 10/5/2020 2:36 PM    Provided History: s/p Cardiac arrest, on ECMO, pupils fixed/dilated    Comparison: CT head 10/2/2020.    Technique: Using multidetector thin collimation helical acquisition  technique, axial, coronal and sagittal CT images from the skull base  to the vertex were obtained without intravenous contrast.     Findings:    There is diffuse loss of the gray-white differentiation. Significant  hypodensities are present in the bilateral basal ganglia, most  pronounced involving the bilateral corpus striatum. There is  additional involvement of the bilateral thalami, the bilateral  paramedian posterior parietal lobes and the posteroinferior temporal  lobes.    There is no hydrocephalus. Moderate effacement of the fourth  ventricle. Basal cisterns are effaced.    There is no depressed calvarial fracture. Air-fluid levels noted in  paranasal sinuses and mastoid air cells.       Impression    Impression:   Findings of diffuse cerebral edema with sulcal and basal cistern  effacement. Infarcts involving the bilateral corpus stratum and to  lesser degree thalami. Additional infarcts of the bilateral paramedian  right greater than left parietal lobes.    [Critical Result: Diffuse cerebral edema with multifocal infarcts]    Finding was identified on 10/5/2020 2:45 PM.     Dr. Conway was contacted by Dr. Vasquez at 10/5/2020 2:55 PM and  verbalized understanding of the critical finding.     I have personally reviewed the examination and initial interpretation  and I agree with the findings.    DIMITRI MARCUS MD          Medications     Current Facility-Administered Medications   Medication     albumin human 5 % injection 50 g      albumin human 5 % injection 500 mL     artificial tears ophthalmic ointment     aspirin (ASA) chewable tablet 81 mg     calcium chloride in  mL intermittent infusion 1 g     dextrose 10% infusion     dextrose 10% infusion     glucose gel 15-30 g    Or     dextrose 50 % injection 25-50 mL    Or     glucagon injection 1 mg     fentaNYL (SUBLIMAZE) bolus from infusion pump-ADULT 50 mcg     fentaNYL (SUBLIMAZE) infusion     heparin 2 unit/mL in 0.9% NaCl (for REPERFUSION CATHETER)     heparin 25,000 units in 0.45% NaCl 250 mL ANTICOAGULANT  infusion     influenza quadrivalent (PF) vacc (FLUZONE) injection 0.5 mL     insulin 1 unit/mL in saline (NovoLIN, HumuLIN Regular) drip - ADULT IV Infusion     lactated ringers BOLUS 1,000 mL     lidocaine (LMX4) cream     lidocaine 1 % 0.1-1 mL     magnesium sulfate 2 g in water intermittent infusion     magnesium sulfate 4 g in 100 mL sterile water (premade)     Medication Considerations - To maintain platelet inhibition after discontinuation of cangrelor (KENGREAL) [see admin instructions]     midazolam (VERSED) drip - ADULT 100 mg/100 mL in NS PRE-MIX     midazolam (VERSED) injection 1-3 mg     multivitamins w/minerals (CERTAVITE) liquid 15 mL     naloxone (NARCAN) injection 0.1-0.4 mg     niCARdipine 40 mg in 200 mL 0.9% NaCl (CARDENE) infusion     norepinephrine (LEVOPHED) 16 mg in  mL infusion     norethindrone (MICRONOR) 0.35 MG per tablet 0.35 mg (CRUSH in CHEMO ROOM)     pantoprazole (PROTONIX) IV push injection 40 mg     piperacillin-tazobactam (ZOSYN) 3.375 g vial to attach to  mL bag     polyethylene glycol (MIRALAX) Packet 17 g     potassium chloride (KLOR-CON) Packet 20-40 mEq     potassium chloride 10 mEq in 100 mL intermittent infusion with 10 mg lidocaine     potassium chloride 10 mEq in 100 mL sterile water intermittent infusion (premix)     potassium chloride 20 mEq in 50 mL intermittent infusion     potassium chloride ER (KLOR-CON M) CR tablet  20-40 mEq     senna-docusate (SENOKOT-S/PERICOLACE) 8.6-50 MG per tablet 1 tablet     sodium chloride (PF) 0.9% PF flush 3 mL     sodium chloride (PF) 0.9% PF flush 3 mL     sodium chloride 3% infusion     sodium phosphate 10 mmol in D5W intermittent infusion     sodium phosphate 15 mmol in D5W intermittent infusion     sodium phosphate 20 mmol in D5W intermittent infusion     sodium phosphate 25 mmol in D5W intermittent infusion     ticagrelor (BRILINTA) tablet 90 mg     vancomycin (VANCOCIN) 1,750 mg in sodium chloride 0.9 % 250 mL intermittent infusion

## 2020-10-06 NOTE — PROGRESS NOTES
"Otolaryngology Progress Note  October 6, 2020    S:  Afebrile (Tm 98.8), mechanically ventilated and on ECMO. CT performed yesterday demonstrates diffuse cerebral edema.     O: Pulse 104   Temp 98.8  F (37.1  C)   Resp 10   Ht 1.676 m (5' 6\")   Wt 96.2 kg (212 lb 1.3 oz)   SpO2 100%   BMI 34.23 kg/m     General: Mechanically ventilated, sedated on ECMO   HEENT: Orotracheally intubated. Tongue is edematous and protruding slightly from the mouth. Stable significant ecchymosis along right lateral oral tongue and to a lesser degree on the left lateral oral tongue. Tongue is soft to palpation throughout. Large bite block in place between right molars with suture along right cheek.   Pulmonary: Mechanically ventilated.    A/P: Kathi Rehman is a 45 year old female with a past medical history of cardiac arrest 10/2 s/p multiple shocks and subsequent PCI with THELMA for LAD occlusion on VA ECMO (cannulated & intubated 10/2). The oral tongue is edematous and ecchymotic, likely from endotracheal tube compression in the setting of anticoagulation for ECMO, and possibly some trauma from dentition, though there are no puncture/bite marks. No evidence of hematoma or necrosis. Bite block in place.     - Alternate bite block between right and left side every 4 hours, ensure suture tied around the bite block is secured to the cheek with tegaderm to avoid posterior migration of the bite block into the airway, return block to the OR when no longer needed  - Keep oral tongue moist with good oral cares, saline rinses and apply water based lubricant to protruding portion of the tongue every 4 hours and PRN for dryness. If tongue is drying out despite these measures consider wrapping the protruding tongue in vaseline gauze.  - Remainder of cares per primary team  - ENT team to sign off at this time. Please contact with any further questions      -- Patient and above plan discussed with Dr. Tan.    Tuan Perez, " MD  Otolaryngology-Head & Neck Surgery, PGY-1  Please contact ENT with questions by dialing [phone] and entering job code [phone] when prompted.    Addended by Anthony Mcclain MD

## 2020-10-06 NOTE — PROGRESS NOTES
Pt remains on VA ECMO and IABP support. Cardene infusing to maintain MAP <85. 3% NS stopped for Na 158. Repeat head CT done today, findings discussed with Eben (spouse) by CSI. Plan is for ECMO decannulation tomorrow, and possibly MRI of head in the next couple days. Will monitor.

## 2020-10-06 NOTE — CONSULTS
[facility]  Palliative Care Consultation Note    Patient: Kathi Rehman  Date of Admission:  10/2/2020    Requesting Clinician / Team: CSI Team  Reason for consult:   Goals of care  Decisional support  Patient and family support    Recommendations:    No active symptom management issues for us to address at this time.    We will continue to communicate with , Eben, to review understanding of patient's condition and prognosis.  He appears to have a realistic perspective of the gravity of his wife's condition and the very low likelihood of meaningful recovery.  He knows serial imaging will help determine whether there is any likelihood of same.    While  identifies their star as central to meaning and support in their lives, he declines offer of chaplaincy at this time - he is very connected with his [employer] community and is already receiving practical/emotional/spiritual support from them.    These recommendations have been discussed with the CSI team .      Thank you for the opportunity to participate in the care of this patient and family. Our team: will continue to follow.     During regular M-F work hours -- if you are not sure who specifically to contact -- please contact us by sending a text page to our team consult pager at [phone].    After regular work hours and on weekends/holidays, you can call our answering service at [phone]. Also, who's on call for us is available in [facility] Smart Web.     Geri Duomnt MD  Palliative Medicine Fellow, *1912  Patient discussed with Dr. Tacho Urbina, staff, Palliative Medicine.    Pt seen, interviewed and examined by me with Pall Care fellow. A/P outlined in this note reflect my findings and recommendations.  Tacho Urbina MD  Palliative Medicine Consult Team  TT: 72 minutes, with > 50% spent in C/C/E patient/family/care teams re: GOC, POC, Sx management.     Assessments:  Kathi Rehman is a 45 year old  "female with no substantial past medical history (PCOS) who had an out of hospital cardiac arrest 10/2 with refractory ventricular fibrillation.  Underwent coronary angiogram on admission and was found to have a thrombotic occlusion of the proximal LAD.  Since admission, she has remained on ECMO, mechanical ventilation.  Neuroimaging (diffuse edema, cistern effacement) and serial examinations (fixed pupils, unresponsive without cough or gag relfex) demonstrating anoxic brain injury with poor prognosis.    Today, the patient was seen to assist family (, Eben) in goals of care considerations.    Prognosis, Goals, & Planning:      Functional Status just prior to hospitalization: 0 (Fully active, able to carry on all activities without restriction)      Prognosis, Goals, and/or Advance Care Planning were addressed today: Yes        Summary/Comments:   Spoke at length over telephone with Eben.  He understands poor neurologic prognosis with serial CT examinations.  He and his wife are [employer], and he believes that everything that has happened is within God's plan.  He understands that pending serial CTs, there may be evidence that Kathi (\"Chary\") will not be able to make any meaningful recovery.  If this is the case, he knows he will need to make difficult decisions about what she would want with respect to withdrawal of care.      Patient's decision making preferences: unable to assess          Patient has decision-making capacity today for complex decisions: No            I have concerns about the patient/family's health literacy today: No           Patient has a completed Health Care Directive: No.       Code status: Full Code    Coping, Meaning, & Spirituality:   Mood, coping, and/or meaning in the context of serious illness were addressed today: No  Summary/Comments:     Social:     Living situation: Lives with Eben.  No children 2/2 difficulty with conception (PCOS).    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  Went to urgent care to be evaluated and arrested as she was getting out of the car.  Bystander CPR, however refractory vfib arrest with multiple shocks (7), epinephrine and amiodarone.  On admission found to have proximal LAD occlusion, now on ECMO and mechanical ventilation with signs and symptoms concerning for dense anoxic brain injury.    Key Palliative Symptom Data:  We are not helping to manage these symptoms currently in this patient.    Patient currently on fentanyl on mechanical ventilation (100 mcg/hr, prn 50mcg bolus, no prn doses since yesterday AM)    ROS:  Unable to assess, patient not responsive     Past Medical History:  Polycystic ovarian syndrome     Past Surgical History:  None      Family History:  Unable to assess     Allergies:  No Known Allergies     Medications:  I have reviewed this patient's medication profile and medications from this hospitalization.   Noted:  Fentanyl/midazolam gtts    Physical Exam:  Vital Signs: Temp: 98.6  F (37  C) Temp src: Esophageal   Pulse: 112   Resp: 20 SpO2: 97 % O2 Device: Mechanical Ventilator    Weight: 212 lbs 1.32 oz   patient is sedated, intubated  Appears comfortable    Data reviewed:  Recent imaging reviewed, my comments on pertinents:   CT head from today:  Sequela of anoxic event seen as bilateral deep gray nuclei ischemic  changes and also ischemic changes involving some of the adjacent white  matter and parietal cortices as described above.     Diffuse intracranial edema with  resultant effacement of the sulci,  ventricular system, basal cisterns and downward tonsillar herniation.  When compared with 10/5/2020 dated study, the sulcal effacement, basal  cisternal effacement and ventricular effacement have slightly  decreased.    Recent lab data reviewed, my comments on pertinents:   Therapeutic hypernatremia/hyperglycemia

## 2020-10-06 NOTE — PROGRESS NOTES
Cardiology Critical Care Note - Cardiology  Joss Pfeiffer M.D.  Critical Care Diagnoses:  Acute hypoxic respiratory failure  Pneumonia/ Septic shock  Acute renal failure  Anoxic brain injury      Critical Care management intervention:  Continue nicardipine gtrt   and heparin gtt  Stop mannitol  Stop hypertonic saline      Spent40 minutes on critical care management on this patient not including ECMO management.      Professor Kentrell ROQUE  Interventional Cariology and Cardiac Critical Care      .  ECMO Attending Progress Note  10/6/2020    Kathi Rehman is a 45 year old female who was cannulated for ECMO  due to cardiac qarrest    Cannulation Site:  15 Fr in the R femoral artery  25 Fr in the L femoral vein    Interval events: brain edema and herniation with complete effacement and multiple infarcts.    Physical Exam:  Temp:  [98.2  F (36.8  C)-98.8  F (37.1  C)] 98.6  F (37  C)  Pulse:  [] 106  Resp:  [10-22] 22  MAP:  [72 mmHg-133 mmHg] 77 mmHg  Arterial Line BP: ()/() 96/52  FiO2 (%):  [40 %-70 %] 60 %  SpO2:  [92 %-100 %] 96 %    Intake/Output Summary (Last 24 hours) at 10/6/2020 1047  Last data filed at 10/6/2020 1010  Gross per 24 hour   Intake 5036.89 ml   Output 5660 ml   Net -623.11 ml    Ventilation Mode: CMV/AC  (Continuous Mandatory Ventilation/ Assist Control)  FiO2 (%): 60 %  Rate Set (breaths/minute): 10 breaths/min  Tidal Volume Set (mL): 400 mL  PEEP (cm H2O): 7 cmH2O  Oxygen Concentration (%): 70 %  Resp: 22       Labs:  Recent Labs   Lab 10/06/20  1001 10/06/20  0810 10/06/20  0601 10/06/20  0346 10/06/20  0207 10/05/20  2349   PH 7.45 7.44 7.47* 7.46* 7.45 7.43   PCO2 30* 31* 32* 33* 35 36   PO2 114* 88 65* 89 83 59*   HCO3 21 21 23 24 24 24   O2PER  --   --  70.0 70.0 70.0 70.0      Recent Labs   Lab 10/06/20  1001 10/06/20  0810 10/06/20  0346 10/05/20  2204 10/05/20  1612 10/05/20  1004   WBC  --   --  19.6* 19.2* 16.6* 21.4*   HGB 6.8* 8.2* 7.9* 8.0* 6.5* 7.8*      Creatinine   Date Value Ref Range Status   10/06/2020 2.33 (H) 0.52 - 1.04 mg/dL Final   10/05/2020 2.33 (H) 0.52 - 1.04 mg/dL Final   10/05/2020 2.24 (H) 0.52 - 1.04 mg/dL Final   10/05/2020 2.12 (H) 0.52 - 1.04 mg/dL Final       Blood Flow (Circuit) LPM: 3.59 LPM  Gas Flow  LPM: 0.5 LPM  Gas FiO2   %: 100 %  ACT  (seconds): 171 seconds  Blood Temp  (degrees C): 37 C  Pulse Oximetry  (SpO2%): 95 %  Arterial Pressure  mmH mmHg      ECMO Issues including assessments and plan on DOS 10/6/2020:  Neuro: Sedated for mechanical ventilation and ECMO.  No acute distress.  CV: Cardiogenic shock.  Hemodynamically stable on no pressors  Pulm: Keep vent settings at rest settings as above.  FEN/Renal: Electrolytes stable w/ replacement protocols in place, 2.33 Cr stable, UOP stable  Heme: ACT goal: 180-200, Hemoglobin 7.9 .  Minimal oozing around the ECMO cannulas.  ID: Receiving empiric antibiotics  Cannulae: Position is acceptable on exam and the available imaging.  Distal perfusion cannula is in place and patent.  Extremities are well-perfused.     I have personally reviewed the ECMO flows, oxygenation and CO2 clearance, anticoagulation, and cannula position.  I have also personally assessed the patient's systemic response with hemodynamics, oxygenation, ventilation, and bleeding.       The patient requires continued ECMO support and management in the ICU.  I have discussed patient care and treatment plan with the CSI team.      Joss Pfeiffer MD    2020

## 2020-10-06 NOTE — PROGRESS NOTES
ECMO Shift Summary:      ECMO Equipment:  Console serial number: [id number]  Circuit Lot number: [id number]  Oxygenator Lot number: [id number]      Patient remains on VA ECMO, all equipment is functioning and alarms are appropriately set. RPM's 3400 with flow range 3.44-3.61 L/min. Sweep gas is at 0.5 LPM and FiO2 100%. Circuit remains free of air, clot. Fibrin noted in cannula connectors. Cannulas are secure with large bleeding from left femoral cannula site and moderate bleeding from right femoral cannula site. Extremities are warm.     Significant Shift Events: None    Vent settings:  Ventilation Mode: CMV/AC  (Continuous Mandatory Ventilation/ Assist Control)  FiO2 (%): 70 %  Rate Set (breaths/minute): 10 breaths/min  Tidal Volume Set (mL): 400 mL  PEEP (cm H2O): 7 cmH2O  Oxygen Concentration (%): 70 %  Resp: 10  .    Heparin is running at 1150 u/hr, ACT range 150-171.    Urine output is >80 ml/hr. Blood loss was moderate to large from femoral cannula sites. No product given.      Intake/Output Summary (Last 24 hours) at 10/6/2020 0425  Last data filed at 10/6/2020 0400  Gross per 24 hour   Intake 4813.82 ml   Output 5360 ml   Net -546.18 ml       ECHO:  No results found for this or any previous visit.No results found for this or any previous visit.    CXR:  Recent Results (from the past 24 hour(s))   CT Head w/o Contrast   Result Value    Radiologist flags Diffuse cerebral edema with multifocal infarcts (AA)    Narrative    CT HEAD W/O CONTRAST 10/5/2020 2:36 PM    Provided History: s/p Cardiac arrest, on ECMO, pupils fixed/dilated    Comparison: CT head 10/2/2020.    Technique: Using multidetector thin collimation helical acquisition  technique, axial, coronal and sagittal CT images from the skull base  to the vertex were obtained without intravenous contrast.     Findings:    There is diffuse loss of the gray-white differentiation. Significant  hypodensities are present in the bilateral basal ganglia,  most  pronounced involving the bilateral corpus striatum. There is  additional involvement of the bilateral thalami, the bilateral  paramedian posterior parietal lobes and the posteroinferior temporal  lobes.    There is no hydrocephalus. Moderate effacement of the fourth  ventricle. Basal cisterns are effaced.    There is no depressed calvarial fracture. Air-fluid levels noted in  paranasal sinuses and mastoid air cells.       Impression    Impression:   Findings of diffuse cerebral edema with sulcal and basal cistern  effacement. Infarcts involving the bilateral corpus stratum and to  lesser degree thalami. Additional infarcts of the bilateral paramedian  right greater than left parietal lobes.    [Critical Result: Diffuse cerebral edema with multifocal infarcts]    Finding was identified on 10/5/2020 2:45 PM.     Dr. Conway was contacted by Dr. Vasquez at 10/5/2020 2:55 PM and  verbalized understanding of the critical finding.     I have personally reviewed the examination and initial interpretation  and I agree with the findings.    DIMITRI MARCUS MD       Labs:  Recent Labs   Lab 10/06/20  0346 10/06/20  0207 10/05/20  2349 10/05/20  2204   PH 7.46* 7.45 7.43 7.42   PCO2 33* 35 36 37   PO2 89 83 59* 72*   HCO3 24 24 24 24   O2PER 70.0 70.0 70.0 70       Lab Results   Component Value Date    HGB 7.9 (L) 10/06/2020    PHGB <30 10/05/2020    PLT 82 (L) 10/06/2020    FIBR 592 (H) 10/06/2020    INR 1.36 (H) 10/06/2020    PTT 66 (H) 10/06/2020    DD 3.9 (H) 10/05/2020    AXA 0.39 10/06/2020    ANTCH 50 (L) 10/05/2020         Plan is repeat head CT and possible decannulation today.      Va Holt RN  ECMO Specialist  10/6/2020 4:25 AM

## 2020-10-07 NOTE — PROGRESS NOTES
D;Pt remains in V-A ECMO RPM 3400/3.45/s 0.5/100%.IABP 1;1 Vent setting unchanged.Given 1u PRC for 7.1  To 8.0.UO 70-120cc/hr.Na down to 161.Nicardipine gtt to 8.5mg/hr to keep MAP<85.Pupls size varies and unequal all nite.Notified H.O.R>L and equal sometime and Dilated to 5 few sec then  Down to size 3.ETT secretions thick bloody secretions.Afebrile.  I;A;Irregular size of pupils possible  indicating  signs of herniating?Requiring more nicardipine.ECMO cannula sites w/minimum bleeding.  P;Continuue to assess neuro status.Manage the BP within prameters.May repeat CT of head to make further decisions.

## 2020-10-07 NOTE — PROGRESS NOTES
ECMO Shift Summary:12D      ECMO Equipment:  Console serial number: [id number]  Circuit Lot number: [id number]  Oxygenator Lot number: [id number]    Patient remains on VA ECMO, all equipment is functioning and alarms are appropriately set. RPM's 3400 with flow range 3.5 L/min. Sweep gas is at 0.5 LPM and FiO2 100%. Circuit remains free of air, clot and fibrin. Cannulas are secure with no bleeding from site. Extremities are warm.    Significant Shift Events: none    Vent settings:  Ventilation Mode: CMV/AC  (Continuous Mandatory Ventilation/ Assist Control)  FiO2 (%): 60 %  Rate Set (breaths/minute): 10 breaths/min  Tidal Volume Set (mL): 400 mL  PEEP (cm H2O): 7 cmH2O  Oxygen Concentration (%): 60 %  Resp: 21  .    Heparin is running at 1350 u/hr, ACT range 171-183.    Urine output is per RN charting, blood loss was minimal. No blood product given.      Intake/Output Summary (Last 24 hours) at 10/7/2020 1755  Last data filed at 10/7/2020 1700  Gross per 24 hour   Intake 3575.29 ml   Output 2215 ml   Net 1360.29 ml       ECHO:  No results found for this or any previous visit.No results found for this or any previous visit.    CXR:  Recent Results (from the past 24 hour(s))   XR Chest Port 1 View    Narrative    EXAM: XR CHEST PORT 1 VW  10/7/2020 1:54 AM     HISTORY:  Check endotracheal tube placement and ECLS cannula  placement.       COMPARISON:  Chest x-ray 10/6/2020.    FINDINGS: AP radiograph of the chest. Endotracheal tube projecting  over the midthoracic trachea. Inferior approach ECMO cannula with the  tip overlying the mid SVC. Intra-aortic balloon pump marker projecting  near the thu. Esophageal temperature probe overlying the cervical  esophagus. Partial visualization of enteric tube with the tip  overlying the stomach directed towards the fundus and the side port  outside of the field of view.    Stable cardiomediastinal silhouette. No pleural effusion or  pneumothorax. Unchanged to slightly improved  diffuse hazy opacities  throughout the lungs. The visualized upper abdomen is unremarkable.  The bones are stable.      Impression    IMPRESSION:  1. Unchanged to slightly decreased mixed interstitial and airspace  hazy opacities throughout the lungs.  2. Stable support devices.    I have personally reviewed the examination and initial interpretation  and I agree with the findings.    JEZ ENG MD       Labs:  Recent Labs   Lab 10/07/20  1607 10/07/20  1416 10/07/20  1207 10/07/20  0958 10/07/20  0344 10/07/20  0344 10/06/20  1820 10/06/20  1820   PH 7.46* 7.50* 7.50* 7.46*   < > 7.47*   < > 7.48*   PCO2 30* 27* 26* 30*   < > 30*   < > 31*   PO2 104 102 117* 116*   < > 84   < > 85   HCO3 21 21 20* 21   < > 22   < > 23   O2PER 60  100  60  --   --  60  --  60  --  60    < > = values in this interval not displayed.       Lab Results   Component Value Date    HGB 7.8 (L) 10/07/2020    PHGB 30 (H) 10/07/2020    PLT 76 (L) 10/07/2020    FIBR 540 (H) 10/07/2020    INR 1.43 (H) 10/07/2020    PTT 57 (H) 10/07/2020    DD 18.4 (H) 10/07/2020    AXA 0.57 10/07/2020    ANTCH 69 (L) 10/07/2020         Plan is to remain stable on VA ECMO.      Brock Dobbins, RT  ECMO Specialist  10/7/2020 5:55 PM

## 2020-10-07 NOTE — PROGRESS NOTES
CVTS:     Received request for ECMO decannulation.   Due to special circumstances of questionable Neuro status, requesting   CSI staff talk to Dr Sexton to discuss the Palliative nature of this decannulation.     Judson Lewis PA-C  Cardiothoracic Surgery  Pager [phone]    8:33 AM   October 7, 2020

## 2020-10-07 NOTE — PROGRESS NOTES
ECMO Shift Summary:      ECMO Equipment:  Console serial number: [id number]  Circuit Lot number: [id number]  Oxygenator Lot number: [id number]      Patient remains on VA ECMO, all equipment is functioning and alarms are appropriately set. RPM's 3400 with flow range 3.39-3.54 L/min. Sweep gas is at 0.5 LPM and FiO2 100%. Circuit remains free of air, clot. Fibrin noted in cannula connectors. Cannulas are secure with small amount of bleeding from site. Extremities are warm.     Significant Shift Events: None    Vent settings:  Ventilation Mode: CMV/AC  (Continuous Mandatory Ventilation/ Assist Control)  FiO2 (%): 60 %  Rate Set (breaths/minute): 10 breaths/min  Tidal Volume Set (mL): 400 mL  PEEP (cm H2O): 7 cmH2O  Oxygen Concentration (%): 60 %  Resp: 21  .    Heparin is running at 1350 u/hr, ACT range 145-175.    Urine output is >80 ml/hr, blood loss was minimal. Product given included 1 unit of PRBC.      Intake/Output Summary (Last 24 hours) at 10/7/2020 0438  Last data filed at 10/7/2020 0400  Gross per 24 hour   Intake 3459.79 ml   Output 2370 ml   Net 1089.79 ml       ECHO:  No results found for this or any previous visit.No results found for this or any previous visit.    CXR:  Recent Results (from the past 24 hour(s))   CT Head w/o Contrast    Narrative    CT HEAD W/O CONTRAST 10/6/2020 2:13 PM    History: s/p cardiac arrest   ICD-10: Cardiac arrest    Comparison: CT of the head dated 10/5/2020    Technique: Using multidetector thin collimation helical acquisition  technique, axial, coronal and sagittal CT images from the skull base  to the vertex were obtained without intravenous contrast.    Findings:     There is diffuse effacement of the cerebral sulci, effacement of the  basal cisterns, partial effacement of the lateral ventricles  ventricular system due to increased intracranial pressure. There are  are areas of hyperdensity along the left frontoparietal sulci (series  3, image 21) which is either due to  pseudo subarachnoid hemorrhage due  to a diffuse cerebral edema or real minimal real blood products in the  subarachnoid spaces. Persistent hypoattenuation involving bilateral  basal ganglia, bilateral posterior thalami and right lateral thalami  and areas of hypoattenuation in bilateral parietal parasagittal more  extensive on the right, right temporal periventricular and left  temporal periventricular regions. Lateral and third ventricular  effacement has decreased. Thyroid cancer herniation is again noted.  There is also hypoattenuation in bilateral cerebellar hemispheres. The  remainder of the brain parenchyma there is loss of gray-white  differentiation. Basal cisternal effacement is also slightly  decreased. There is downward midline herniation particularly of the  third ventricle.      Impression    Impression:     Sequela of anoxic event seen as bilateral deep gray nuclei ischemic  changes and also ischemic changes involving some of the adjacent white  matter and parietal cortices as described above.    Diffuse intracranial edema with resultant effacement of the sulci,  ventricular system, basal cisterns and downward tonsillar herniation.  When compared with 10/5/2020 dated study, the sulcal effacement, basal  cisternal effacement and ventricular effacement have slightly  decreased.    I have personally reviewed the examination and initial interpretation  and I agree with the findings.    TY ROMAN MD       Labs:  Recent Labs   Lab 10/07/20  0344 10/07/20  0205 10/07/20  0005 10/06/20  2200 10/06/20  1820 10/06/20  1820 10/06/20  1605 10/06/20  1605 10/06/20  1411   PH 7.47* 7.46* 7.46* 7.46*   < > 7.48*   < > 7.47* 7.49*   PCO2 30* 29* 30* 30*   < > 31*   < > 32* 30*   PO2 84 81 83 81   < > 85   < > 77* 83   HCO3 22 21 22 21   < > 23   < > 23 23   O2PER 60  --   --   --   --  60  --  60 60.0    < > = values in this interval not displayed.       Lab Results   Component Value Date    HGB 8.0 (L) 10/07/2020     PHGB <30 10/06/2020    PLT 80 (L) 10/07/2020    FIBR 565 (H) 10/07/2020    INR 1.43 (H) 10/07/2020    PTT 46 (H) 10/07/2020    DD 17.1 (H) 10/07/2020    AXA 0.35 10/07/2020    ANTCH 62 (L) 10/06/2020         Plan is possible decannulation today.      Va Holt RN  ECMO Specialist  10/7/2020 4:38 AM

## 2020-10-07 NOTE — PROGRESS NOTES
"[facility]  Palliative Care Daily Progress Note       Recommendations & Counseling       We will continue to reach out to Eben, patient spouse and primary decision maker, for support and to address any questions/concerns    Palliative care  contacted patient's spouse - he is well connected in his [employer] community and did not ask for additional support    Suggested to Eben that he could include his  or another [employer] leader in any care conference conversations    We would like to participate in any care conference as needed       Geri Dumont MD, Palliative Care Fellow, *1230   Staffed with Dr. Tacho Urbina, Palliative Care Faculty  Pt seen, interviewed and examined by me and by Pall Care Fellow. A/P outlined in this note reflect my findings and recommendations.  Tacho Urbina MD  Palliative Medicine Consult Team  Pager: [phone]  NB   Assessments          46 y/o woman with OOH cardiac arrest was found to have a proximal LAD occlusion, required ECMO support.  Now concern for severe anoxic brain injury without improvement.    Today, the patient was seen for ongoing support for family, spouse Eben.      Prognosis, Goals, or Advance Care Planning was addressed today with: Yes.  Mood, coping, and/or meaning in the context of serious illness were addressed today: Yes.  Summary/Comments: Eben continues to articulate the central position of star for both him and Kathi \"Chary.\"  He understands the poor neurologic prognosis and is awaiting to hear results/recommendations after next imaging in the next day or two.  He sees all of these events within God's plan.            Interval History:     Chart review/discussion with unit or clinical team members:   Patient continues with signs of severe anoxic brain injury.  Cardiac function improved and likely no longer needs ECMO support, however plan for decannulation is pending neurologic assessment/plan of care in " the next day or two.    Per patient or family/caregivers today:  Patient unable to participate.   No update from spouse, Eben.    [facility] Palliative Symptoms:  We are not helping to manage these symptoms currently in this patient.  Patient is not on opioids, no sedation.           Review of Systems:     Unable to perform as patient is unresponsive.          Medications:     I have reviewed this patient's medication profile and medications during this hospitalization.    Noted meds:  Notable for no sedation.           Physical Exam:   Vitals were reviewed  Temp: 98.8  F (37.1  C) Temp src: Esophageal   Pulse: 106   Resp: 18 SpO2: 100 % O2 Device: Mechanical Ventilator    Patient ventilated, lying still, no agitation              Data Reviewed:     Reviewed recent pertinent imaging, comments:   No new CNS imaging since 10/6.    Reviewed recent labs, comments:   Na 164

## 2020-10-07 NOTE — PROGRESS NOTES
SPIRITUAL HEALTH SERVICES  SPIRITUAL ASSESSMENT Progress Note (Palliative Focus)  [facility] ([place] 4E    REFERRAL SOURCE: Palliative care initial spiritual assessment.    Per patient Kathi Rehman's  Eben, their Pentec[employer]al star is a central source of meaning; they believe everything in their lives in encompassed by God's plan for them. They decline  support at this time, having excellent support from their own [employer] community and .     Plan: I am available for support as needed while Palliative Care is consulted, with no plans to follow up unless requested.    Sneha Lowe  Palliative   Pager [phone]  [facility] Inpatient Team Consult pager [phone] (M-F 8-4:30)  After-hours Answering Service [phone]

## 2020-10-07 NOTE — PROGRESS NOTES
Na 165, CSI notified. Free water @ 50 ml/hr started. Also discussed switching from Cardene gtt (mixed with NS) to a different gtt, pt currently getting ~50 ml/hr (CSI will discuss with pharmacy).

## 2020-10-07 NOTE — PROGRESS NOTES
Brief Neurocritical Care Note:    CT imaging shows multifocal infarcts superimposed on diffuse cerebral edema.  There is some reduction in the edema with the measures that were taken however further raising of the sodium will likely be harmful to the kidneys without providing additional benefit and would not give mannitol due to kidney function.  EEG is profoundly suppressed and has gotten worse despite sedating medications being weaned off.  Overall feel that this portends a poor prognosis for neurologic recovery.  Discussed with primary team.  We will follow peripherally but will be available for a family meeting if needed.  Please call [phone] with questions.    Caryl Jameson MD

## 2020-10-07 NOTE — PROGRESS NOTES
Pt remains on VA ECMO and IABP support. IABP changed to 1:3 and 70% for hypertension, remains on Cardene gtt (max concentrated). Most recent Na 166, free water increased to 100 ml/hr. No fluid or blood product given. Pt's  at bedside and updated by myself and CSI. Report given to oncoming RNDoretha.

## 2020-10-07 NOTE — PROGRESS NOTES
Critical Care Cardiology   Progress Note  Kathi Rehman MRN: [id number]  Age: 45 year old, : 1975  Date: 10/6/2020      History of Present Illness   Kathi Rehman is a 45 year old female with PMHx of PCOS presented to [facility] on 10/2/2020 following out of hospital refractory VF cardiac arrest. Patient was reportedly going to urgent care when she collapsed getting out of the car. Clinic staff performed immediate bystander CPR. Initial rhythm was VF. Brief ROSC for 1 minute but converted to VF again. Patient was shocked a total of 7 times, received 1 mg epi, and 300 mg amiodarone. She was transferred to [facility] CCL approximately 1 hour after arrest on CARMINA and in asystole. Initial labs notable for PCO2 61, PO2 69, andlactate of 19. Patient was placed on VA ECMO and underwent coronary angiogram which revealed thrombotic occlusion of the proximal LAD with minimal CAD otherwise. Pt underwent PCI w/ THELMA of pLAD. Cooling catheter was placed for TTM and pt was transferred to CVICU for post-arrest care.      Subjective/Interval Events   Repeat CT head yesterday showed tonsillar herniation. Brainstem reflexes intact. Pupils now ~4 mm and reactive.  Spoke with  yesterday re: CT results. Plan as of that discussion is to allow a few more days for recovery with the understanding that the CT head showed significant injury which indicates low likelihood of meaningful recovery.     Assessment and Plan   Today's plan:  -decannulation today or tomorrow   -add FWF to keep Na 155-160  -concentrate nicardipine to limit additional Na   ------------------  Neurology:   Anoxic brain injury w/ diffuse cerebral edema and sulcal/basal cistern effacement   Scattered infarcts  Intubated. Cooled to initially to 34 degrees; normothermic since 10/4 at 0900. Pupils ~4 mm, reactive. Pt is unresponsive although does have cough and gag reflexes.   Imaging/procedures:   CTH 10/6: Sequela of anoxic event seen as bilateral deep gray nuclei  ischemic changes and also ischemic changes involving some of the adjacent white matter and parietal cortices as described above. Diffuse intracranial edema with resultant effacement of the sulci, ventricular system, basal cisterns and downward tonsillar herniation. When compared with 10/5/2020 dated study, the sulcal effacement, basal cisternal effacement and ventricular effacement have slightly decreased.  EEG 10/6: worsening encephalopathy, no seizures   Sedation/paralytics:   None  Plan:   -per discussion w/ pt's , will allow a few more days for recovery   -Neurocritical Care following      Cardiovascular:   [facility]CA   Thrombotic occlusion of pLAD s/p PCI w/ THELMA   Ischemic cardiomyopathy (EF 45-50%)  Refractory VF arrest s/p peripheral VA ECMO insertion w/ distal reperfusion catheter. Thrombotic occlusion of pLAD w/ minimal CAD otherwise; s/p PCI w/ THELMA of pLAD.  MCS:   VA ECMO 3.42/3400/0.5/100% - ACT goal: 160-180  IABP 1:1 - pulsatility when paused   Imaging/procedures:   Coronary angiogram 10/2: thrombotic occlusion of pLAD w/ minimal CAD otherwise; s/p PCI w/ THELMA of pLAD  TTE 10/4: VA ECMO turndown from 3.4 to 1.5L with the IABP off. Baseline: Mildly (EF 40-45%) reduced left ventricular function is present. Apical wall akinesis is present. With turndown, LV fxn improves to 45-50%. Right ventricular function, chamber size, wall motion, and thickness are normal. No pericardial effusion is present. Compared to prior, LV fxn is improved.  EKG 10/7: ST 100s, anterolateral Q waves w/ persistent Lottie, inferior ST depression, QTc 500   Vasoactive/antiarrhythmic infusions:   Nicardipine    Plan:   -drawing ABGs from RRA  -wean nicardipine as able  -ACT goal 160-180  -continue ASA and ticagrelor  -hold ACE/ARB given ROGERS  -hold beta blocker given shock   -hold statin given hepatic injury after arrest    Pulmonary:  Acute hypoxic respiratory failure   Aspiration pneumonia   Vent settings:   CMV 10/400/7/60% - ABG:  7.47/29/84/21  Imaging/procedures:   CXR 10/7: Diffuse opacities. Lines in stable position.   CT chest 10/2: Small bilateral pleural effusions with consolidative opacities in the dependent lung fields with diffuse scattered nodular ground glass opacities and smooth interlobular septal thickening. Overall findings suggestive of pulmonary edema underlying infection is not entirely  excluded.  Plan:   -overbreathing vent   -wean vent as able  -daily CXR  -Q2h ABGs for now  -consider scheduled duonebs if signs of lung dz, currently PRN     GI and Nutrition:   Acute liver injury, improving   Imaging/procedures:   CT abd/pelvis 10/2: Relatively featureless-appearing bowel which is a nonspecific finding but can be seen in patient's with shock bowel. No pneumatosis, portal venous gas, or pneumoperitoneum.   Plan:   -start TF via OG   -monitor BID LFTs  -continue bowel regimen   -GI prophylaxis: protonix     Renal, Fluid, and Electrolytes:   Acute kidney injury  Permissive hypernatremia   Benign right adrenal adenoma on CT   Received mannitol 10/5 after CTH and was started on 3% Na infusion to keep Na 155-160. Infusion off since 10/6 d/t Na >160. Creatinine 2.16, BUN 52. 2.6L UOP yesterday and net positive 2L.   Plan:   -Na 165 this AM; add FWF to keep Na 155-160  -3% Na infusion to keep Na 155-160  -avoid hypotonic solutions  -monitor urine output  -maintain K>3.8 and Mg>2    Infectious Disease:   Aspiration pneumonia   Leukocytosis   WBC 20.5; afebrile. No signs of infection. Leukocytosis c/w arrest. Blood and sputum cultures collected - NGTD.   Plan:   -vancomycin/zosyn x5 days for aspiration pneumonia   -daily blood cultures while on ECMO   -monitor for signs of infection given cooling, lines, and leukocytosis    Hematology and Oncology:   Acute blood loss anemia   Thrombocytopenia   Right RP hematoma   Right inguinal subcutaneous hematoma   Hgb 7.5, plt 80. INR 1.43. Receiving heparin for ECMO and ASA/ticagrelor for  "THELMA. Requiring intermittent PRBC transfusions.   Plan:   -heparin gtt for ECMO with ACT goal 160-180  -cryo PRN for fibrinogen < 200; FFP for INR >2  -transfuse for hgb <8  -US LE w/ arterial duplex per ECMO protocol   -DVT prophylaxis: heparin gtt    Endocrine:   Hyperglycemia   No known medical history. BG elevated in setting of critical illness.  Plan:   -insulin gtt  -hgb A1c: 5.3%    Integumentary   Tongue abrasion and swelling, likely secondary to biting tongue with arrest and intubation.   -ENT consulted; orders for bite block and keep oral tongue moistened   -C nurse following    Lines:   R femoral arterial ECMO cannula October 2, 2020  L femoral venous ECMO cannula October 2, 2020   L femoral arterial line (IABP) October 2, 2020  R femoral venous line (Thermogard) October 2, 2020  R radial arterial line October 2, 2020  ETT October 2, 2020  Babcock catheter October 2, 2020  OG tube October 2, 2020  Restraint: not needed    Current lines are required for patient management      Family update by me today: Yes     Code Status: Full code    The pt was discussed and evaluated with Dr. Pfeiffer, attending physician, who agrees with the assessment and plan above.     Rosa Clay, SAMAN APRN CNP   Critical Care Cardiology   [phone]     Objective     Pulse 108   Temp 98.4  F (36.9  C)   Resp 20   Ht 1.676 m (5' 6\")   Wt 95.4 kg (210 lb 5.1 oz)   SpO2 99%   BMI 33.95 kg/m    Temp:  [98.2  F (36.8  C)-98.8  F (37.1  C)] 98.4  F (36.9  C)  Pulse:  [104-113] 108  Resp:  [16-22] 20  MAP:  [73 mmHg-179 mmHg] 85 mmHg  Arterial Line BP: ()/() 109/53  FiO2 (%):  [60 %-70 %] 60 %  SpO2:  [96 %-100 %] 99 %  Wt Readings from Last 2 Encounters:   10/07/20 95.4 kg (210 lb 5.1 oz)     I/O last 3 completed shifts:  In: 4611.29 [I.V.:4461.29; NG/GT:150]  Out: 2870 [Urine:2870]    GENERAL APPEARANCE: Intubated. Unresponsive.   HEENT: No icterus, pupils 4 mm, left pupil reactive, right pupil sluggish, ETT " in place, OG tube in place.  CARDIOVASCULAR: tachycardic, normal S1/S2, no S3/S4 and no murmur, click, or rub. Normal PMI. Dorsalis pedis pulses.  RESP: Coarse bilaterally. Mechanical ventilation.    GASTRO: Abdomen soft, bowel sounds hypoactive but present.  GENITOURINARY: Babcock in place.  EXTREMITIES: Cool, +1 edema, DP pulses dopplerable. VA ECMO cannulas in right and left groin, ThermoGard in right groin, and IABP in left groin.   NEURO: Intubated. Pupils 4 mm, left pupil reactive, right pupil sluggish. Unresponsive.   INTEGUMENTARY: No rashes. RFA cannula site oozing; otherwise line sites CDI.   LINES/TUBES/DRAINS: VA ECMO cannulas R and L groin, IABP L groin, and ThermoGard in R groin. R radial arterial line. ETT. OG. Babcock Catheter.     Data     Vent Settings:  Resp: 20 SpO2: 99 % O2 Device: Mechanical Ventilator      Arterial Blood Gas:   Recent Labs   Lab 10/07/20  0605 10/07/20  0344 10/07/20  0205 10/07/20  0005 10/06/20  1820 10/06/20  1820 10/06/20  1605 10/06/20  1605 10/06/20  1411   PH 7.47* 7.47* 7.46* 7.46*   < > 7.48*   < > 7.47* 7.49*   PCO2 29* 30* 29* 30*   < > 31*   < > 32* 30*   PO2 84 84 81 83   < > 85   < > 77* 83   HCO3 21 22 21 22   < > 23   < > 23 23   O2PER  --  60  --   --   --  60  --  60 60.0    < > = values in this interval not displayed.       Vitals:    10/05/20 0200 10/06/20 0545 10/07/20 0400   Weight: 95.8 kg (211 lb 3.2 oz) 96.2 kg (212 lb 1.3 oz) 95.4 kg (210 lb 5.1 oz)   I/O last 3 completed shifts:  In: 4611.29 [I.V.:4461.29; NG/GT:150]  Out: 2870 [Urine:2870]  Recent Labs   Lab 10/07/20  0605 10/07/20  0344 10/06/20  2218 10/06/20  2218   * 161*   < > 162*   POTASSIUM 4.0 3.6   < > 3.6   CHLORIDE  --  133*  --  133*   CO2  --  23  --  20   ANIONGAP  --  5  --  8   * 129*  137*   < > 146*   BUN  --  52*  --  51*   CR  --  2.16*  --  2.20*   CARMELO  --  8.0*  --  8.0*    < > = values in this interval not displayed.     No components found for: URINE   Recent Labs    Lab 10/07/20  0344 10/06/20  2218 10/06/20  1605   * 158* 161*   ALT 77* 76* 80*   BILITOTAL 1.3 1.2 1.1   ALBUMIN 2.5* 2.4* 2.4*   PROTTOTAL 5.6* 5.5* 5.5*   ALKPHOS 92 60 87     Temp: 98.4  F (36.9  C) Temp src: EsophagealTemp  Min: 98.2  F (36.8  C)  Max: 98.8  F (37.1  C)   Recent Labs   Lab 10/07/20  0605 10/07/20  0344 10/07/20  0205 10/07/20  0005 10/06/20  2218 10/06/20  1605 10/06/20  1605 10/06/20  0951 10/06/20  0951 10/06/20  0346 10/06/20  0346   WBC  --  20.5*  --   --  18.9*  --  18.3*  --  18.8*  --  19.6*   HGB 7.5* 8.0* 8.2* 7.1* 7.1*   < > 7.4*   < > 7.5*   < > 7.9*   HCT  --  24.5*  --   --  21.0*  --  22.2*  --  22.6*  --  24.2*   MCV  --  92  --   --  94  --  93  --  93  --  92   RDW  --  15.2*  --   --  15.2*  --  15.2*  --  14.9  --  14.7   PLT  --  80*  --   --  81*  --  83*  --  86*  --  82*    < > = values in this interval not displayed.     Recent Labs   Lab 10/07/20  0344 10/06/20  2218 10/06/20  1605 10/06/20  0951 10/06/20  0346   INR 1.43* 1.43* 1.32* 1.40* 1.36*   PTT 46* 44* 25 57* 66*     Recent Labs   Lab 10/07/20  0605 10/07/20  0344 10/07/20  0205 10/07/20  0005 10/06/20  2218   * 129*  137* 136* 125* 146*       All imaging personally reviewed:  Recent Results (from the past 24 hour(s))   CT Head w/o Contrast   Result Value    Radiologist flags Diffuse cerebral edema with multifocal infarcts (AA)    Narrative    CT HEAD W/O CONTRAST 10/5/2020 2:36 PM    Provided History: s/p Cardiac arrest, on ECMO, pupils fixed/dilated    Comparison: CT head 10/2/2020.    Technique: Using multidetector thin collimation helical acquisition  technique, axial, coronal and sagittal CT images from the skull base  to the vertex were obtained without intravenous contrast.     Findings:    There is diffuse loss of the gray-white differentiation. Significant  hypodensities are present in the bilateral basal ganglia, most  pronounced involving the bilateral corpus striatum. There  is  additional involvement of the bilateral thalami, the bilateral  paramedian posterior parietal lobes and the posteroinferior temporal  lobes.    There is no hydrocephalus. Moderate effacement of the fourth  ventricle. Basal cisterns are effaced.    There is no depressed calvarial fracture. Air-fluid levels noted in  paranasal sinuses and mastoid air cells.       Impression    Impression:   Findings of diffuse cerebral edema with sulcal and basal cistern  effacement. Infarcts involving the bilateral corpus stratum and to  lesser degree thalami. Additional infarcts of the bilateral paramedian  right greater than left parietal lobes.    [Critical Result: Diffuse cerebral edema with multifocal infarcts]    Finding was identified on 10/5/2020 2:45 PM.     Dr. Conway was contacted by Dr. Vasquez at 10/5/2020 2:55 PM and  verbalized understanding of the critical finding.     I have personally reviewed the examination and initial interpretation  and I agree with the findings.    DIMITRI MARCUS MD          Medications     Current Facility-Administered Medications   Medication     albumin human 5 % injection 50 g     albumin human 5 % injection 500 mL     artificial tears ophthalmic ointment     aspirin (ASA) chewable tablet 81 mg     calcium chloride in  mL intermittent infusion 1 g     dextrose 10% infusion     dextrose 10% infusion     glucose gel 15-30 g    Or     dextrose 50 % injection 25-50 mL    Or     glucagon injection 1 mg     fentaNYL (SUBLIMAZE) bolus from infusion pump-ADULT 50 mcg     fentaNYL (SUBLIMAZE) infusion     heparin 2 unit/mL in 0.9% NaCl (for REPERFUSION CATHETER)     heparin 25,000 units in 0.45% NaCl 250 mL ANTICOAGULANT  infusion     influenza quadrivalent (PF) vacc (FLUZONE) injection 0.5 mL     insulin 1 unit/mL in saline (NovoLIN, HumuLIN Regular) drip - ADULT IV Infusion     lactated ringers BOLUS 1,000 mL     lidocaine (LMX4) cream     lidocaine 1 % 0.1-1 mL     magnesium  sulfate 2 g in water intermittent infusion     magnesium sulfate 4 g in 100 mL sterile water (premade)     Medication Considerations - To maintain platelet inhibition after discontinuation of cangrelor (KENGREAL) [see admin instructions]     midazolam (VERSED) drip - ADULT 100 mg/100 mL in NS PRE-MIX     midazolam (VERSED) injection 1-3 mg     multivitamins w/minerals (CERTAVITE) liquid 15 mL     naloxone (NARCAN) injection 0.1-0.4 mg     niCARdipine 40 mg in 200 mL 0.9% NaCl (CARDENE) infusion     norepinephrine (LEVOPHED) 16 mg in  mL infusion     norethindrone (MICRONOR) 0.35 MG per tablet 0.35 mg (CRUSH in CHEMO ROOM)     pantoprazole (PROTONIX) IV push injection 40 mg     piperacillin-tazobactam (ZOSYN) 3.375 g vial to attach to  mL bag     polyethylene glycol (MIRALAX) Packet 17 g     potassium chloride (KLOR-CON) Packet 20-40 mEq     potassium chloride 10 mEq in 100 mL intermittent infusion with 10 mg lidocaine     potassium chloride 10 mEq in 100 mL sterile water intermittent infusion (premix)     potassium chloride 20 mEq in 50 mL intermittent infusion     potassium chloride ER (KLOR-CON M) CR tablet 20-40 mEq     senna-docusate (SENOKOT-S/PERICOLACE) 8.6-50 MG per tablet 1 tablet     sodium chloride (PF) 0.9% PF flush 3 mL     sodium chloride (PF) 0.9% PF flush 3 mL     sodium phosphate 10 mmol in D5W intermittent infusion     sodium phosphate 15 mmol in D5W intermittent infusion     sodium phosphate 20 mmol in D5W intermittent infusion     sodium phosphate 25 mmol in D5W intermittent infusion     ticagrelor (BRILINTA) tablet 90 mg                    I have seen and examined the patient with the CSI team. I agree with the assessment and plan of the note above.I have reviewed pertinent labs.     Joss Pfeiffer MD  Interventional Cardiology  Pager: [phone]

## 2020-10-08 NOTE — PLAN OF CARE
D;Pt remains on V-A GDTM5089/3.5/s0.5/100% circuit.Vent setting unchanged.No responded to stimuli.Minimum cough with ETT SuctionningPupils equal most of nite 3-5mm sluggish to the light.Free H2O at 100cc/hr.Na at 0400 160. Nicardipine gtt up to 12mg/hr.MAP still > 85.Given hydralazine 25mg PO per H.O ordered.Responded somewhat.UO 70-120cc/hr.IABP 1;3.Having loose stools.Placed rectal tube to protect the skin.ST no ectopies.  I;A;Remains unresponsive without any sedations.Bite lock change site Q 4hrs.Had to control BP <MAP 85 with max dose of nicardipine gtt.  P;Continue assess neuro status.Assess the effectiveness of hydralazine plus nicardipine gtts.

## 2020-10-08 NOTE — CONSULTS
Care Management Note    Length of Stay (days) 6    Patient plan of care discussed at Interdisciplinary Rounds: yes  Expected Discharge Date:TBD       Assessment/ Plan:  Pt is in ICU, vented, sedated and with ECMO.    CSI team requested care conf. to be arrange for tomorrow, 10/9 afternoon.  RNCC visited pt spouse, Eben and discussed about the care conf.  Eben agreed to meet tomorrow at 2:00.  Pt parents will be on the speaker phone.  The number to call pt parents is # [phone].  Team members that have been informed about the care conf. are; CSI, Palliative, bedside RN and charge nurse.  RNCC will cont to follow plan of care.    Jarocho Garcia RN, PHN, BSN  4A and 4E/ ICU  Care Coordinator  Phone: [phone]  Pager: [phone]

## 2020-10-08 NOTE — PROGRESS NOTES
Care Management Initial Consult    General Information  Assessment completed with:: Spouse or significant other, Eben  Type of CM/SW Visit: Initial Assessment  Primary Care Provider verified and updated as needed?: Yes           Advance Care Planning: Advance Care Planning Reviewed: no concerns identified, questions answered, palliative care discussed  (Spouse is decision maker per [facility] policy)       Communication Assessment  Patient's communication style: spoken language (English or Bilingual)  Hearing Difficulty or Deaf: no(questions answered by patient's ) Wear Glasses or Blind: no    Cognitive  Cognitive/Neuro/Behavioral: .WDL except  Level of Consciousness: unresponsive  Arousal Level: unresponsive  Orientation: other (see comments)(GERALD)  Mood/Behavior: other (see comments)(GERALD)  Best Language: (intubated)  Speech: endotracheal tube    Living Environment:   People in home: significant other  Name(s) of People in Home: Eben  Current living Arrangements: house          Family/Social Support:  Care provided by: self  Provides care for: no one  Marital Status:   Who is your support system?:   Spouse's Name: (Eben)  Description of Support System: Supportive  Description of Support System: Supportive  Adequate family and caregiver support, Adequate social supports        Description of Support System: Supportive  Support Assessment: Adequate family and caregiver support, Adequate social supports    Current Resources:   Skilled Home Care Services:    Community Resources:    Equipment currently used at home: none  Supplies currently used at home:      Employment:  Employment Status: employed full-time     Employment/ Comments: ( at [employer])  Financial/Environmental Concerns: (None identified )  Referral to Financial Counselor: (No conerns)  (No concerns)    Lifestyle & Psychosocial Needs:      Per spouse, Pt is active, hard working and possesses a strong  star. Pt has no psychosocial needs- supportive need for family.           Functional Status:  Prior to admission patient needed assistance:          Mental Health Status:        Mental Health Status: (None )  Mental Health Management: (None)  Chemical Dependency Status: (None)                Values/Beliefs:  Spiritual, Cultural Beliefs, Oriental orthodox Practices, Values that affect care: yes  (Per spouse, Pt is highly involved in Mandaeism and prays daily.)  Cultural/Oriental orthodox Practices Patient Routinely Participates In: ([employer])    ( is following. Pt is very involved with star)    Additional Information:  SW completed assessment via phone with spouse Eben. No discharge needs identified at this time. Eben noted Pt hospitalization has been difficult family and unexpected given her age and active lifestyle. SW provided normalization of feeling and provided counseling for the grief Eben is feeling.     Janeth Belcher LGSW

## 2020-10-08 NOTE — PROGRESS NOTES
ECMO Shift Summary: 12D      ECMO Equipment:  Console serial number: [id number]  Circuit Lot number: [id number]  Oxygenator Lot number: [id number]    Patient remains on [facility] ECMO, all equipment is functioning and alarms are appropriately set. RPM's 3400 with flow range 3.5-3.6 L/min. Sweep gas is at 0.5 LPM and FiO2 100%. Circuit remains free of air, clot and fibrin. Cannulas are secure with no bleeding from site. Extremities are warm.    Significant Shift Events: none    Vent settings:  Ventilation Mode: CMV/AC  (Continuous Mandatory Ventilation/ Assist Control)  FiO2 (%): 60 %  Rate Set (breaths/minute): 10 breaths/min  Tidal Volume Set (mL): 400 mL  PEEP (cm H2O): 7 cmH2O  Oxygen Concentration (%): 60 %  Resp: 13  .    Heparin is running at 1250 u/hr, ACT range 166-171.    Urine output is per RN charting, blood loss was minimal.  One RBC given.      Intake/Output Summary (Last 24 hours) at 10/8/2020 1735  Last data filed at 10/8/2020 1600  Gross per 24 hour   Intake 4404.1 ml   Output 2565 ml   Net 1839.1 ml       ECHO:  No results found for this or any previous visit.No results found for this or any previous visit.    CXR:  Recent Results (from the past 24 hour(s))   XR Chest Port 1 View    Narrative    EXAM: XR CHEST PORT 1 VW  10/8/2020 2:00 AM     HISTORY:  Check endotracheal tube placement and ECLS cannula  placement. DO NOT log-roll patient.  Place film under patient using  patient safety handling process.       COMPARISON:  Chest x-ray 10/7/2020.    FINDINGS: AP radiograph of the chest. Endotracheal tube projecting 4.7  cm cephalad to the thu. Inferior approach ECMO cannula with the tip  overlying the mid SVC. Intra-aortic balloon pump marker near the level  of the thu. Partial visualization of NG/OG with the tip overlying  the stomach directed towards the fundus.    Stable cardial mediastinal silhouette. Small left pleural effusion. No  pneumothorax. Improved mixed interstitial and airspace  opacities  throughout the lungs. Visualized upper abdomen is unremarkable. The  bones are stable.      Impression    IMPRESSION:  1. Endotracheal tube overlying the mid thoracic trachea. Stable  additional supportive devices.  2. Decreased mixed interstitial and airspace opacities throughout the  lungs.  3. Small left pleural effusion.    I have personally reviewed the examination and initial interpretation  and I agree with the findings.    JEZ ENG MD       Labs:  Recent Labs   Lab 10/08/20  1554 10/08/20  1407 10/08/20  1204 10/08/20  1002 10/08/20  0335 10/08/20  0335   PH 7.38 7.39 7.38 7.39   < > 7.37   PCO2 37 36 35 36   < > 38   PO2 96 102 112* 84   < > 74*   HCO3 22 21 21 22   < > 22   O2PER 60 60.0  --  60.0  --  60    < > = values in this interval not displayed.       Lab Results   Component Value Date    HGB 7.3 (L) 10/08/2020    PHGB <30 10/08/2020    PLT 78 (L) 10/08/2020    FIBR 535 (H) 10/08/2020    INR 1.49 (H) 10/08/2020    PTT 58 (H) 10/08/2020    DD 9.0 (H) 10/08/2020    AXA 0.58 10/08/2020    ANTCH 78 (L) 10/08/2020         Plan is to remain stable on VA ECMO.      Brock Dobbins, RT  ECMO Specialist  10/8/2020 5:35 PM

## 2020-10-08 NOTE — PROGRESS NOTES
[facility]   WOC Nurse Inpatient Adult Pressure Injury Prevention Assessment: ECMO  Initial     Positioning Tolerance: Fair  Date of ECMO cannulation: 10/2  Presence of Ischemia: No  Location of ischemia: n/a    Pressure Injury Prevention Interventions In Place:  Optifoam Dressing under ECMO Cannula, Z flow Positioner under head, Pillows for repositioning, TAPs Wedge Positioners in use, Heel off-loading boots, Pillows under calves for heel suspension and Mepilex Sacral Dressing   Current support surface: Standard  Low air loss mattress       Pressure Injury Prevention Interventions Added:  None        Plan of Care for Positioning and Pressure Injury Prevention  Reposition patient every 1-2 hours using TAP Wedges  Position head on Z flow positioner, mold indentation at areas of pressure points.  Pad ECMO IJ cannula with Optifoam (#[id number]) along face and scalp between skin and cannula and under Coban head wraps    Pad ECMO groin and chest cannula under rigid connectors with Optifoam or Soft cloth  Heel off-loading Boots at all times  Sacral Mepilex for Prevention, change every 5 days and prn  Low Air loss mattress    Patient History:   According to medical record:  Sachi Bolanos is a 44 year old female who was cannulated for ECMO 10/2/2020 due to refractory VF arrest       Current Diet / Nutrition:     Orders Placed This Encounter      NPO for Medical/Clinical Reasons Except for: No Exceptions      Output:    I/O last 3 completed shifts:  In: 3844.31 [I.V.:1874.31; NG/GT:1970]  Out: 2375 [Urine:2275; Stool:100]  Containment: of urine/stool: Urinary Catheter    Risk Assessment:   Sensory Perception: 1-->completely limited  Moisture: 3-->occasionally moist  Activity: 1-->bedfast  Mobility: 1-->completely immobile  Nutrition: 1-->very poor  Friction and Shear: 1-->problem  Justus Score: 8    Labs:    Recent Labs   Lab 10/08/20  0806 10/08/20  0335 10/08/20  0335  10/02/20  1556 10/02/20  1556   ALBUMIN  --   --  2.6*   < > 2.3*   HGB 11.9   < > 7.3*   < > 9.7*   INR  --   --  1.47*   < > 3.53*   WBC  --   --  17.3*   < > 19.7*   A1C  --   --   --   --  5.3   CRP  --   --  50.0*   < > <2.9    < > = values in this interval not displayed.       Focused Assessment: bilateral Right groin ECMO cannula, Left groin ECMO cannula, Ears, Face and Feet    Pressure Injury Present::No   Tongue high risk for pressure injury formation due to swelling and trauma.  Bite block removed and using molar blocks.  Bruising due to trauma noted.   10/8: Using bite block again. Bruising is improving though tongue is dry.  Dried drainage on right tongue. Swelling is much improved.       right    left                Education provided to: nurse  Discussed importance of:their role in pressure injury prevention  Discussed plan of care with Nurse  WOC Nurse follow-up plan:later this week    Deborah Chamorro RN CWOCN

## 2020-10-08 NOTE — PROGRESS NOTES
Disregard ACT of 158 resulted today @ 3513.  Pt scanned in error.  Correct ACT is 171 drawn @ 9465

## 2020-10-08 NOTE — PROGRESS NOTES
.  Critical Care Cardiology   Progress Note  Kathi Rehman MRN: [id number]  Age: 45 year old, : 1975  Date: 10/6/2020      History of Present Illness   Kathi Rehman is a 45 year old female with PMHx of PCOS presented to [facility] on 10/2/2020 following out of hospital refractory VF cardiac arrest. Patient was reportedly going to urgent care when she collapsed getting out of the car. Clinic staff performed immediate bystander CPR. Initial rhythm was VF. Brief ROSC for 1 minute but converted to VF again. Patient was shocked a total of 7 times, received 1 mg epi, and 300 mg amiodarone. She was transferred to [facility] CCL approximately 1 hour after arrest on CARMINA and in asystole. Initial labs notable for PCO2 61, PO2 69, andlactate of 19. Patient was placed on VA ECMO and underwent coronary angiogram which revealed thrombotic occlusion of the proximal LAD with minimal CAD otherwise. Pt underwent PCI w/ THELMA of pLAD. Cooling catheter was placed for TTM and pt was transferred to CVICU for post-arrest care.      Subjective/Interval Events   Added hydralazine for ongoing HTN. Neuro status unchanged; unresponsive, overbreathing the vent, + cough. Pupils intermittently reactive.     Assessment and Plan   Today's plan:  -discontinue EEG   -keep Na 155-160   -goals of care discussion w/  tomorrow   ------------------  Neurology:   Anoxic brain injury w/ diffuse cerebral edema and sulcal/basal cistern effacement   Scattered infarcts  Intubated. Cooled to initially to 34 degrees; normothermic since 10/4 at 0900. Pupils ~4 mm, and intermittently reactive. Pt is unresponsive although does have cough and gag reflexes.   Imaging/procedures:   CTH 10/6: Sequela of anoxic event seen as bilateral deep gray nuclei ischemic changes and also ischemic changes involving some of the adjacent white matter and parietal cortices as described above. Diffuse intracranial edema with resultant effacement of the sulci, ventricular system,  basal cisterns and downward tonsillar herniation. When compared with 10/5/2020 dated study, the sulcal effacement, basal cisternal effacement and ventricular effacement have slightly decreased.  EEG 10/6: worsening encephalopathy, no seizures   Sedation/paralytics:   None  Plan:   -per discussion w/ pt's , will allow more time for recovery   -goals of care discussion w/ pt's  tomorrow   -Neurocritical Care following      Cardiovascular:   OOHCA   Thrombotic occlusion of pLAD s/p PCI w/ THELMA   Ischemic cardiomyopathy (EF 45-50%)  Refractory VF arrest s/p peripheral VA ECMO insertion w/ distal reperfusion catheter. Thrombotic occlusion of pLAD w/ minimal CAD otherwise; s/p PCI w/ THELMA of pLAD.  MCS:   VA ECMO 3.3400/0.5/100% - ACT goal: 160-180  IABP 1:1 - pulsatility when paused   Imaging/procedures:   Coronary angiogram 10/2: thrombotic occlusion of pLAD w/ minimal CAD otherwise; s/p PCI w/ THELMA of pLAD  TTE 10/4: VA ECMO turndown from 3.4 to 1.5L with the IABP off. Baseline: Mildly (EF 40-45%) reduced left ventricular function is present. Apical wall akinesis is present. With turndown, LV fxn improves to 45-50%. Right ventricular function, chamber size, wall motion, and thickness are normal. No pericardial effusion is present. Compared to prior, LV fxn is improved.  EKG 10/8: SR 90s, anterolateral Q waves w/ improving persistent Lottie, inferior ST depression, QTc 490   Vasoactive/antiarrhythmic infusions:   Nicardipine    Plan:   -drawing ABGs from RRA  -wean nicardipine as able  -ACT goal 160-180  -continue ASA and ticagrelor  -continue hydralazine   -hold ACE/ARB given ROGERS  -hold beta blocker given shock   -hold statin given hepatic injury after arrest    Pulmonary:  Acute hypoxic respiratory failure   Aspiration pneumonia   Vent settings:   CMV 10/400/7/60% - AB.40/33/94/21  Imaging/procedures:   CXR 10/8: Improved diffuse opacities. Lines in stable position.   CT chest 10/2: Small bilateral  pleural effusions with consolidative opacities in the dependent lung fields with diffuse scattered nodular ground glass opacities and smooth interlobular septal thickening. Overall findings suggestive of pulmonary edema underlying infection is not entirely  excluded.  Plan:   -overbreathing vent   -wean vent as able  -daily CXR  -Q2h ABGs for now  -consider scheduled duonebs if signs of lung dz, currently PRN     GI and Nutrition:   Acute liver injury, improving   Imaging/procedures:   CT abd/pelvis 10/2: Relatively featureless-appearing bowel which is a nonspecific finding but can be seen in patient's with shock bowel. No pneumatosis, portal venous gas, or pneumoperitoneum.   Plan:   -hold TF for now   -monitor BID LFTs  -continue bowel regimen   -GI prophylaxis: protonix     Renal, Fluid, and Electrolytes:   Acute kidney injury  Permissive hypernatremia   Benign right adrenal adenoma on CT   Received mannitol 10/5 after CTH and was started on 3% Na infusion to keep Na 155-160. Infusion off since 10/6 d/t Na >160. Creatinine 2.25, BUN 60. 2.3L UOP yesterday and net positive 1.2L.   Plan:   -Na 161 this AM; continue FWF to keep Na 155-160  -3% Na infusion if needed to keep Na 155-160  -avoid hypotonic solutions  -monitor urine output  -maintain K>3.8 and Mg>2    Infectious Disease:   Aspiration pneumonia   Leukocytosis   WBC 17.3; afebrile. No signs of infection. Leukocytosis c/w arrest. Blood and sputum cultures collected - NGTD.   Plan:   -treated initially w/ vanc/zosyn x5 days for aspiration pneumonia   -daily blood cultures while on ECMO   -monitor for signs of infection given cooling, lines, and leukocytosis    Hematology and Oncology:   Acute blood loss anemia   Thrombocytopenia   Right RP hematoma   Right inguinal subcutaneous hematoma   Hgb 7.3, plt 80. INR 1.49. Receiving heparin for ECMO and ASA/ticagrelor for THELMA. Requiring intermittent PRBC transfusions.   Plan:   -heparin gtt for ECMO with ACT goal  "160-180  -cryo PRN for fibrinogen < 200; FFP for INR >2  -transfuse for hgb <8  -US LE arterial per protocol w/ patent bilateral lower extremity arteries   -DVT prophylaxis: heparin gtt    Endocrine:   Hyperglycemia   No known medical history. BG elevated in setting of critical illness.  Plan:   -insulin gtt  -hgb A1c: 5.3%    Integumentary   Tongue abrasion and swelling, likely secondary to biting tongue with arrest and intubation.   -ENT consulted; orders for bite block and keep oral tongue moistened   -[facility] nurse following    Lines:   R femoral arterial ECMO cannula October 2, 2020  L femoral venous ECMO cannula October 2, 2020   L femoral arterial line (IABP) October 2, 2020  R femoral venous line (Thermogard) October 2, 2020  R radial arterial line October 2, 2020  ETT October 2, 2020  Babcock catheter October 2, 2020  OG tube October 2, 2020  Restraint: not needed    Current lines are required for patient management      Family update by me today: Yes     Code Status: Full code    The pt was discussed and evaluated with Dr. Pfeiffer, attending physician, who agrees with the assessment and plan above.     Rosa Clay, SAMAN APRN CNP   Critical Care Cardiology   [phone]     Objective     Pulse 106   Temp 98.8  F (37.1  C)   Resp 18   Ht 1.676 m (5' 6\")   Wt 95.8 kg (211 lb 3.2 oz)   SpO2 96%   BMI 34.09 kg/m    Temp:  [97.5  F (36.4  C)-98.8  F (37.1  C)] 98.8  F (37.1  C)  Pulse:  [102-111] 106  Resp:  [12-22] 18  MAP:  [76 mmHg-90 mmHg] 86 mmHg  Arterial Line BP: (101-120)/(47-65) 110/65  FiO2 (%):  [60 %] 60 %  SpO2:  [96 %-100 %] 96 %  Wt Readings from Last 2 Encounters:   10/08/20 95.8 kg (211 lb 3.2 oz)     I/O last 3 completed shifts:  In: 3521.31 [I.V.:2081.31; NG/GT:1140]  Out: 2220 [Urine:2220]    GENERAL APPEARANCE: Intubated. Unresponsive.   HEENT: No icterus, pupils 4 mm and intermittently reactive, ETT in place, OG tube in place.  CARDIOVASCULAR: tachycardic, normal S1/S2, no S3/S4 and " no murmur, click, or rub. Normal PMI. Dorsalis pedis pulses dopplerable.  RESP: Coarse bilaterally. Mechanical ventilation.    GASTRO: Abdomen soft, bowel sounds hypoactive but present.  GENITOURINARY: Babcock in place.  EXTREMITIES: Cool, +1 edema, DP pulses dopplerable. VA ECMO cannulas in right and left groin, ThermoGard in right groin, and IABP in left groin.   NEURO: Intubated. Pupils 4 mm and intermittently reactive. Unresponsive.   INTEGUMENTARY: No rashes. RFA and LFV cannulae sites oozing; otherwise line sites CDI.   LINES/TUBES/DRAINS: VA ECMO cannulas R and L groin, IABP L groin, and ThermoGard in R groin. R radial arterial line. ETT. OG. Babcock Catheter.     Data     Vent Settings:  Resp: 18 SpO2: 96 % O2 Device: Mechanical Ventilator      Arterial Blood Gas:   Recent Labs   Lab 10/08/20  0557 10/08/20  0335 10/08/20  0208 10/08/20  0008 10/07/20  1607 10/07/20  1607 10/07/20  0958 10/07/20  0958 10/07/20  0344 10/07/20  0344   PH 7.40 7.37 7.39 7.43   < > 7.46*   < > 7.46*   < > 7.47*   PCO2 35 38 35 32*   < > 30*   < > 30*   < > 30*   PO2 73* 74* 74* 89   < > 104   < > 116*   < > 84   HCO3 22 22 21 21   < > 21   < > 21   < > 22   O2PER  --  60  --   --   --  60  100  60  --  60  --  60    < > = values in this interval not displayed.       Vitals:    10/06/20 0545 10/07/20 0400 10/08/20 0400   Weight: 96.2 kg (212 lb 1.3 oz) 95.4 kg (210 lb 5.1 oz) 95.8 kg (211 lb 3.2 oz)   I/O last 3 completed shifts:  In: 3521.31 [I.V.:2081.31; NG/GT:1140]  Out: 2220 [Urine:2220]  Recent Labs   Lab 10/08/20  0557 10/08/20  0335 10/07/20  2146 10/07/20  2146   * 160*   < > 164*   POTASSIUM 4.5 4.5   < > 4.5   CHLORIDE  --  132*  --  136*   CO2  --  23  --  21   ANIONGAP  --  5  --  7   * 122*  130*   < > 124*   BUN  --  60*  --  59*   CR  --  2.25*  --  2.25*   CARMELO  --  7.7*  --  7.5*    < > = values in this interval not displayed.     No components found for: URINE   Recent Labs   Lab 10/08/20  0335  10/07/20  2146 10/07/20  1607   * 236* 198*   ALT 97* 94* 88*   BILITOTAL 0.9 1.2 1.3   ALBUMIN 2.6* 2.6* 2.5*   PROTTOTAL 5.6* 5.8* 5.8*   ALKPHOS 101 107 99     Temp: 98.8  F (37.1  C) Temp src: EsophagealTemp  Min: 97.5  F (36.4  C)  Max: 98.8  F (37.1  C)   Recent Labs   Lab 10/08/20  0557 10/08/20  0335 10/08/20  0208 10/08/20  0008 10/07/20  2355 10/07/20  2146 10/07/20  2146 10/07/20  1607 10/07/20  1607 10/07/20  0958 10/07/20  0958 10/07/20  0344 10/07/20  0344   WBC  --  17.3*  --   --   --   --  17.5*  --  17.5*  --  19.2*  --  20.5*   HGB 7.8* 7.3* 8.2* 8.8* 7.5*   < > 7.7*   < > 7.8*   < > 8.0*   < > 8.0*   HCT  --  22.9*  --   --   --   --  23.8*  --  23.8*  --  24.1*  --  24.5*   MCV  --  95  --   --   --   --  93  --  92  --  92  --  92   RDW  --  16.0*  --   --   --   --  15.9*  --  15.7*  --  15.7*  --  15.2*   PLT  --  80*  --   --   --   --  83*  --  76*  --  80*  --  80*    < > = values in this interval not displayed.     Recent Labs   Lab 10/08/20  0335 10/07/20  2146 10/07/20  1607 10/07/20  0958 10/07/20  0344   INR 1.47* 1.46* 1.43* 1.46* 1.43*   PTT 58* 59* 57* 53* 46*     Recent Labs   Lab 10/08/20  0557 10/08/20  0335 10/08/20  0208 10/08/20  0008 10/07/20  2153   * 122*  130* 124* 124* 124*       All imaging personally reviewed:  Recent Results (from the past 24 hour(s))   CT Head w/o Contrast   Result Value    Radiologist flags Diffuse cerebral edema with multifocal infarcts (AA)    Narrative    CT HEAD W/O CONTRAST 10/5/2020 2:36 PM    Provided History: s/p Cardiac arrest, on ECMO, pupils fixed/dilated    Comparison: CT head 10/2/2020.    Technique: Using multidetector thin collimation helical acquisition  technique, axial, coronal and sagittal CT images from the skull base  to the vertex were obtained without intravenous contrast.     Findings:    There is diffuse loss of the gray-white differentiation. Significant  hypodensities are present in the bilateral basal  ganglia, most  pronounced involving the bilateral corpus striatum. There is  additional involvement of the bilateral thalami, the bilateral  paramedian posterior parietal lobes and the posteroinferior temporal  lobes.    There is no hydrocephalus. Moderate effacement of the fourth  ventricle. Basal cisterns are effaced.    There is no depressed calvarial fracture. Air-fluid levels noted in  paranasal sinuses and mastoid air cells.       Impression    Impression:   Findings of diffuse cerebral edema with sulcal and basal cistern  effacement. Infarcts involving the bilateral corpus stratum and to  lesser degree thalami. Additional infarcts of the bilateral paramedian  right greater than left parietal lobes.    [Critical Result: Diffuse cerebral edema with multifocal infarcts]    Finding was identified on 10/5/2020 2:45 PM.     Dr. Conway was contacted by Dr. Vasquez at 10/5/2020 2:55 PM and  verbalized understanding of the critical finding.     I have personally reviewed the examination and initial interpretation  and I agree with the findings.    DIMITRI MARCUS MD          Medications     Current Facility-Administered Medications   Medication     albumin human 5 % injection 50 g     albumin human 5 % injection 500 mL     artificial tears ophthalmic ointment     aspirin (ASA) chewable tablet 81 mg     calcium chloride in  mL intermittent infusion 1 g     dextrose 10% infusion     dextrose 10% infusion     glucose gel 15-30 g    Or     dextrose 50 % injection 25-50 mL    Or     glucagon injection 1 mg     heparin 2 unit/mL in 0.9% NaCl (for REPERFUSION CATHETER)     heparin 25,000 units in 0.45% NaCl 250 mL ANTICOAGULANT  infusion     hydrALAZINE (APRESOLINE) tablet 25 mg     influenza quadrivalent (PF) vacc (FLUZONE) injection 0.5 mL     insulin 1 unit/mL in saline (NovoLIN, HumuLIN Regular) drip - ADULT IV Infusion     lactated ringers BOLUS 1,000 mL     lidocaine (LMX4) cream     lidocaine 1 % 0.1-1 mL      magnesium sulfate 2 g in water intermittent infusion     magnesium sulfate 4 g in 100 mL sterile water (premade)     Medication Considerations - To maintain platelet inhibition after discontinuation of cangrelor (KENGREAL) [see admin instructions]     midazolam (VERSED) injection 1-3 mg     multivitamins w/minerals (CERTAVITE) liquid 15 mL     naloxone (NARCAN) injection 0.1-0.4 mg     niCARdipine (CARDENE) 100 mg in sodium chloride 0.9 % 250 mL infusion     norethindrone (MICRONOR) 0.35 MG per tablet 0.35 mg (CRUSH in CHEMO ROOM)     pantoprazole (PROTONIX) IV push injection 40 mg     polyethylene glycol (MIRALAX) Packet 17 g     potassium chloride (KLOR-CON) Packet 20-40 mEq     potassium chloride 10 mEq in 100 mL intermittent infusion with 10 mg lidocaine     potassium chloride 10 mEq in 100 mL sterile water intermittent infusion (premix)     potassium chloride 20 mEq in 50 mL intermittent infusion     potassium chloride ER (KLOR-CON M) CR tablet 20-40 mEq     senna-docusate (SENOKOT-S/PERICOLACE) 8.6-50 MG per tablet 1 tablet     sodium chloride (PF) 0.9% PF flush 3 mL     sodium chloride (PF) 0.9% PF flush 3 mL     sodium phosphate 10 mmol in D5W intermittent infusion     sodium phosphate 15 mmol in D5W intermittent infusion     sodium phosphate 20 mmol in D5W intermittent infusion     sodium phosphate 25 mmol in D5W intermittent infusion     ticagrelor (BRILINTA) tablet 90 mg       I have seen and examined the patient with the [facility] team. I agree with the assessment and plan of the note above.I have reviewed pertinent labs.     Joss Pfeiffer MD  Interventional Cardiology  Pager: [phone]

## 2020-10-08 NOTE — PROGRESS NOTES
ECMO Shift Summary:      ECMO Equipment:  Console serial number: [id number]  Circuit Lot number: [id number]  Oxygenator Lot number: [id number]      Patient remains on VA ECMO, all equipment is functioning and alarms are appropriately set. RPM's 3400 with flow range 3.46-3.62 L/min. Sweep gas is at 0.5 LPM and FiO2 100%. Circuit remains free of air, clot. Fibrin noted in cannula connectors. Cannulas are secure with minimal bleeding from site. Extremities are warm.     Significant Shift Events: None    Vent settings:  Ventilation Mode: CMV/AC  (Continuous Mandatory Ventilation/ Assist Control)  FiO2 (%): 60 %  Rate Set (breaths/minute): 10 breaths/min  Tidal Volume Set (mL): 400 mL  PEEP (cm H2O): 7 cmH2O  Oxygen Concentration (%): 60 %  Resp: 13  .    Heparin is running at 1250 u/hr, ACT range 171-179.    Urine output is >100 ml/hr, blood loss was minimal. No product given.      Intake/Output Summary (Last 24 hours) at 10/8/2020 0408  Last data filed at 10/8/2020 0400  Gross per 24 hour   Intake 3696.31 ml   Output 2280 ml   Net 1416.31 ml       ECHO:  No results found for this or any previous visit.No results found for this or any previous visit.    CXR:  No results found for this or any previous visit (from the past 24 hour(s)).    Labs:  Recent Labs   Lab 10/08/20  0335 10/08/20  0208 10/08/20  0008 10/07/20  2153 10/07/20  1607 10/07/20  1607 10/07/20  0958 10/07/20  0958 10/07/20  0344 10/07/20  0344   PH 7.37 7.39 7.43 7.48*   < > 7.46*   < > 7.46*   < > 7.47*   PCO2 38 35 32* 28*   < > 30*   < > 30*   < > 30*   PO2 74* 74* 89 102   < > 104   < > 116*   < > 84   HCO3 22 21 21 21   < > 21   < > 21   < > 22   O2PER 60  --   --   --   --  60  100  60  --  60  --  60    < > = values in this interval not displayed.       Lab Results   Component Value Date    HGB 8.2 (L) 10/08/2020    PHGB 30 (H) 10/07/2020    PLT 83 (L) 10/07/2020    FIBR 540 (H) 10/07/2020    INR 1.46 (H) 10/07/2020    PTT 59 (H) 10/07/2020    DD  18.4 (H) 10/07/2020    AXA 0.57 10/07/2020    ANTCH 69 (L) 10/07/2020         Plan is possible decannulation.      Va Holt RN  ECMO Specialist  10/8/2020 4:08 AM

## 2020-10-09 NOTE — PROGRESS NOTES
ECMO Shift Summary:      ECMO Equipment:  Console serial number: [id number]  Circuit Lot number: [id number]  Oxygenator Lot number: [id number]      Patient remains on VA ECMO, all equipment is functioning and alarms are appropriately set. RPM's 8472-6160 with flow range 3.66-4.42 L/min. Sweep gas is at 0.5 LPM and FiO2 60%. Circuit remains free of air, clot and fibrin. Cannulas are secure with moderate oozing from right femoral cannula site. Extremities are warm.     Significant Shift Events:   Patient had desaturation to the 80's which did not improve with increase in Fio2 and suctioning. Chest xray was done which showed right upper lobe collapse. Bronch was done at bedside after which oxygen saturation improved. Peep was increased from 7 to 10 after bronch.     At the same time that patient was having desaturation, patient's BP was decreasing and Nicardipine drip was turned off. Since then, MAPs have been in the low 60's. HR has decreased from 80's-90's to 50's-60's. IABP was changed from 1:3 to 1:1. ECMO flow increased to 4.4 L. Dr. Soto was notified of arterial and internal pressures in the 300's.    Vent settings:  Ventilation Mode: CMV/AC  (Continuous Mandatory Ventilation/ Assist Control)  FiO2 (%): 60 %  Rate Set (breaths/minute): 10 breaths/min  Tidal Volume Set (mL): 400 mL  PEEP (cm H2O): 7 cmH2O  Oxygen Concentration (%): 60 %  Resp: 10  .    Heparin is running at 1250 u/hr, ACT range 162-175.    Urine output is >70 ml/hr, blood loss was minimal. Product given included 2 units of PRBC.      Intake/Output Summary (Last 24 hours) at 10/9/2020 0402  Last data filed at 10/9/2020 0400  Gross per 24 hour   Intake 4497.72 ml   Output 2190 ml   Net 2307.72 ml       ECHO:  No results found for this or any previous visit.No results found for this or any previous visit.    CXR:  Recent Results (from the past 24 hour(s))   XR Chest Port 1 View   Result Value    Radiologist flags Probable right upper lobar Bronchial  plugging (Urgent)    Narrative    XR CHEST PORT 1 VW  10/8/2020 10:23 PM      HISTORY: Evaluate ET tube placement    COMPARISON: Chest x-ray 10/8/2020, 10/7/2020    TECHNIQUE: Supine frontal view of the chest    FINDINGS: Complete collapse of the right upper lobe with a few air  bronchograms. Remainder of the lungs appear clear. No pneumothorax or  significant pleural effusion. Heart size is normal. Trachea is  midline. Upper abdomen is unremarkable. No suspicious osseous lesion.    Endotracheal tube tip projects over the midthoracic trachea. ECMO  cannula from inferior approach with tip projecting over the mid SVC.  Intra-aortic balloon pump radio opaque marker is just below the aortic  knob. Enteric tube projecting over the stomach.      Impression    IMPRESSION:   1. New complete collapse of the right upper lobe compared to same day  x-ray at 0150 hours. Endobronchial plugging is suspected. Consider  bronchoscopy.  2. Endotracheal tube tip projects over the midthoracic trachea. Other  support devices are stable.    [Urgent Result: Probable right upper lobar Bronchial plugging]    Finding was identified on 10/8/2020 10:27 PM.     Dr. Soto was contacted by Dr. Brown at 10/8/2020 10:31 PM and  verbalized understanding of the urgent finding.     I have personally reviewed the examination and initial interpretation  and I agree with the findings.    MIKE CHURCH MD       Labs:  Recent Labs   Lab 10/09/20  0209 10/08/20  2354 10/08/20  2159 10/08/20  1954   PH 7.35 7.37 7.27* 7.35   PCO2 39 38 47* 38   PO2 109* 195* 63* 99   HCO3 22 22 22 21   O2PER 60.0 100.0 60 60       Lab Results   Component Value Date    HGB 8.0 (L) 10/08/2020    PHGB <30 10/08/2020    PLT 84 (L) 10/08/2020    FIBR 535 (H) 10/08/2020    INR 1.48 (H) 10/08/2020    PTT 65 (H) 10/08/2020    DD 9.0 (H) 10/08/2020    AXA 0.60 10/08/2020    ANTCH 78 (L) 10/08/2020         Plan is continue on VA ECMO. Care conference today at  1400.      Va Holt RN  ECMO Specialist  10/9/2020 4:02 AM

## 2020-10-09 NOTE — PROGRESS NOTES
"[facility]  Palliative Care Daily Progress Note       Recommendations & Counseling       Provided support for Eben.  Encouraged  support for him and patient's parents, should they request same.         Geri Dumont MD  Palliative Care Fellow  Patient seen and examined with Sherin Moss MD, Palliative Care Staff    Patient seen and evaluated with Dr. Dumont.   Agree with assessment and recommendations.      Sophy Ajay  Palliative Care   Pager [phone]     Assessments          44 y/o woman with no past medical history who presented after OOH cardiac arrest 2/2 proximal LAD lesion.  Severe anoxic brain injury.  Plan had been potentially to withdraw care if no improvement today, proceeded to brain death.    Today, the patient was seen for:  Support for Eben.    Prognosis, Goals, or Advance Care Planning was addressed today with: No.  Mood, coping, and/or meaning in the context of serious illness were addressed today: No.  Summary/Comments:            Interval History:     Chart review/discussion with unit or clinical team members:   Patient examined in 10/9 am, found to have fixed pupils and absent brainstem reflexes.      Per patient or family/caregivers today:  Talked with Eben, patient's , at bedside.  He understands \"brain death is death.\"  Waiting for her parent's to come to bedside to say goodbye, discussing organ donation.    Key Palliative Symptoms:  We are not helping to manage these symptoms currently in this patient.    Patient is on opioids: bowels not assessed today.           Review of Systems:     Unable          Medications:     I have reviewed this patient's medication profile and medications during this hospitalization.    Noted meds:  N/A           Physical Exam:   Vitals were reviewed  Temp: 98.2  F (36.8  C) Temp src: Esophageal   Pulse: 90   Resp: 10 SpO2: 99 % O2 Device: Mechanical Ventilator  "   Patient unresponsive, off all sedation, on ventilator             Data Reviewed:     Reviewed recent pertinent imaging, comments:   N/A    Reviewed recent labs, comments:   N/A

## 2020-10-09 NOTE — PROGRESS NOTES
Major Shift Events:  2 unit of RBC overnight for Hgb less than 7. HR continues to decrease overnight, currently 60bpm. Nicardipine weaned off and IABP back to 1:1 100% augmentation. Currently MAP 60-65. Urine output has decreased to 20cc/hour. MD aware. Bronch completed overnight(see previous note).     Plan: care conference today at 1400.    For vital signs and complete assessments, please see documentation flowsheets.

## 2020-10-09 NOTE — DEATH PRONOUNCEMENT
NP DEATH PRONOUNCEMENT    Called to pronounce Kathi Rehman dead.    Physical Exam: Unresponsive to noxious stimuli, Pupillary light reflex absent and Corneal blink reflex absent. Patient continues to be support with ventilator, VA ECMO, and IABP as organ donation candidacy is determined.     NM brain scan showed no evidence of brain perfusion consistent with brain death. Time of death: 2:53 PM on 10/9/20.     Preliminary Cause of Death: brain death, multi-system organ failure     Active Problems:  Anoxic brain injury   Out-of-hospital cardiac arrest   Thrombotic occlusion of pLAD s/p PCI   Ischemic cardiomyopathy   Acute hypoxic respiratory failure   Aspiration pneumonia   Acute liver injury   Acute kidney injury   Acute blood loss anemia      Infectious disease present?: YES    Communicable disease present? (examples: HIV, chicken pox, TB, Ebola, CJD) :  NO    Multi-drug resistant organism present? (example: MRSA): NO    Please consider an autopsy if any of the following exist:  NO Unexpected or unexplained death during or following any dental, medical, or surgical diagnostic treatment procedures.   NO Death of mother at or up to seven days after delivery.     NO All  and pediatric deaths.     NO Death where the cause is sufficiently obscure to delay completion of the death certificate.   NO Deaths in which autopsy would confirm a suspected illness/condition that would affect surviving family members or recipients of transplanted organs.     The following deaths must be reported to the 's Office:  NO A death that may be due entirely or in part to any factors other than natural disease (recent surgery, recent trauma, suspected abuse/neglect).   NO A death that may be an accident, suicide, or homicide.     NO Any sudden, unexpected death in which there is no prior history of significant heart disease or any other condition associated with sudden death.   NO A death under suspicious, unusual, or  unexpected circumstances.    NO Any death which is apparently due to natural causes but in which the  does not have a personal physician familiar with the patient s medical history, social, or environmental situation or the circumstances of the terminal event.   NO Any death apparently due to Sudden Infant Death Syndrome.     NO Deaths that occur during, in association with, or as consequences of a diagnostic, therapeutic, or anesthetic procedure.   NO Any death in which a fracture of a major bone has occurred within the past (6) six months.   NO A death of persons note seen by their physician within 120 days of demise.     NO Any death in which the  was an inmate of a public institution or was in the custody of Law Enforcement personnel.   NO  All unexpected deaths of children   NO Solid organ donors   NO Unidentified bodies   NO Deaths of persons whose bodies are to be cremated or otherwise disposed of so that the bodies will later be unavailable for examination;   NO Deaths unattended by a physician outside of a licensed healthcare facility or licensed residential hospice program   NO Deaths occurring within 24 hours of arrival to a health care facility if death is unexpected.    NO Deaths associated with the decedent s employment.   NO Deaths attributed to acts of terrorism.   NO Any death in which there is uncertainty as to whether it is a medical examiner s care should be discussed with the medical investigator.        Body disposition: Autopsy was discussed with family member:  Spouse in person.  Permission for autopsy was declined.  Patient had indicated organ donation on their 's license.

## 2020-10-09 NOTE — PROGRESS NOTES
ECMO Attending Progress Note  10/9/2020    Sachi Bolanos is a 44 year old female who was cannulated for ECMO 10/2/2020 due to refractory VF arrest    Cannulation Site:  15 Fr in the R femoral artery  25 Fr in the L femoral vein    Interval events: maintained hemodynamic stability though increasing pressor requirements overnight.  Unknown neurologic status with concern for worsening.    Physical Exam:  Temp:  [97.5  F (36.4  C)-98.6  F (37  C)] 98.1  F (36.7  C)  Pulse:  [] 94  Resp:  [10-15] 10  MAP:  [60 mmHg-95 mmHg] 88 mmHg  Arterial Line BP: ()/(42-66) 110/57  FiO2 (%):  [60 %] 60 %  SpO2:  [83 %-100 %] 99 %    Intake/Output Summary (Last 24 hours) at 10/9/2020 1712  Last data filed at 10/9/2020 1700  Gross per 24 hour   Intake 4324.41 ml   Output 1265 ml   Net 3059.41 ml      Ventilation Mode: CMV/AC  (Continuous Mandatory Ventilation/ Assist Control)  FiO2 (%): 60 %  Rate Set (breaths/minute): 10 breaths/min  Tidal Volume Set (mL): 400 mL  PEEP (cm H2O): 10 cmH2O  Oxygen Concentration (%): 60 %  Resp: 10       Labs:  Recent Labs   Lab 10/09/20  1554 10/09/20  1211 10/09/20  1008 10/09/20  0759   PH 7.33* 7.32* 7.31* 7.28*   PCO2 39 39 39 43   PO2 112* 109* 94 132*   HCO3 20* 20* 20* 20*   O2PER 60 60 60 60.0      Recent Labs   Lab 10/09/20  1554 10/09/20  1008 10/09/20  0343 10/08/20  2156   WBC 15.3* 15.3* 10.2 14.7*   HGB 7.4* 7.4* 6.7* 8.0*     Creatinine   Date Value Ref Range Status   10/09/2020 3.69 (H) 0.52 - 1.04 mg/dL Final   10/09/2020 3.46 (H) 0.52 - 1.04 mg/dL Final   10/09/2020 3.03 (H) 0.52 - 1.04 mg/dL Final   10/08/2020 2.78 (H) 0.52 - 1.04 mg/dL Final   Blood Flow (Circuit) LPM: 4.12 LPM  Gas Flow  LPM: 1.5 LPM  Gas FiO2   %: 60 %  ACT  (seconds): 145 seconds  Blood Temp  (degrees C): 36.8 C  Pulse Oximetry  (SpO2%): 99 %  Arterial Pressure  mmH mmHg    ECMO Issues including assessments and plan on DOS 10/9/2020:  Neuro: Sedated for mechanical ventilation and ECMO.  No  acute distress.  NIRS stable b/l  RASS goal: -3  CV: Cardiogenic shock.  Hemodynamically stable on moderate epi and low dose norepi  Pulm: Keep vent settings at rest settings as above.  FEN/Renal: Electrolytes stable w/ replacement protocols in place, Cr increasing slightly, UOP stable  Heme: ACT goal: 160-180, Hemoglobin stable after transfusion.  Minimal oozing around the ECMO cannulas now with pressure held and purse string suture in place.  ID: Receiving empiric antibiotics  Cannulae: Position is acceptable on exam and the available imaging.  Distal perfusion cannula is in place and patent.  Extremities are well-perfused.     I have personally reviewed the ECMO flows, oxygenation and CO2 clearance, anticoagulation, and cannula position.  I have also personally assessed the patient's systemic response with hemodynamics, oxygenation, ventilation, and bleeding.       The patient requires continued ECMO support and management in the ICU.  I have discussed patient care and treatment plan with the primary team.  Will determine plan after further neurologic workup.    Luis Rowland MD, PhD  Interventional/Critical Care Cardiology  [phone]    October 9, 2020

## 2020-10-09 NOTE — PROGRESS NOTES
Notified Kathi's , Eben, via telephone of neurological exam changes (loss of brain stem reflexes) and hypotension requiring increasing pressors. He will notify Kathi's parents and they all will come to the hospital as soon as possible.     Rosa Clay NP

## 2020-10-09 NOTE — PROGRESS NOTES
2200: patient has a drop in sp02 from 97-98 to 86-88. Rapidly came off of Nicardipine at this time as well. Patient on 100% ecmo circuit and 60% vent. Multiple ET tube suction attempts with no improvement. MD notified and CXR obtained. Deep suction with RT performed at this time as well with minor improvement. XR showed R upper lobe collapse and bedside bronch performed by intensivist after consent obtained via telephone. Consent placed in chart. PEEP increased to 10 post bronch and repeat CXR ordered. Currently with Sp02 of 100%. Will continue to monitor and wean oxygen as able.

## 2020-10-09 NOTE — PROCEDURES
Procedure:  Bronchoscopy  Indication:  Acute RUL collapse  Providers:  Kelsea Savage MD  Medications: continued on ICU gtt medications (see MAR)  Time Out:  Performed    The patient's medical record has been reviewed.  The indication for the procedure was reviewed and is RUL collapse  The necessary history and physical examination was performed.  The risks, benefits and alternatives of the procedure were discussed with the the patient's representative (Eben- Spouse) in detail and he had the opportunity to ask questions.  All questions were answered and verbal and written informed consent was obtained.  The proposed procedure and the patient's identification were verified prior to the procedure by the physician and the nurse and respiratory therapist.      The patient was assessed for the adequacy for the procedure and to receive medications.   Mental Status:  Sedated, comatose  Airway examination: 7.5 ETT in place  Pulmonary:  Decreased breath sounds anteriorly on the right, no shifting of trachea  CV:  S1 and S2 appreciated       After clinical evaluation and reviewing the indication, risks, alternatives and benefits of the procedure the patient was deemed to be in satisfactory condition to undergo the procedure.      Immediately before administration of medications the patient was re-assessed for adequacy to receive sedatives including the heart rate, respiratory rate, mental status, oxygen saturation, blood pressure and adequacy of pulmonary ventilation. These same parameters were continuously monitored throughout the procedure.     Maneuvers / Procedure:     The bronchoscope was inserted through the mouth via ETT.A complete airway examination was performed from the distal trachea to the subsegmental level in each lobe of both lungs. There was sticky mucus mixed with slight blood in the RUL which was rinsed with 15 mL of saline with subsequent removal with suctioning. All airways appears slightly friable with  erythematous mucosa but without active bleeding or evidence of alveolar hemorrhage. No taniya bleeding in airways or ulcerations noted and no significant endobronchial lesions. The RML bronchus was very malasic with less than 50% opening. Again no evidence of extrinsic compression or endobronchial lesion.  Left side without any significant mucus plugging, patent airways.     No BAL was performed. No specimens were sent for analysis.     Recommendations:   - Increase PEEP to 10  - Repeat CXR          Total sedation time: 20 minutes of 1:1 continuous monitoring    Complications: None    Estimated Blood Loss: 0 ml    The patient tolerated the procedure well without undue discomfort, hypotension or arrhythmia, or hypoxia.    The procedure was performed bedside in the CVICU    Kelsea Savage MD  Pulmonary and Critical Care Attending  Pager: [phone]

## 2020-10-09 NOTE — PLAN OF CARE
Pupils 5mm and fixed. Team notified. Down to Nuc Med scan to time. SR with rates in the 80s. See flowsheets for VS and gtt titration. IABP 1:1 Aug 100%. CMV settings unchanged. Scant amount of secretions from ETT. See flowsheets for UOP. Patient's  Eben wanted to be contacted by [facility] sooner vs later. Eben brought up organ donation to bedside nurse. Await patient's return from Nuc Med scan.

## 2020-10-09 NOTE — PROGRESS NOTES
Critical Care Cardiology   Progress Note  Kathi Rehman MRN: [id number]  Age: 45 year old, : 1975  Date: 10/6/2020      History of Present Illness   Kathi Rehman is a 45 year old female with PMHx of PCOS presented to [facility] on 10/2/2020 following out of hospital refractory VF cardiac arrest. Patient was reportedly going to urgent care when she collapsed getting out of the car. Clinic staff performed immediate bystander CPR. Initial rhythm was VF. Brief ROSC for 1 minute but converted to VF again. Patient was shocked a total of 7 times, received 1 mg epi, and 300 mg amiodarone. She was transferred to [facility] CCL approximately 1 hour after arrest on CARMINA and in asystole. Initial labs notable for PCO2 61, PO2 69, andlactate of 19. Patient was placed on VA ECMO and underwent coronary angiogram which revealed thrombotic occlusion of the proximal LAD with minimal CAD otherwise. Pt underwent PCI w/ THELMA of pLAD. Cooling catheter was placed for TTM and pt was transferred to CVICU for post-arrest care.      Subjective/Interval Events   Episode of hypoxia and hypotension overnight. CXR w/ collapsed RUL d/t mucus plug s/p bronch w/ improvement in oxygenation. PEEP increased to 10. Ongoing hypotension requiring increasing doses of epi. Loss of brainstem reflexes at some point early this morning; pupils fixed and dilated on exam.     Assessment and Plan   Today's plan:  -NCC consult for brain death exam   -NM brain scan this afternoon   -add norepi if needed   -zosyn for GNRs in sputum   -appreciate assistance of Palliative Care team   ------------------  Neurology:   Anoxic brain injury w/ diffuse cerebral edema and sulcal/basal cistern effacement   Scattered infarcts  Intubated. Cooled to initially to 34 degrees; normothermic since 10/4 at 0900. Pupils ~4 mm, and non-reactive. Pt is unresponsive and brainstem reflexes absent this AM.   Imaging/procedures:   CTH 10/6: Sequela of anoxic event seen as bilateral deep gray  nuclei ischemic changes and also ischemic changes involving some of the adjacent white matter and parietal cortices as described above. Diffuse intracranial edema with resultant effacement of the sulci, ventricular system, basal cisterns and downward tonsillar herniation. When compared with 10/5/2020 dated study, the sulcal effacement, basal cisternal effacement and ventricular effacement have slightly decreased.  EEG 10/6: worsening encephalopathy, no seizures   Sedation/paralytics:   None  Plan:   -NCC consult for brain death exam   -NM brain scan this afternoon     Cardiovascular:   OOHCA   Thrombotic occlusion of pLAD s/p PCI w/ THELMA   Ischemic cardiomyopathy (EF 45-50%)  Refractory VF arrest s/p peripheral VA ECMO insertion w/ distal reperfusion catheter. Thrombotic occlusion of pLAD w/ minimal CAD otherwise; s/p PCI w/ THELMA of pLAD.  MCS:   VA ECMO 4.4/3400/0.5/60% - ACT goal: 160-180  IABP 1:1 - pulsatility when paused   Imaging/procedures:   Coronary angiogram 10/2: thrombotic occlusion of pLAD w/ minimal CAD otherwise; s/p PCI w/ THELMA of pLAD  TTE 10/4: VA ECMO turndown from 3.4 to 1.5L with the IABP off. Baseline: Mildly (EF 40-45%) reduced left ventricular function is present. Apical wall akinesis is present. With turndown, LV fxn improves to 45-50%. Right ventricular function, chamber size, wall motion, and thickness are normal. No pericardial effusion is present. Compared to prior, LV fxn is improved.  EKG 10/8: SR 90s, anterolateral Q waves w/ improving persistent Lottie, inferior ST depression, QTc 490   Vasoactive/antiarrhythmic infusions:   Nicardipine    Plan:   -continue epi   -add norepi if needed   -drawing ABGs from RRA  -ACT goal 160-180  -continue ASA and ticagrelor   -hold ACE/ARB given ROGERS  -hold beta blocker given shock   -hold statin given hepatic injury after arrest    Pulmonary:  Acute hypoxic respiratory failure   Aspiration pneumonia   Vent settings:   CMV 10/400/10/60% - ABG:  7.39/35/119/21  Imaging/procedures:   CXR 10/9: Improved aeration to the right upper lobe following bronchoscopy with moderate persistent consolidation of the right upper lobe suggestive of partial collapse. Small left pleural effusion.  CT chest 10/2: Small bilateral pleural effusions with consolidative opacities in the dependent lung fields with diffuse scattered nodular ground glass opacities and smooth interlobular septal thickening. Overall findings suggestive of pulmonary edema underlying infection is not entirely  excluded.  Plan:   -no longer overbreathing vent   -add zosyn for GNRs in sputum   -daily CXR  -Q2h ABGs for now  -consider scheduled duonebs if signs of lung dz, currently PRN     GI and Nutrition:   Acute liver injury, improving   Imaging/procedures:   CT abd/pelvis 10/2: Relatively featureless-appearing bowel which is a nonspecific finding but can be seen in patient's with shock bowel. No pneumatosis, portal venous gas, or pneumoperitoneum.   Plan:   -hold TF for now   -monitor BID LFTs  -continue bowel regimen   -GI prophylaxis: protonix     Renal, Fluid, and Electrolytes:   Acute kidney injury  Permissive hypernatremia   Benign right adrenal adenoma on CT   Received mannitol 10/5 after CTH and was started on 3% Na infusion to keep Na 155-160. Infusion off since 10/6 d/t Na >160. Creatinine 3.03, BUN 71. 2.1L UOP yesterday and net positive 2.3L.   Plan:   -Na 158 this AM; continue FWF to keep Na 155-160  -3% Na infusion if needed to keep Na 155-160  -avoid hypotonic solutions  -monitor urine output  -maintain K>3.8 and Mg>2    Infectious Disease:   Aspiration pneumonia   Leukocytosis   WBC 10.2; afebrile. Blood collected - NGTD. Sputum growing Klebsiella oxytoca.   Plan:   -treated initially w/ vanc/zosyn x5 days for aspiration pneumonia   -add zosyn for abn sputum culture  -daily blood cultures while on ECMO     Hematology and Oncology:   Acute blood loss anemia   Thrombocytopenia   Right RP  "hematoma   Right inguinal subcutaneous hematoma   Hgb 6.7, plt 70. INR 1.47. Receiving heparin for ECMO and ASA/ticagrelor for THELMA. Requiring intermittent PRBC transfusions.   Plan:   -transfused 2u PRBCs overnight   -heparin gtt for ECMO with ACT goal 160-180  -cryo PRN for fibrinogen < 200; FFP for INR >2  -transfuse for hgb <8  -US LE arterial per protocol w/ patent bilateral lower extremity arteries   -DVT prophylaxis: heparin gtt    Endocrine:   Hyperglycemia   No known medical history. BG elevated in setting of critical illness.  Plan:   -insulin gtt  -hgb A1c: 5.3%    Integumentary   Tongue abrasion and swelling, likely secondary to biting tongue with arrest and intubation.   -ENT consulted; orders for bite block and keep oral tongue moistened   -[facility] nurse following    Lines:   R femoral arterial ECMO cannula October 2, 2020  L femoral venous ECMO cannula October 2, 2020   L femoral arterial line (IABP) October 2, 2020  R femoral venous line (Thermogard) October 2, 2020  R radial arterial line October 2, 2020  ETT October 2, 2020  Babcock catheter October 2, 2020  OG tube October 2, 2020  Restraint: not needed    Current lines are required for patient management      Family update by me today: Yes     Code Status: Full code    The pt was discussed and evaluated with Dr. Rowland, attending physician, who agrees with the assessment and plan above.     Rosa Clay, SAMAN APRN [facility]   Critical Care Cardiology   [phone]     Objective     Pulse 59   Temp 98.2  F (36.8  C)   Resp 10   Ht 1.676 m (5' 6\")   Wt 95.8 kg (211 lb 3.2 oz)   SpO2 99%   BMI 34.09 kg/m    Temp:  [97.9  F (36.6  C)-98.8  F (37.1  C)] 98.2  F (36.8  C)  Pulse:  [] 59  Resp:  [10-16] 10  MAP:  [60 mmHg-91 mmHg] 65 mmHg  Arterial Line BP: ()/(42-69) 93/45  FiO2 (%):  [60 %] 60 %  SpO2:  [83 %-100 %] 99 %  Wt Readings from Last 2 Encounters:   10/08/20 95.8 kg (211 lb 3.2 oz)     I/O last 3 completed shifts:  In: 4772.72 " [I.V.:1632.72; NG/GT:2540]  Out: 2480 [Urine:2180; Stool:300]    GENERAL APPEARANCE: Intubated. Unresponsive.   HEENT: No icterus, pupils 4 mm and non-reactive, ETT in place, OG tube in place.  CARDIOVASCULAR: tachycardic, normal S1/S2, no S3/S4 and no murmur, click, or rub. Normal PMI. Dorsalis pedis pulses dopplerable.  RESP: Coarse bilaterally. Mechanical ventilation.    GASTRO: Abdomen soft, bowel sounds hypoactive but present.  GENITOURINARY: Babcock in place.  EXTREMITIES: Cool, +1 edema, DP pulses dopplerable. VA ECMO cannulas in right and left groin, ThermoGard in right groin, and IABP in left groin.   NEURO: Intubated. Pupils 4 mm and non-reactive. Unresponsive.   INTEGUMENTARY: No rashes. RFA and LFV cannulae sites oozing; otherwise line sites CDI.   LINES/TUBES/DRAINS: VA ECMO cannulas R and L groin, IABP L groin, and ThermoGard in R groin. R radial arterial line. ETT. OG. Babcock Catheter.     Data     Vent Settings:  Resp: 10 SpO2: 99 % O2 Device: Mechanical Ventilator      Arterial Blood Gas:   Recent Labs   Lab 10/09/20  0556 10/09/20  0343 10/09/20  0209 10/08/20  2354   PH 7.39 7.36 7.35 7.37   PCO2 35 39 39 38   PO2 119* 106* 109* 195*   HCO3 21 22 22 22   O2PER 60.0 60.0 60.0 100.0       Vitals:    10/06/20 0545 10/07/20 0400 10/08/20 0400   Weight: 96.2 kg (212 lb 1.3 oz) 95.4 kg (210 lb 5.1 oz) 95.8 kg (211 lb 3.2 oz)   I/O last 3 completed shifts:  In: 4772.72 [I.V.:1632.72; NG/GT:2540]  Out: 2480 [Urine:2180; Stool:300]  Recent Labs   Lab 10/09/20  0343 10/08/20  2159 10/08/20  2156   *  --  158*   POTASSIUM 4.6  --  5.0   CHLORIDE 131*  --  130*   CO2 24  --  23   ANIONGAP 3  --  5   GLC 92  96 125* 120*   BUN 71*  --  67*   CR 3.03*  --  2.78*   CARMELO 7.9*  --  7.5*     No components found for: URINE   Recent Labs   Lab 10/09/20  0343 10/08/20  2156 10/08/20  1554   * 281* 286*   ALT 92* 103* 100*   BILITOTAL 0.7 0.9 0.9   ALBUMIN 2.0* 2.3* 2.4*   PROTTOTAL 4.7* 5.2* 5.2*   ALKPHOS  85 92 98     Temp: 98.2  F (36.8  C) Temp src: EsophagealTemp  Min: 97.9  F (36.6  C)  Max: 98.8  F (37.1  C)   Recent Labs   Lab 10/09/20  0343 10/08/20  2156 10/08/20  1554 10/08/20  1204 10/08/20  1002 10/08/20  0335 10/08/20  0335   WBC 10.2 14.7* 16.1*  --  17.1*  --  17.3*   HGB 6.7* 8.0* 7.3* 11.2* 6.8*   < > 7.3*   HCT 21.3* 25.8* 23.3*  --  21.6*  --  22.9*   MCV 96 96 95  --  96  --  95   RDW 16.0* 15.9* 15.9*  --  15.9*  --  16.0*   PLT 70* 84* 78*  --  81*  --  80*    < > = values in this interval not displayed.     Recent Labs   Lab 10/09/20  0343 10/08/20  2156 10/08/20  1554 10/08/20  1002 10/08/20  0335   INR 1.52* 1.48* 1.49* 1.49* 1.47*   PTT 67* 65* 58* 55* 58*     Recent Labs   Lab 10/09/20  0343 10/08/20  2159 10/08/20  2156 10/08/20  1554 10/08/20  1204   GLC 92  96 125* 120* 109*  117* 123*       All imaging personally reviewed:  Recent Results (from the past 24 hour(s))   CT Head w/o Contrast   Result Value    Radiologist flags Diffuse cerebral edema with multifocal infarcts (AA)    Narrative    CT HEAD W/O CONTRAST 10/5/2020 2:36 PM    Provided History: s/p Cardiac arrest, on ECMO, pupils fixed/dilated    Comparison: CT head 10/2/2020.    Technique: Using multidetector thin collimation helical acquisition  technique, axial, coronal and sagittal CT images from the skull base  to the vertex were obtained without intravenous contrast.     Findings:    There is diffuse loss of the gray-white differentiation. Significant  hypodensities are present in the bilateral basal ganglia, most  pronounced involving the bilateral corpus striatum. There is  additional involvement of the bilateral thalami, the bilateral  paramedian posterior parietal lobes and the posteroinferior temporal  lobes.    There is no hydrocephalus. Moderate effacement of the fourth  ventricle. Basal cisterns are effaced.    There is no depressed calvarial fracture. Air-fluid levels noted in  paranasal sinuses and mastoid air  cells.       Impression    Impression:   Findings of diffuse cerebral edema with sulcal and basal cistern  effacement. Infarcts involving the bilateral corpus stratum and to  lesser degree thalami. Additional infarcts of the bilateral paramedian  right greater than left parietal lobes.    [Critical Result: Diffuse cerebral edema with multifocal infarcts]    Finding was identified on 10/5/2020 2:45 PM.     Dr. Conway was contacted by Dr. Vasquez at 10/5/2020 2:55 PM and  verbalized understanding of the critical finding.     I have personally reviewed the examination and initial interpretation  and I agree with the findings.    DIMITRI MARCUS MD          Medications     Current Facility-Administered Medications   Medication     albumin human 5 % injection 50 g     albumin human 5 % injection 500 mL     artificial tears ophthalmic ointment     aspirin (ASA) chewable tablet 81 mg     calcium chloride in  mL intermittent infusion 1 g     dextrose 10% infusion     dextrose 10% infusion     glucose gel 15-30 g    Or     dextrose 50 % injection 25-50 mL    Or     glucagon injection 1 mg     EPINEPHrine (ADRENALIN) 5 mg in sodium chloride 0.9 % 250 mL infusion     heparin 2 unit/mL in 0.9% NaCl (for REPERFUSION CATHETER)     heparin 25,000 units in 0.45% NaCl 250 mL ANTICOAGULANT  infusion     hydrALAZINE (APRESOLINE) tablet 25 mg     influenza quadrivalent (PF) vacc (FLUZONE) injection 0.5 mL     insulin 1 unit/mL in saline (NovoLIN, HumuLIN Regular) drip - ADULT IV Infusion     lactated ringers BOLUS 1,000 mL     lidocaine (LMX4) cream     lidocaine 1 % 0.1-1 mL     magnesium sulfate 2 g in water intermittent infusion     magnesium sulfate 4 g in 100 mL sterile water (premade)     Medication Considerations - To maintain platelet inhibition after discontinuation of cangrelor (KENGREAL) [see admin instructions]     midazolam (VERSED) injection 1-3 mg     multivitamins w/minerals (CERTAVITE) liquid 15 mL      naloxone (NARCAN) injection 0.1-0.4 mg     norethindrone (MICRONOR) 0.35 MG per tablet 0.35 mg (CRUSH in CHEMO ROOM)     pantoprazole (PROTONIX) IV push injection 40 mg     polyethylene glycol (MIRALAX) Packet 17 g     potassium chloride (KLOR-CON) Packet 20-40 mEq     potassium chloride 10 mEq in 100 mL intermittent infusion with 10 mg lidocaine     potassium chloride 10 mEq in 100 mL sterile water intermittent infusion (premix)     potassium chloride 20 mEq in 50 mL intermittent infusion     potassium chloride ER (KLOR-CON M) CR tablet 20-40 mEq     senna-docusate (SENOKOT-S/PERICOLACE) 8.6-50 MG per tablet 1 tablet     sodium chloride (PF) 0.9% PF flush 3 mL     sodium chloride (PF) 0.9% PF flush 3 mL     sodium phosphate 10 mmol in D5W intermittent infusion     sodium phosphate 15 mmol in D5W intermittent infusion     sodium phosphate 20 mmol in D5W intermittent infusion     sodium phosphate 25 mmol in D5W intermittent infusion     ticagrelor (BRILINTA) tablet 90 mg

## 2020-10-09 NOTE — PROGRESS NOTES
ECMO Shift Summary:      ECMO Equipment:  Console serial number: [id number]  Circuit Lot number: [id number]  Oxygenator Lot number: [id number]      Patient remains on V/A ECMO, all equipment is functioning and alarms are appropriately set. RPM's 3800 with flow range 4.12-4.47 L/min. Sweep gas is at 1.5 LPM and FiO2 60%. Circuit remains free of air, clot and fibrin. Cannulas are secure with oozing from the Rt leg sites. Extremities are warm.     Significant Shift Events: Pt brought to [facility] for head scan and TOD was pronounced at 1453. [employer] [facility] is now involved in the pt care decisions.    Vent settings:  Ventilation Mode: CMV/AC  (Continuous Mandatory Ventilation/ Assist Control)  FiO2 (%): 60 %  Rate Set (breaths/minute): 10 breaths/min  Tidal Volume Set (mL): 400 mL  PEEP (cm H2O): 10 cmH2O  Oxygen Concentration (%): 60 %  Resp: 10  .    Heparin is running at 1250 u/hr, ACT range 160-180, actual ACT's 133-086-yrorqun is clean and Xa's .52-.6    Blood loss was oozing from Rt leg. Product given included x1 prbc.      Intake/Output Summary (Last 24 hours) at 10/9/2020 1842  Last data filed at 10/9/2020 1800  Gross per 24 hour   Intake 4396.41 ml   Output 1080 ml   Net 3316.41 ml       ECHO:  No results found for this or any previous visit.No results found for this or any previous visit.    CXR:  Recent Results (from the past 24 hour(s))   XR Chest Port 1 View   Result Value    Radiologist flags Probable right upper lobar Bronchial plugging (Urgent)    Narrative    XR CHEST PORT 1 VW  10/8/2020 10:23 PM      HISTORY: Evaluate ET tube placement    COMPARISON: Chest x-ray 10/8/2020, 10/7/2020    TECHNIQUE: Supine frontal view of the chest    FINDINGS: Complete collapse of the right upper lobe with a few air  bronchograms. Remainder of the lungs appear clear. No pneumothorax or  significant pleural effusion. Heart size is normal. Trachea is  midline. Upper abdomen is unremarkable. No suspicious osseous  lesion.    Endotracheal tube tip projects over the midthoracic trachea. ECMO  cannula from inferior approach with tip projecting over the mid SVC.  Intra-aortic balloon pump radio opaque marker is just below the aortic  knob. Enteric tube projecting over the stomach.      Impression    IMPRESSION:   1. New complete collapse of the right upper lobe compared to same day  x-ray at 0150 hours. Endobronchial plugging is suspected. Consider  bronchoscopy.  2. Endotracheal tube tip projects over the midthoracic trachea. Other  support devices are stable.    [Urgent Result: Probable right upper lobar Bronchial plugging]    Finding was identified on 10/8/2020 10:27 PM.     Dr. Soto was contacted by Dr. Brown at 10/8/2020 10:31 PM and  verbalized understanding of the urgent finding.     I have personally reviewed the examination and initial interpretation  and I agree with the findings.    MIKE CHURCH MD   XR Chest Port 1 View    Narrative    EXAM: XR CHEST PORT 1 VW  10/9/2020 2:51 AM     HISTORY:  Post bronchoscopy.       COMPARISON:  Chest x-ray 10/8/2020.    FINDINGS: AP radiograph of the chest. Endotracheal tube projecting 3.2  cm cephalad to the thu. Inferior approach ECMO cannula with the tip  overlying the mid SVC. Intra-aortic ballon pump marker at the level of  the thu. Enteric tube with the tip inside port overlying the  stomach with the tip directed towards the fundus.    Improved aeration of the right upper lung with moderate persistent  consolidative opacification of the right upper lobe consistent with  partial collapse. Small left pleural effusion. Stable cardiac  silhouette. The visualized upper abdomen is unremarkable. No acute  osseous abnormality.      Impression    IMPRESSION:  1. Improved aeration to the right upper lobe following bronchoscopy  with moderate persistent consolidation of the right upper lobe  suggestive of partial collapse.  2. Small left pleural effusion.  3. Stable  position of additional supportive devices.    I have personally reviewed the examination and initial interpretation  and I agree with the findings.    TAMMY SANCHEZ MD   NM Brain Scan Complete w Vascular Flow   Result Value    Radiologist flags No evidence of brain perfusion. (Urgent)    Narrative    BRAIN PERFUSION: NM BRAIN SCAN COMPLETE W VASCULAR FLOW    DATE: 10/9/2020 2:53 PM    HISTORY: brain death assessment    COMPARISON: CT head 10/6/2020    TECHNIQUE:   When possible a head band is placed to reduced the external carotid  circulation over the scalp. Dynamin images were obtained after the IV  administration of 20 mCi of Tc99m Ceretec.  Static images were  obtained after the dynamic imaging series to look for intercranial  venous circulation.    FINDINGS:    The flow series demonstrates no perfusion to the brain.    The delayed static images demonstrates no perfusion to the brain.      Impression    IMPRESSION: No evidence of brain perfusion.    [Urgent Result: No evidence of brain perfusion.]    Finding was identified on 10/9/2020 3:19 PM.     [facility] was contacted by Dr. Duarte at 10/9/2020 3:25 PM and  verbalized understanding of the urgent finding.         Labs:  Recent Labs   Lab 10/09/20  1802 10/09/20  1554 10/09/20  1211 10/09/20  1008   PH 7.36 7.33* 7.32* 7.31*   PCO2 37 39 39 39   PO2 127* 112* 109* 94   HCO3 21 20* 20* 20*   O2PER 60 60 60 60       Lab Results   Component Value Date    HGB 7.4 (L) 10/09/2020    PHGB <30 10/09/2020    PLT 69 (L) 10/09/2020    FIBR 535 (H) 10/09/2020    INR 1.45 (H) 10/09/2020    PTT 55 (H) 10/09/2020    DD 10.4 (H) 10/09/2020    AXA 0.52 10/09/2020    ANTCH 69 (L) 10/09/2020         Plan is support pt until organ harvesting.      Mele Pierce, RT  ECMO Specialist  10/9/2020 6:42 PM

## 2020-10-09 NOTE — PROGRESS NOTES
CLINICAL NUTRITION SERVICES    Pt pronounced brain dead. Nutrition interventions discontinued and no further interventions planned at this time.     RD signing off on 10/9/2020.

## 2020-10-09 NOTE — PROGRESS NOTES
Kathi Rehman MRN# [id number]   Age: 45 year old YOB: 1975     Adult Brain Death Checklist:    Prerequisites: Neuroimaging explains coma, No evidence of residual paralytics, Normothermia or mild hypothermia, No spontaneous respirations, Coma, irreversible and cause known, CNS depressant drug effect absent indicated toxicology screen, Absence of severe acid-base, electrolyte, endocrine abnormality, Systolic blood pressure >= 100 mm Hg, CNS depressant drug effect absent, Clinical history and imaging supports brain death and Normothermia or mild hypothermia (>36 degrees C)    Clinical Examination #1: Pupils nonreactive to bright light, Corneal reflex absent, Oculocephalic reflex absent (tested only if C-spine integrity ensured), No facial movement to noxious stimuli at supraorbital nerve, temporomandibular joint, Gag reflex absent, Absence of motor response to noxious stimuli in all 4 limbs (spinally mediated reflexes are permissible), Cough reflex absent to tracheal suctioning, Bilateral cold caloric testing done with no eye deviation and No facial movement to noxious stimuli in the face          Apnea test aborted (The patient was too unstable to perform apnea test.)           Documentation is in the chart regarding discussions with family and/or legal decision maker [employer] has been contacted    Date/Time of Death: 10/9/2020 at 14:53      ABG Results:    Recent Labs   Lab 10/09/20  1211 10/09/20  1008 10/09/20  0759 10/09/20  0556   PH 7.32* 7.31* 7.28* 7.39   PCO2 39 39 43 35   PO2 109* 94 132* 119*   HCO3 20* 20* 20* 21   O2PER 60 60 60.0 60.0      Examination #1 performed at 1200 10/9/2020  Examination and determination of death performed by staff neurointensivist, Dr Trino Kohli, DO  [id number]  Neurocritical Care Fellow

## 2020-10-09 NOTE — DISCHARGE SUMMARY
[facility]     Discharge Summary  Critical Care Cardiology     Date of Admission:  10/2/2020  Date of Discharge:  10/9/2020  Discharging Provider: Rosa Clay  Date of Service (when I saw the patient): 10/9/2020    Discharge Diagnoses   Brain death   Anoxic brain injury   Out-of-hospital cardiac arrest   Thrombotic occlusion of pLAD s/p PCI   Ischemic cardiomyopathy   Acute hypoxic respiratory failure   Aspiration pneumonia   Acute liver injury   Acute kidney injury   Acute blood loss anemia     History of Present Illness   Kathi Rehman is a 45 year old female with PMHx of PCOS presented to [facility] on 10/2/2020 following out of hospital refractory VF cardiac arrest. Patient was reportedly going to urgent care when she collapsed getting out of the car. Clinic staff performed immediate bystander CPR. Initial rhythm was VF. Brief ROSC for 1 minute but converted to VF again. Patient was shocked a total of 7 times, received 1 mg epi, and 300 mg amiodarone. She was transferred to [facility] CCL approximately 1 hour after arrest on CARMINA and in asystole. Initial labs notable for PCO2 61, PO2 69, andlactate of 19. Patient was placed on VA ECMO and underwent coronary angiogram which revealed thrombotic occlusion of the proximal LAD with minimal CAD otherwise. Pt underwent PCI w/ THELMA of pLAD. Cooling catheter was placed for TTM and pt was transferred to CVICU for post-arrest care.      Hospital Course   Kathi Rehman was admitted on 10/2/2020 after out-of-hospital cardiac arrest. Urgent coronary angiogram revealed thrombotic occlusion of the proximal LAD and pt underwent PCI w/ THELMA of pLAD. A cooling catheter was placed for TTM and pt was transferred to CVICU for post-arrest care. Initial CT head showed asymmetric loss of gray-white differentiation of right frontal lobe, basal ganglia, and occipital lobe indicative of hypoxic ischemic injury. She was cooled for 24 hours  post-arrest with ongoing hemodynamic support with VA ECMO and IABP. Echo with ECMO turndown on 10/4 showed EF 45-50% with apical akinesis; plan was then for ECMO decannulation on 10/6. However, in the afternoon on 10/5, pupils were noted to be fixed. Pt was sent for repeat head CT which showed diffuse cerebral edema with multifocal infarcts. Pt was seen by Neurocritical Care team and treated with mannitol and 3% Na infusion to keep sodium 155-160. Plan was made to repeat head CT in 24 hours. VA ECMO decannulation was cancelled given changes in neurological exam. She remained hypertensive during this time which was treated with nicardipine infusion. Repeat head CT on 10/6 showed diffuse intracranial edema with resultant effacement of the sulci, ventricular system, basal cisterns and downward tonsillar herniation which were slightly decreased. Results were discussed with pt's  and plan was made to continue cares for 2-3 days to allow more time for potential neurological recovery. Brainstem reflexes were intact at this time and pupils intermittently reactive. On the evening of 10/8, pt had an acute episode of hypoxia and hypotension. Chest xray revealed collapsed RUL due to mucus plug. Pt underwent bronchoscopy and PEEP increased to 10 with resultant improvement in oxygenation. However, she remained hypotensive which required epinephrine gtt. Pressor requirements continued to increase throughout the morning. During the morning on 10/9, brainstem reflexes were noted to be absent and pupils were fixed. Pt's  was notified of these changes and he, along with pt's parents, came to pt's bedside. Neurocritical Care performed brain death examination which was consistent with brain death. A nuclear medicine brain scan was obtained on the afternoon of 10/9 which showed no evidence of brain perfusion indicative of brain death. Time of death: 2:53 pm on 10/9. Pt's  was notified of this result. Pt was continued  to be supported with ventilator, VA ECMO, and IABP as organ donation candidacy was determined.     The pt was discussed and evaluated with Dr. Rowland, attending physician, who agrees with the assessment and plan above.     Rosa Clay DNP APRN CNP  Critical Care Cardiology  [phone]    Significant Results and Procedures   Coronary angiogram  Echocardiogram   CT CAP   CT head   NM brain scan       Pending Results   N/A  Unresulted Labs Ordered in the Past 30 Days of this Admission     Date and Time Order Name Status Description    10/5/2020 2200 Blood culture Preliminary     10/5/2020 2200 Sputum Culture Aerobic Bacterial Preliminary     10/5/2020 0350 Blood culture Preliminary     10/3/2020 1349 S 100B Protein In process     10/3/2020 0442 Plasma prepare order unit conditional In process     10/2/2020 1555 Drug Screen Comp Med Report WB In process           Code Status   Full Code       Primary Care Physician   No primary care provider on file.        Discharge Disposition   Patient  during this admission  Condition at discharge:     Consultations This Hospital Stay   PALLIATIVE CARE ADULT IP CONSULT  NEUROLOGY CRITICAL CARE ADULT IP CONSULT  WOUND OSTOMY CONTINENCE NURSE  IP CONSULT  PHARMACY TO DOSE VANCO  PHARMACY IP CONSULT  HEMATOLOGY IP CONSULT  MEDICATION HISTORY IP PHARMACY CONSULT  WOUND OSTOMY CONTINENCE NURSE  IP CONSULT  NUTRITION SERVICES ADULT IP CONSULT  CARDIOTHORACIC SURGERY ADULT IP CONSULT  ENT IP CONSULT  PHARMACY IP CONSULT  CARDIOTHORACIC SURGERY ADULT IP CONSULT  CARE MANAGEMENT / SOCIAL WORK IP CONSULT    Time Spent on this Encounter   I, ROC Gonzalez CNP, personally saw the patient today and spent greater than 30 minutes discharging this patient.    Discharge Orders   No discharge procedures on file.  Discharge Medications   There are no discharge medications for this patient.    Allergies   No Known Allergies  Data   Most Recent 3 CBC's:  Recent Labs   Lab  Test 10/09/20  1008 10/09/20  0343 10/08/20  2156   WBC 15.3* 10.2 14.7*   HGB 7.4* 6.7* 8.0*   MCV 96 96 96   PLT 81* 70* 84*      Most Recent 3 BMP's:  Recent Labs   Lab Test 10/09/20  1008 10/09/20  0343 10/08/20  2159 10/08/20  2156   * 158*  --  158*   POTASSIUM 4.2 4.6  --  5.0   CHLORIDE 127* 131*  --  130*   CO2 22 24  --  23   BUN 81* 71*  --  67*   CR 3.46* 3.03*  --  2.78*   ANIONGAP 7 3  --  5   CARMELO 7.5* 7.9*  --  7.5*   *  191* 92  96 125* 120*     Most Recent 2 LFT's:  Recent Labs   Lab Test 10/09/20  1008 10/09/20  0343   * 258*   * 92*   ALKPHOS 91 85   BILITOTAL 0.9 0.7     Most Recent INR's and Anticoagulation Dosing History:  Anticoagulation Dose History     Recent Dosing and Labs Latest Ref Rng & Units 10/7/2020 10/8/2020 10/8/2020 10/8/2020 10/8/2020 10/9/2020 10/9/2020    INR 0.86 - 1.14 1.46(H) 1.47(H) 1.49(H) 1.49(H) 1.48(H) 1.52(H) 1.47(H)        Most Recent 3 Troponin's:  Recent Labs   Lab Test 10/09/20  1008 10/09/20  0343 10/08/20  2156   TROPI 19.857* 17.882* 19.740*     Most Recent Cholesterol Panel:  Recent Labs   Lab Test 10/04/20  0346   CHOL 68   LDL 24   HDL 26*   TRIG 89     Most Recent 6 Bacteria Isolates From Any Culture (See EPIC Reports for Culture Details):  Recent Labs   Lab Test 10/07/20  0326 10/06/20  0417 10/05/20  0824 10/05/20  0428 10/03/20  0859 10/02/20  1558   CULT Single colony  Klebsiella oxytoca  Susceptibility testing in progress  * No growth after 3 days No growth No growth after 4 days No growth Light growth  Normal mara    No growth     Most Recent TSH, T4 and A1c Labs:  Recent Labs   Lab Test 10/02/20  1556   A1C 5.3     Results for orders placed or performed during the hospital encounter of 10/02/20   CT Head w/o Contrast     Value    Radiologist flags Possibly hypoxic ischemic injury (Urgent)    Narrative    CT HEAD W/O CONTRAST 10/2/2020 3:15 PM    History: s/p ECMO   ICD-10: Status post ECMO    Comparison:  None    Technique: Using multidetector thin collimation helical acquisition  technique, axial, coronal and sagittal CT images from the skull base  to the vertex were obtained without intravenous contrast.    Findings: There is no intracranial hemorrhage, mass effect, or midline  shift. Asymmetric loss of gray-white differentiation of the right  frontal lobe, basal ganglia, occipital lobe, could be secondary to  hypoxic ischemic injury.  Gray/white matter differentiation in the  left cerebral hemispheres is mostly preserved. Ventricles are  proportionate to the cerebral sulci. The basal cisterns are clear.    The bony calvaria and the bones of the skull base are normal. The  visualized portions of the paranasal sinuses and mastoid air cells are  clear.       Impression    Impression:  Asymmetric loss of gray-white differentiation of the right frontal  lobe, basal ganglia, and occipital lobe which may represent hypoxic  ischemic injury. Recommend obtaining MRI to further characterize, if  possible.     [Urgent Result: Possibly hypoxic ischemic injury ]    Finding was identified on 10/2/2020 3:19 PM.      was contacted by Dr. Marta Palafox at 10/2/2020 4:08 PM and  verbalized understanding of the urgent finding.     I have personally reviewed the examination and initial interpretation  and I agree with the findings.    MICHELLE MI MD   CT Chest Abdomen Pelvis w/o Contrast     Value    Radiologist flags High ETT and Shock Bowel (Urgent)    Narrative    EXAMINATION: CT CHEST ABDOMEN PELVIS W/O CONTRAST, 10/2/2020 3:22 PM    TECHNIQUE:  Helical CT images from the thoracic inlet through the  symphysis pubis were obtained without IV contrast.     COMPARISON: None available.    HISTORY: s/p ECMO    FINDINGS:  Lines/tubes: Endotracheal tube within the high thoracic trachea.  Enteric tube within the stomach. Left femoral approach venous ECMO  cannula with the tip in the mid SVC. Left femoral intra-aortic balloon  pump  with the superior metallic marker approximately 1.3 cm cephalad  to the thu. Right femoral artery ECMO cannula within the right  common iliac vein. Femoral venous PICC with the tip at the inferior  cavoatrial junction. Right inguinal subcutis hematoma measuring 0.9 x  5.6 cm. Urinary catheter with the balloon inflated within the urinary  bladder.    CHEST:  LUNGS: The trachea and central airways are patent. Small bilateral  pleural effusions with consolidative opacities in the dependent lung  fields. Diffuse nodular groundglass opacities with smooth interlobular  septal thickening.     MEDIASTINUM: The visualized thyroid gland is unremarkable. The heart  size is within normal limits. Small pericardial effusion. Metallic  stent within the left anterior distending coronary artery. Descending  aorta and main pulmonary artery diameters are within normal limits.  Normal appearance and configuration of the great vessels off the  aortic arch. No suspicious mediastinal, hilar, or axillary lymph  nodes.    ABDOMEN/PELVIS:  LIVER: No focal hepatic mass.    BILIARY: The gallbladder is distended. No gallbladder wall thickening  or hyperdense stone. No intrahepatic or extrahepatic biliary ductal  dilatation.    PANCREAS: Within normal limits.    SPLEEN: Within normal limits.    ADRENAL GLANDS: 0.9 cm hypodense nodule within the right adrenal gland  consistent with a benign right adrenal adenoma. The left adrenal gland  is unremarkable.    URINARY TRACT: Hyperdense contrast within the collecting systems and  urinary bladder consistent with earlier same day coronary angiography.    REPRODUCTIVE ORGANS: Within normal limits.    STOMACH: Enteric tube within the gastric lumen with a moderate amount  of intragastric air and fluid.    BOWEL: Multiple fluid-containing loops of large and small bowel. The  small bowel appears relatively featureless which is a nonspecific  finding but can be seen in patient's with shock bowel. No  pneumatosis,  portal venous gas, or pneumoperitoneum. The appendix is visualized and  is unremarkable.    PERITONEUM/FLUID: No free fluid. Small to moderate size right  retroperitoneal hematoma near the renal collecting system and just  inferior to the right kidney.    VESSELS: No aneurysmal dilatation of the abdominal aorta.    LYMPH NODES: No lymphadenopathy.    BONES/SOFT TISSUES: No aggressive osseous lesions.      Impression    IMPRESSION:   1. Endotracheal tube projecting over the high thoracic trachea,  consider advancement approximately 2 cm.  2. Relatively featureless-appearing bowel which is a nonspecific  finding but can be seen in patient's with shock bowel. No pneumatosis,  portal venous gas, or pneumoperitoneum.  3. Small bilateral pleural effusions with consolidative opacities in  the dependent lung fields with diffuse scattered nodular groundglass  opacities and smooth interlobular septal thickening. Overall findings  suggestive of pulmonary edema underlying infection is not entirely  excluded.  4. Small right inguinal subcutaneous hematoma. Small to moderate sized  right retroperitoneal hematoma.  5. Benign right adrenal adenoma.  6. Additional support devices as detailed above.    [Urgent Result: High ETT and Shock Bowel]    Finding was identified on 10/2/2020 3:39 PM.     Dr. Conway  was contacted by Dr. Cook at 10/2/2020 4:00 PM and  verbalized understanding of the urgent finding.     I have personally reviewed the examination and initial interpretation  and I agree with the findings.    JEZ ENG MD   XR Chest Port 1 View    Narrative    EXAM: XR CHEST PORT 1 VW  10/2/2020 5:16 PM     HISTORY:  Check endotracheal tube placement and ECLS cannula  placement. DO NOT log-roll patient.  Place film under patient using  patient safety handling process.       COMPARISON:  Chest and pelvis CT 10/2/2020.    FINDINGS: AP radiograph of the chest. Endotracheal tube projecting  over the high  thoracic trachea 6.4 cm cephalad to the thu.  Recommend advancement approximately 2 to 3 cm. Intra-aortic balloon  pump metallic marker projecting 2 cm cephalad to the thu. Inferior  approach cannula with the tip projected over the mid SVC. Inferior  approach PICC with the tip overlying the inferior cavoatrial junction.  Partial visualization of enteric tube coursing inferior to the  diaphragm with the tip outside the field-of-view. The  cardiomediastinal silhouettes are within normal limits. Diffuse mixed  interstitial and airspace opacities in the right greater than left  lungs. No pneumothorax or large pleural effusion.      Impression    IMPRESSION:  1. Endotracheal tube projecting over the high thoracic trachea.  Consider advancement 2-3 cm.  2. Inferior approach a small cannula with the tip overlying the mid  SVC.  3. Intra-aortic balloon pump marker 2 cm cephalad to the thu.  4. Diffuse mixed interstitial and airspace opacities likely  representing pulmonary edema. Infection is not entirely excluded.    I have personally reviewed the examination and initial interpretation  and I agree with the findings.    DEMETRIUS HESTER MD   XR Chest Port 1 View    Narrative    XR CHEST PORT 1 VW  10/3/2020 5:11 AM      HISTORY: Check endotracheal tube placement and ECLS cannula placement.  DO NOT log-roll patient.  Place film under patient using patient  safety handling process.  Cardiac arrest.    COMPARISON: Chest x-ray 10/2/2020    TECHNIQUE: Supine frontal view of the chest    FINDINGS: Bilateral mixed interstitial and patchy airspace opacities  with worsening consolidation of the left mid to lower lung zone. No  pneumothorax or significant pleural effusion. Cardiac mediastinal  silhouette is within normal limits. Trachea is midline. Upper abdomen  is unremarkable. No suspicious osseous lesion.    Endotracheal tube tip projects over the midthoracic trachea.  Intra-aortic balloon pump measures 2.3 cm superior to  thu. Inferior  approach ECMO cannula with tip projecting over the mid SVC. Inferior  approach CVC with tip projecting over the inferior cavoatrial  junction. NG/OG tube projecting over the stomach.      Impression    IMPRESSION:   1. Bilateral mixed interstitial and patchy airspace opacities with  worsening consolidation of the left mid to lower lung zone. ARDS  versus pulmonary edema or infection.  2. Stable support devices.    I have personally reviewed the examination and initial interpretation  and I agree with the findings.    DEMETRIUS HESTER MD   US Lower Extremity Arterial Duplex Bilateral    Narrative    Exam: Duplex ultrasound of lower extremity arteries dated 10/3/2020  2:13 PM     Clinical information: Baseline to assess blood flow to Lower  Extremities (VA ECMO)     Comparison: None    Technique: Grayscale (B-mode), color Doppler, and duplex spectral  Doppler ultrasound of the lower extremity arteries. Velocity  measurements obtained with angle correction of 60 degrees or less.    Ordering provider: Mykel Conway    Findings:     Right lower extremity:     Common femoral artery: Occluded by ECMO cannula.  Profunda femoral artery: Occluded by ECMO cannula.  Proximal SFA: Occluded by ECMO cannula.  Mid SFA:Velocity:  65 cm/sec. Waveforms: Biphasic  Distal SFA: Velocity: 59 cm/sec. Waveforms: Biphasic    Popliteal artery, proximal: Velocity: 41 cm/sec. Waveforms: Triphasic    PTA ankle: Velocity: 36 cm/sec. Waveforms: Biphasic  AUSTIN ankle: Velocity: 48 cm/sec. Waveforms: Triphasic    Waveforms altered by intra-aortic balloon pump.    Left lower extremity:    Common femoral artery: Occluded by ECMO cannula.  Deep femoral artery: Occluded by ECMO cannula.  Proximal SFA: Occluded by ECMO cannula.  Mid SFA: Velocity: 79 cm/sec. Waveforms: Biphasic  Distal SFA: Velocity: 62 cm/sec. Waveforms: Biphasic    Popliteal artery, proximal: Velocity: 48 cm/sec. Waveforms: Biphasic    PTA ankle: Velocity: 48 cm/sec.  Waveforms: Biphasic  AUSTIN ankle: Velocity: 49 cm/sec. Waveforms: Biphasic    Waveforms altered by intra-aortic balloon pump      Impression    Impression:     1. Right leg: Patent right lower extremity arteries with multiphasic  waveforms. Common femoral and proximal superficial femoral artery are  obscured by bandaging.    2. Left leg: Patent left lower extremity arteries and multiphasic  waveforms.  Common femoral and proximal superficial femoral artery are  obscured by bandaging.    3. Bilateral common femoral, profunda femoral, and origin of the  superficial femoral arteries are occluded by ECMO cannula.    Guidelines:    [facility] duplex criteria for lower limb arterial  occlusive disease    Percent stenosis:     Normal (1-19%): Peak systolic velocity (cm/s): <150, End-diastolic  velocity (cm/s): <40, Velocity ratio (Vr): <1.5, Distal arterial  waveform: Triphasic    20-49%: Peak systolic velocity (cm/s): 150-200, End-diastolic velocity  (cm/s): <40, Velocity ratio (Vr): 1.5-2.0, Distal arterial waveform:  Triphasic    50-75%: Peak systolic velocity (cm/s): 200-300, End-diastolic velocity  (cm/s): <90, Velocity ratio (Vr): 2.0-3.9, Distal arterial waveform:  Poststenotic turbulence distal to stenosis, monophasic distal waveform    >75%: Peak systolic velocity (cm/s): >300, End-diastolic velocity  (cm/s): <90, Velocity ratio (Vr): >4.0, Distal arterial waveform:  Dampened distal waveform and low PSV/EDV* in the stenosis    Occlusion: Absent flow by color Doppler/pulsed Doppler spectral  analysis; length of occlusion estimated from distance between exit and  reentry collateral arteries    *PSV = peak systolic velocity, EDV = end-diastolic velocity  http://[email or web address]  html    I have personally reviewed the examination and initial interpretation  and I agree with the findings.    BROOKLYN DEWITT MD   US Carotid Bilateral    Narrative    Exam:  Bilateral carotid duplex Doppler ultrasound dated 10/3/2020 2:12  PM    Clinical history: Cardiac Arrest on ECMO    Comparison Study: None    Ordering clinician: Mykel Conway    Technique: Grayscale (B-mode) and duplex and spectral Doppler  ultrasound of the extracranial internal carotid, external carotid,  vertebral artery origins, right brachiocephalic/subclavian and left  subclavian arteries. Velocity measurements obtained with angle  correction at or less than 60 degrees.    Findings:    Right side:     Mild echogenic plaque in the mid right internal carotid artery.  Remainder of the internal carotid artery and common carotid arteries  are patent. Abnormal waveforms related to ECMO cannulation.     Proximal CCA: 126/18 cm/s  Mid CCA: 118/60 cm/s  Distal CCA: 126/65 cm/sec  Proximal ECA: 115/35 cm/second    Proximal ICA: 150/72 cm/second  Mid internal ICA: 156/67 cm/s  Distal ICA: 126/65 cm/sec    ICA/CCA ratio: 1.24    Vertebral artery: 106/56 cm/sec, antegrade    Left side:     No significant plaque in the left carotid arterial system. Normal  waveforms related to ECMO cannulation.     Proximal CCA: 107/14 cm/s  Mid CCA: 117/70 cm/s  Distal CCA: 93/51 cm/sec  Proximal ECA: 88/26 cm/second    Proximal ICA: 108/62 cm/second  Mid internal ICA:  132/66 cm/s  Distal ICA:  139/46 cm/sec    ICA/CCA ratio: 1.50    Vertebral artery: 90/13 cm/sec, antegrade      Impression    Impression:    1. Right side:        Degree of stenosis of the internal carotid artery: Less than 50%.  .    2. Left side:         Degree of stenosis of the internal carotid artery: Normal. .    Consensus Panel Gray-Scale and Doppler US Criteria for Diagnosis of  ICA Stenosis (Radiology 11/2003) additionally modified by Jennifer et  al. in Journal of Vascular Surgery 1/2011, [id number])92-87.       Normal         ICA PSV < 140 cm/sec       Plaque Estimate None       ICA/CCA  PSV Ratio < 2.0       ICA EDV < 40 cm/sec       < 50%          ICA PSV < 140  cm/sec       Plaque Estimate < 50%       ICA/CCA  PSV Ratio < 2.0       ICA EDV < 40 cm/sec       50- 69%       ICA -230 cm/sec       Plaque Estimate > or = 50%       ICA/CCA PSV Ratio 2.0-4.0       ICA EDV  cm/sec         > or = 70%, less than near occlusion       ICA PSV > 230 cm/sec       Plaque Estimate > or = 50%       ICA/CCA Ratio > 4.0       ICA EDV > 100 cm/sec                                            Additional criteria from vascular surgery     > 80%       EDV > 120 cm/sec     I have personally reviewed the examination and initial interpretation  and I agree with the findings.    BROOKLYN DEWITT MD   XR Chest Port 1 View    Narrative    XR CHEST PORT 1 VW  10/4/2020 5:23 AM      HISTORY: Check endotracheal tube placement and ECLS cannula placement.  DO NOT log-roll patient.  Place film under patient using patient  safety handling process.    COMPARISON: Chest x-ray 10/3/2020    TECHNIQUE: Supine frontal view of the chest    FINDINGS: Significant bilateral airspace opacities with mild  improvement. No pneumothorax or significant pleural effusion. Cardiac  mediastinal silhouette is within normal limits. Trachea is midline.     Endotracheal tube tip projects over the midthoracic trachea. Inferior  approach ECMO cannula with tip projecting over the mid SVC. Inferior  approach CVC with tip projecting over the right atrium. Intra-aortic  balloon pump marker measures 1.3 cm superior to the thu. NG/OG tube  projects over the stomach.      Impression    IMPRESSION:   1. Mildly improved bilateral patchy airspace opacities, ARDS versus  pulmonary edema versus infection.  2. Stable support devices.    I have personally reviewed the examination and initial interpretation  and I agree with the findings.    DEMETRIUS HESTER MD   XR Chest Port 1 View    Narrative    EXAM: XR CHEST PORT 1 VW  10/5/2020 2:04 AM     HISTORY:  Check endotracheal tube placement and ECLS cannula  placement. DO NOT log-roll  "patient.  Place film under patient using  patient safety handling process.       COMPARISON:  Chest x-ray 10/4/2020.    FINDINGS: AP radiograph of the chest. Endotracheal tube projecting 4.7  cm cephalad to the thu. Intra-aortic balloon pump marker at the  level of the thu. Inferior approach\" cannula with the tip in the  mid SVC. Stable positioning of left basilar chest tube. Partial  visualization of enteric tube with the tip and side port outside of  the field of view. No significant change in patchy mixed interstitial  and airspace opacities including retrocardiac consolidative opacities.  No pneumothorax or significant pleural effusion. The visualized upper  abdomen is unremarkable. No acute osseous abnormality.      Impression    IMPRESSION:  1. Endotracheal tube projecting 4.7 cm cephalad to the thu. Stable  positioning of additional supportive devices.  2. Unchanged mixed interstitial and airspace opacities throughout the  lungs representing ARDS versus pulmonary edema or infection.    I have personally reviewed the examination and initial interpretation  and I agree with the findings.    AGNES BLUNT MD   XR Chest Port 1 View    Narrative    EXAM: XR CHEST PORT 1 VW  10/6/2020 1:40 AM     HISTORY:  Check endotracheal tube placement and ECLS cannula  placement. DO NOT log-roll patient.  Place film under patient using  patient safety handling process.       COMPARISON:  Chest x-ray 10/5/2020.    FINDINGS: AP radiograph of the chest. Endotracheal tube projecting 4.6  cm cephalad to the thu. Inferior approach ECMO cannula with the tip  overlying the mid SVC. Intra-aortic balloon pump marker at the level  of the thu. Esophageal temperature probe with the tip overlying the  lower cervical esophagus. Partial visualization of enteric tube with  the tip and side-port overlying the stomach. Tip points superiorly in  the fundus. Stable cardiomediastinal silhouette. No pneumothorax or  pleural " effusion. Slightly decreased mixed interstitial and airspace  opacities in the right thorax. Airspace changes in the left thorax  have increased. The visualized upper abdomen is unremarkable. No acute  osseous abnormality.       Impression    IMPRESSION:  1. Slightly decreased mixed interstitial and airspace opacities  throughout the right thorax. Airspace changes in the left thorax have  increased.  2. Stable supportive devices.    I have personally reviewed the examination and initial interpretation  and I agree with the findings.    TAMMY SANCHEZ MD   CT Head w/o Contrast     Value    Radiologist flags Diffuse cerebral edema with multifocal infarcts (AA)    Narrative    CT HEAD W/O CONTRAST 10/5/2020 2:36 PM    Provided History: s/p Cardiac arrest, on ECMO, pupils fixed/dilated    Comparison: CT head 10/2/2020.    Technique: Using multidetector thin collimation helical acquisition  technique, axial, coronal and sagittal CT images from the skull base  to the vertex were obtained without intravenous contrast.     Findings:    There is diffuse loss of the gray-white differentiation. Significant  hypodensities are present in the bilateral basal ganglia, most  pronounced involving the bilateral corpus striatum. There is  additional involvement of the bilateral thalami, the bilateral  paramedian posterior parietal lobes and the posteroinferior temporal  lobes.    There is no hydrocephalus. Moderate effacement of the fourth  ventricle. Basal cisterns are effaced.    There is no depressed calvarial fracture. Air-fluid levels noted in  paranasal sinuses and mastoid air cells.       Impression    Impression:   Findings of diffuse cerebral edema with sulcal and basal cistern  effacement. Infarcts involving the bilateral corpus stratum and to  lesser degree thalami. Additional infarcts of the bilateral paramedian  right greater than left parietal lobes.    [Critical Result: Diffuse cerebral edema with multifocal  infarcts]    Finding was identified on 10/5/2020 2:45 PM.     Dr. Conway was contacted by Dr. Vasquez at 10/5/2020 2:55 PM and  verbalized understanding of the critical finding.     I have personally reviewed the examination and initial interpretation  and I agree with the findings.    DIMITRI MARCUS MD   XR Chest Port 1 View    Narrative    EXAM: XR CHEST PORT 1 VW  10/7/2020 1:54 AM     HISTORY:  Check endotracheal tube placement and ECLS cannula  placement.       COMPARISON:  Chest x-ray 10/6/2020.    FINDINGS: AP radiograph of the chest. Endotracheal tube projecting  over the midthoracic trachea. Inferior approach ECMO cannula with the  tip overlying the mid SVC. Intra-aortic balloon pump marker projecting  near the thu. Esophageal temperature probe overlying the cervical  esophagus. Partial visualization of enteric tube with the tip  overlying the stomach directed towards the fundus and the side port  outside of the field of view.    Stable cardiomediastinal silhouette. No pleural effusion or  pneumothorax. Unchanged to slightly improved diffuse hazy opacities  throughout the lungs. The visualized upper abdomen is unremarkable.  The bones are stable.      Impression    IMPRESSION:  1. Unchanged to slightly decreased mixed interstitial and airspace  hazy opacities throughout the lungs.  2. Stable support devices.    I have personally reviewed the examination and initial interpretation  and I agree with the findings.    JEZ ENG MD   CT Head w/o Contrast    Narrative    CT HEAD W/O CONTRAST 10/6/2020 2:13 PM    History: s/p cardiac arrest   ICD-10: Cardiac arrest    Comparison: CT of the head dated 10/5/2020    Technique: Using multidetector thin collimation helical acquisition  technique, axial, coronal and sagittal CT images from the skull base  to the vertex were obtained without intravenous contrast.    Findings:     There is diffuse effacement of the cerebral sulci, effacement of the  basal  cisterns, partial effacement of the lateral ventricles  ventricular system due to increased intracranial pressure. There are  are areas of hyperdensity along the left frontoparietal sulci (series  3, image 21) which is either due to pseudo subarachnoid hemorrhage due  to a diffuse cerebral edema or real minimal real blood products in the  subarachnoid spaces. Persistent hypoattenuation involving bilateral  basal ganglia, bilateral posterior thalami and right lateral thalami  and areas of hypoattenuation in bilateral parietal parasagittal more  extensive on the right, right temporal periventricular and left  temporal periventricular regions. Lateral and third ventricular  effacement has decreased. Thyroid cancer herniation is again noted.  There is also hypoattenuation in bilateral cerebellar hemispheres. The  remainder of the brain parenchyma there is loss of gray-white  differentiation. Basal cisternal effacement is also slightly  decreased. There is downward midline herniation particularly of the  third ventricle.      Impression    Impression:     Sequela of anoxic event seen as bilateral deep gray nuclei ischemic  changes and also ischemic changes involving some of the adjacent white  matter and parietal cortices as described above.    Diffuse intracranial edema with resultant effacement of the sulci,  ventricular system, basal cisterns and downward tonsillar herniation.  When compared with 10/5/2020 dated study, the sulcal effacement, basal  cisternal effacement and ventricular effacement have slightly  decreased.    I have personally reviewed the examination and initial interpretation  and I agree with the findings.    TY ROMAN MD   XR Chest Port 1 View    Narrative    EXAM: XR CHEST PORT 1 VW  10/8/2020 2:00 AM     HISTORY:  Check endotracheal tube placement and ECLS cannula  placement. DO NOT log-roll patient.  Place film under patient using  patient safety handling process.       COMPARISON:  Chest  x-ray 10/7/2020.    FINDINGS: AP radiograph of the chest. Endotracheal tube projecting 4.7  cm cephalad to the thu. Inferior approach ECMO cannula with the tip  overlying the mid SVC. Intra-aortic balloon pump marker near the level  of the thu. Partial visualization of NG/OG with the tip overlying  the stomach directed towards the fundus.    Stable cardial mediastinal silhouette. Small left pleural effusion. No  pneumothorax. Improved mixed interstitial and airspace opacities  throughout the lungs. Visualized upper abdomen is unremarkable. The  bones are stable.      Impression    IMPRESSION:  1. Endotracheal tube overlying the mid thoracic trachea. Stable  additional supportive devices.  2. Decreased mixed interstitial and airspace opacities throughout the  lungs.  3. Small left pleural effusion.    I have personally reviewed the examination and initial interpretation  and I agree with the findings.    JEZ ENG MD   XR Chest Port 1 View     Value    Radiologist flags Probable right upper lobar Bronchial plugging (Urgent)    Narrative    XR CHEST PORT 1 VW  10/8/2020 10:23 PM      HISTORY: Evaluate ET tube placement    COMPARISON: Chest x-ray 10/8/2020, 10/7/2020    TECHNIQUE: Supine frontal view of the chest    FINDINGS: Complete collapse of the right upper lobe with a few air  bronchograms. Remainder of the lungs appear clear. No pneumothorax or  significant pleural effusion. Heart size is normal. Trachea is  midline. Upper abdomen is unremarkable. No suspicious osseous lesion.    Endotracheal tube tip projects over the midthoracic trachea. ECMO  cannula from inferior approach with tip projecting over the mid SVC.  Intra-aortic balloon pump radio opaque marker is just below the aortic  knob. Enteric tube projecting over the stomach.      Impression    IMPRESSION:   1. New complete collapse of the right upper lobe compared to same day  x-ray at 0150 hours. Endobronchial plugging is suspected.  Consider  bronchoscopy.  2. Endotracheal tube tip projects over the midthoracic trachea. Other  support devices are stable.    [Urgent Result: Probable right upper lobar Bronchial plugging]    Finding was identified on 10/8/2020 10:27 PM.     Dr. Soto was contacted by Dr. Brown at 10/8/2020 10:31 PM and  verbalized understanding of the urgent finding.     I have personally reviewed the examination and initial interpretation  and I agree with the findings.    MIKE CHURCH MD   XR Chest Port 1 View    Narrative    EXAM: XR CHEST PORT 1 VW  10/9/2020 2:51 AM     HISTORY:  Post bronchoscopy.       COMPARISON:  Chest x-ray 10/8/2020.    FINDINGS: AP radiograph of the chest. Endotracheal tube projecting 3.2  cm cephalad to the thu. Inferior approach ECMO cannula with the tip  overlying the mid SVC. Intra-aortic ballon pump marker at the level of  the thu. Enteric tube with the tip inside port overlying the  stomach with the tip directed towards the fundus.    Improved aeration of the right upper lung with moderate persistent  consolidative opacification of the right upper lobe consistent with  partial collapse. Small left pleural effusion. Stable cardiac  silhouette. The visualized upper abdomen is unremarkable. No acute  osseous abnormality.      Impression    IMPRESSION:  1. Improved aeration to the right upper lobe following bronchoscopy  with moderate persistent consolidation of the right upper lobe  suggestive of partial collapse.  2. Small left pleural effusion.  3. Stable position of additional supportive devices.    I have personally reviewed the examination and initial interpretation  and I agree with the findings.    TAMMY SANCHEZ MD   NM Brain Scan Complete w Vascular Flow     Value    Radiologist flags No evidence of brain perfusion. (Urgent)    Narrative    BRAIN PERFUSION: NM BRAIN SCAN COMPLETE W VASCULAR FLOW    DATE: 10/9/2020 2:53 PM    HISTORY: brain death  assessment    COMPARISON: CT head 10/6/2020    TECHNIQUE:   When possible a head band is placed to reduced the external carotid  circulation over the scalp. Dynamin images were obtained after the IV  administration of 20 mCi of Tc99m Ceretec.  Static images were  obtained after the dynamic imaging series to look for intercranial  venous circulation.    FINDINGS:    The flow series demonstrates no perfusion to the brain.    The delayed static images demonstrates no perfusion to the brain.      Impression    IMPRESSION: No evidence of brain perfusion.    [Urgent Result: No evidence of brain perfusion.]    Finding was identified on 10/9/2020 3:19 PM.     [facility] was contacted by Dr. Duarte at 10/9/2020 3:25 PM and  verbalized understanding of the urgent finding.     Echocardiogram Complete    Narrative    [id number]  [id number]  [id number]  [id number]^NATALI^ARTHUR           [facility],[place]  Echocardiography Laboratory  [address] [place] [id number]     Name: HOMERO RAMÍREZ  MRN: [id number]  : 1898  Study Date: 10/03/2020 11:34 AM  Age: 89 yrs  Gender: Female  Patient Location: [facility]  Reason For Study: Cardiac Arrest  Ordering Physician: ARTHUR HURST  Performed By: NEHEMIAS Turcios     BP: 107/45 mmHg  _____________________________________________________________________________  __        Procedure  Complete Portable Echo Adult.  _____________________________________________________________________________  __        Interpretation Summary  VA ECMO.  Severely (EF 10-20%) reduced left ventricular function is present. Large area  of anteroapical akinesis.  Right ventricular function, chamber size, wall motion, and thickness are  normal.  No pericardial effusion is present.  There is no prior study for direct comparison.  _____________________________________________________________________________  __        Left Ventricle  Left ventricular wall thickness is  normal. Left ventricular size is normal.  Severely (EF 10-20%) reduced left ventricular function is present. Left  ventricular diastolic function is not assessable. Apical wall akinesis is  present. Anterior wall akinesis is present.     Right Ventricle  Right ventricular function, chamber size, wall motion, and thickness are  normal.     Atria  Both atria appear normal.     Mitral Valve  The mitral valve is normal.     Aortic Valve  Aortic valve is normal in structure and function.        Tricuspid Valve  The tricuspid valve is normal. The peak velocity of the tricuspid regurgitant  jet is not obtainable.     Pulmonic Valve  The pulmonic valve is normal.     Vessels  The aorta root is normal. The inferior vena cava is normal.     Pericardium  No pericardial effusion is present.     Compared to Previous Study  There is no prior study for direct comparison.     _____________________________________________________________________________  __  MMode/2D Measurements & Calculations  IVSd: 1.1 cm  LVIDd: 3.8 cm  LVIDs: 3.2 cm  LVPWd: 0.90 cm  FS: 15.1 %     LV mass(C)d: 113.1 grams     EF(MOD-bp): 13.2 %  RWT: 0.47        Doppler Measurements & Calculations  PA V2 max: 66.9 cm/sec  PA max P.8 mmHg     _____________________________________________________________________________  __           Report approved by: Alex Vega 10/03/2020 02:10 PM      Echo Limited    Narrative    [id number]  [id number]  [id number]  [id number]^CHERRI^KAVITA^SEBLE           [facility],[place]  Echocardiography Laboratory  [address] [place] [id number]     Name: BRYON SOOD  MRN: [id number]  : 1975  Study Date: 10/04/2020 04:41 PM  Age: 45 yrs  Gender: Female  Patient Location: [facility]  Reason For Study: CAD  Ordering Physician: KAVITA HARLEY  Performed By: EDY Ashley     BSA: 2.0 m2  Height: 66 in  Weight: 211 lb  HR: 102  BP: 110/56  mmHg  _____________________________________________________________________________  __        Procedure  Limited Portable Echo Adult.  _____________________________________________________________________________  __        Interpretation Summary  VA ECMO turndown from 3.4 to 1.5L with the IABP off.     Baseline: Mildly (EF 40-45%) reduced left ventricular function is present.  Apical wall akinesis is present.  With turndown, LV fxn improves to 45-50%.  Right ventricular function, chamber size, wall motion, and thickness are  normal.  No pericardial effusion is present.  Compared to prior, LV fxn is improved.  _____________________________________________________________________________  __        Left Ventricle  Mildly (EF 40-45%) reduced left ventricular function is present. Apical wall  akinesis is present.     Right Ventricle  Right ventricular function, chamber size, wall motion, and thickness are  normal.     Mitral Valve  The mitral valve is normal.     Aortic Valve  Aortic valve is normal in structure and function.        Tricuspid Valve  The tricuspid valve is normal.     Pulmonic Valve  The pulmonic valve is normal.     Pericardium  No pericardial effusion is present.  _____________________________________________________________________________  __     MMode/2D Measurements & Calculations  IVSd: 1.3 cm  LVIDd: 3.8 cm  LVIDs: 2.7 cm  LVPWd: 1.1 cm  FS: 28.3 %  LV mass(C)d: 158.7 grams  LV mass(C)dI: 77.6 grams/m2  RWT: 0.60              _____________________________________________________________________________  __        Report approved by: Alex Vega 10/04/2020 05:36 PM      Cardiac Catheterization    Narrative    --Refractory VF cardiac arrest.  --Successful insertion of peripheral V-A Extracorporeal Membrane   Oxygenation - in the right femoral artery and and left femoral vein.  --Successful insertion of a distal antegrade flow cannula in the right   SFA.  --Single vessel coronary artery  disease without left main involvement.   --Successful deployment of a 3.5x38mm Synergy drug eluting stent to the   proximal  LAD.  --Successful insertion of a Mascomal Quattro intravenous cooling catheter in   the right femoral vein.  --Successful insertion of intra-aortic balloon pump in the left common   femoral artery.  --Successful insertion of right radial arterial line for hemodynamic   monitoring.

## 2020-10-10 ENCOUNTER — ANESTHESIA EVENT (OUTPATIENT)
Dept: SURGERY | Facility: CLINIC | Age: 45
DRG: 003 | End: 2020-10-10
Payer: COMMERCIAL

## 2020-10-10 ENCOUNTER — ANESTHESIA (OUTPATIENT)
Dept: SURGERY | Facility: CLINIC | Age: 45
DRG: 003 | End: 2020-10-10
Payer: COMMERCIAL

## 2020-10-10 VITALS
HEIGHT: 66 IN | TEMPERATURE: 98.6 F | BODY MASS INDEX: 33.94 KG/M2 | OXYGEN SATURATION: 99 % | WEIGHT: 211.2 LBS | RESPIRATION RATE: 12 BRPM | HEART RATE: 103 BPM

## 2020-10-10 LAB
ABO + RH BLD: NORMAL
ABO + RH BLD: NORMAL
ALBUMIN SERPL-MCNC: 1.5 G/DL (ref 3.4–5)
ALBUMIN SERPL-MCNC: 1.5 G/DL (ref 3.4–5)
ALBUMIN SERPL-MCNC: 1.6 G/DL (ref 3.4–5)
ALBUMIN SERPL-MCNC: 2 G/DL (ref 3.4–5)
ALBUMIN UR-MCNC: 30 MG/DL
ALP SERPL-CCNC: 101 U/L (ref 40–150)
ALP SERPL-CCNC: 81 U/L (ref 40–150)
ALP SERPL-CCNC: 82 U/L (ref 40–150)
ALP SERPL-CCNC: 89 U/L (ref 40–150)
ALT SERPL W P-5'-P-CCNC: 114 U/L (ref 0–50)
ALT SERPL W P-5'-P-CCNC: 84 U/L (ref 0–50)
ALT SERPL W P-5'-P-CCNC: 86 U/L (ref 0–50)
ALT SERPL W P-5'-P-CCNC: 95 U/L (ref 0–50)
AMYLASE SERPL-CCNC: 56 U/L (ref 30–110)
AMYLASE SERPL-CCNC: 59 U/L (ref 30–110)
AMYLASE SERPL-CCNC: 60 U/L (ref 30–110)
AMYLASE SERPL-CCNC: 86 U/L (ref 30–110)
ANION GAP SERPL CALCULATED.3IONS-SCNC: 10 MMOL/L (ref 3–14)
ANION GAP SERPL CALCULATED.3IONS-SCNC: 11 MMOL/L (ref 3–14)
ANION GAP SERPL CALCULATED.3IONS-SCNC: 11 MMOL/L (ref 3–14)
ANION GAP SERPL CALCULATED.3IONS-SCNC: 8 MMOL/L (ref 3–14)
APPEARANCE UR: ABNORMAL
APTT PPP: 41 SEC (ref 22–37)
APTT PPP: 42 SEC (ref 22–37)
APTT PPP: 62 SEC (ref 22–37)
APTT PPP: 67 SEC (ref 22–37)
AST SERPL W P-5'-P-CCNC: 128 U/L (ref 0–45)
AST SERPL W P-5'-P-CCNC: 133 U/L (ref 0–45)
AST SERPL W P-5'-P-CCNC: 163 U/L (ref 0–45)
AST SERPL W P-5'-P-CCNC: 244 U/L (ref 0–45)
AT III ACT/NOR PPP CHRO: 74 % (ref 85–135)
BACTERIA SPEC CULT: NO GROWTH
BASE DEFICIT BLDA-SCNC: 2.4 MMOL/L
BASE DEFICIT BLDA-SCNC: 3.7 MMOL/L
BASE DEFICIT BLDA-SCNC: 4.9 MMOL/L
BASE DEFICIT BLDA-SCNC: 5.8 MMOL/L
BASE DEFICIT BLDA-SCNC: 6.1 MMOL/L
BASE DEFICIT BLDA-SCNC: 6.2 MMOL/L
BASE DEFICIT BLDA-SCNC: 6.5 MMOL/L
BASE DEFICIT BLDA-SCNC: 6.5 MMOL/L
BASE DEFICIT BLDA-SCNC: 7.2 MMOL/L
BASE DEFICIT BLDA-SCNC: 7.5 MMOL/L
BASE DEFICIT BLDA-SCNC: 7.8 MMOL/L
BASE DEFICIT BLDA-SCNC: 8 MMOL/L
BASE DEFICIT BLDA-SCNC: 8.1 MMOL/L
BASE DEFICIT BLDV-SCNC: 8.1 MMOL/L
BILIRUB DIRECT SERPL-MCNC: 0.4 MG/DL (ref 0–0.2)
BILIRUB DIRECT SERPL-MCNC: 0.5 MG/DL (ref 0–0.2)
BILIRUB SERPL-MCNC: 1 MG/DL (ref 0.2–1.3)
BILIRUB SERPL-MCNC: 1.1 MG/DL (ref 0.2–1.3)
BILIRUB SERPL-MCNC: 1.1 MG/DL (ref 0.2–1.3)
BILIRUB SERPL-MCNC: 1.2 MG/DL (ref 0.2–1.3)
BILIRUB UR QL STRIP: NEGATIVE
BLD GP AB SCN SERPL QL: NORMAL
BLD PROD TYP BPU: NORMAL
BLD UNIT ID BPU: 0
BLOOD BANK CMNT PATIENT-IMP: NORMAL
BLOOD PRODUCT CODE: NORMAL
BPU ID: NORMAL
BUN SERPL-MCNC: 84 MG/DL (ref 7–30)
BUN SERPL-MCNC: 86 MG/DL (ref 7–30)
BUN SERPL-MCNC: 90 MG/DL (ref 7–30)
BUN SERPL-MCNC: 91 MG/DL (ref 7–30)
CA-I BLD-MCNC: 4.4 MG/DL (ref 4.4–5.2)
CA-I BLD-MCNC: 4.9 MG/DL (ref 4.4–5.2)
CALCIUM SERPL-MCNC: 7.3 MG/DL (ref 8.5–10.1)
CALCIUM SERPL-MCNC: 7.4 MG/DL (ref 8.5–10.1)
CALCIUM SERPL-MCNC: 7.5 MG/DL (ref 8.5–10.1)
CALCIUM SERPL-MCNC: 7.9 MG/DL (ref 8.5–10.1)
CHLORIDE SERPL-SCNC: 126 MMOL/L (ref 94–109)
CHLORIDE SERPL-SCNC: 126 MMOL/L (ref 94–109)
CHLORIDE SERPL-SCNC: 127 MMOL/L (ref 94–109)
CHLORIDE SERPL-SCNC: 128 MMOL/L (ref 94–109)
CO2 SERPL-SCNC: 18 MMOL/L (ref 20–32)
CO2 SERPL-SCNC: 19 MMOL/L (ref 20–32)
CO2 SERPL-SCNC: 20 MMOL/L (ref 20–32)
CO2 SERPL-SCNC: 22 MMOL/L (ref 20–32)
COLOR UR AUTO: YELLOW
CREAT SERPL-MCNC: 4.1 MG/DL (ref 0.52–1.04)
CREAT SERPL-MCNC: 4.36 MG/DL (ref 0.52–1.04)
CREAT SERPL-MCNC: 4.63 MG/DL (ref 0.52–1.04)
CREAT SERPL-MCNC: 4.74 MG/DL (ref 0.52–1.04)
CRP SERPL-MCNC: 100 MG/L (ref 0–8)
D DIMER PPP FEU-MCNC: 18.7 UG/ML FEU (ref 0–0.5)
D DIMER PPP FEU-MCNC: >20 UG/ML FEU (ref 0–0.5)
ERYTHROCYTE [DISTWIDTH] IN BLOOD BY AUTOMATED COUNT: 14.9 % (ref 10–15)
ERYTHROCYTE [DISTWIDTH] IN BLOOD BY AUTOMATED COUNT: 15 % (ref 10–15)
ERYTHROCYTE [DISTWIDTH] IN BLOOD BY AUTOMATED COUNT: 15.2 % (ref 10–15)
ERYTHROCYTE [DISTWIDTH] IN BLOOD BY AUTOMATED COUNT: 15.6 % (ref 10–15)
ERYTHROCYTE [SEDIMENTATION RATE] IN BLOOD BY WESTERGREN METHOD: 72 MM/H (ref 0–20)
FIBRINOGEN PPP-MCNC: 495 MG/DL (ref 200–420)
FIBRINOGEN PPP-MCNC: 624 MG/DL (ref 200–420)
GFR SERPL CREATININE-BSD FRML MDRD: 10 ML/MIN/{1.73_M2}
GFR SERPL CREATININE-BSD FRML MDRD: 11 ML/MIN/{1.73_M2}
GFR SERPL CREATININE-BSD FRML MDRD: 12 ML/MIN/{1.73_M2}
GFR SERPL CREATININE-BSD FRML MDRD: 12 ML/MIN/{1.73_M2}
GLUCOSE BLD-MCNC: 150 MG/DL (ref 70–99)
GLUCOSE BLD-MCNC: 198 MG/DL (ref 70–99)
GLUCOSE BLD-MCNC: 202 MG/DL (ref 70–99)
GLUCOSE BLD-MCNC: 228 MG/DL (ref 70–99)
GLUCOSE BLDC GLUCOMTR-MCNC: 141 MG/DL (ref 70–99)
GLUCOSE BLDC GLUCOMTR-MCNC: 150 MG/DL (ref 70–99)
GLUCOSE BLDC GLUCOMTR-MCNC: 152 MG/DL (ref 70–99)
GLUCOSE BLDC GLUCOMTR-MCNC: 158 MG/DL (ref 70–99)
GLUCOSE BLDC GLUCOMTR-MCNC: 168 MG/DL (ref 70–99)
GLUCOSE BLDC GLUCOMTR-MCNC: 175 MG/DL (ref 70–99)
GLUCOSE BLDC GLUCOMTR-MCNC: 176 MG/DL (ref 70–99)
GLUCOSE BLDC GLUCOMTR-MCNC: 177 MG/DL (ref 70–99)
GLUCOSE BLDC GLUCOMTR-MCNC: 205 MG/DL (ref 70–99)
GLUCOSE BLDC GLUCOMTR-MCNC: 205 MG/DL (ref 70–99)
GLUCOSE BLDC GLUCOMTR-MCNC: 217 MG/DL (ref 70–99)
GLUCOSE BLDC GLUCOMTR-MCNC: 226 MG/DL (ref 70–99)
GLUCOSE BLDC GLUCOMTR-MCNC: 236 MG/DL (ref 70–99)
GLUCOSE SERPL-MCNC: 148 MG/DL (ref 70–99)
GLUCOSE SERPL-MCNC: 192 MG/DL (ref 70–99)
GLUCOSE SERPL-MCNC: 192 MG/DL (ref 70–99)
GLUCOSE SERPL-MCNC: 228 MG/DL (ref 70–99)
GLUCOSE UR STRIP-MCNC: NEGATIVE MG/DL
HCO3 BLD-SCNC: 17 MMOL/L (ref 21–28)
HCO3 BLD-SCNC: 18 MMOL/L (ref 21–28)
HCO3 BLD-SCNC: 19 MMOL/L (ref 21–28)
HCO3 BLD-SCNC: 20 MMOL/L (ref 21–28)
HCO3 BLD-SCNC: 20 MMOL/L (ref 21–28)
HCO3 BLD-SCNC: 21 MMOL/L (ref 21–28)
HCO3 BLDA-SCNC: 17 MMOL/L (ref 21–28)
HCO3 BLDV-SCNC: 19 MMOL/L (ref 21–28)
HCT VFR BLD AUTO: 21.4 % (ref 35–47)
HCT VFR BLD AUTO: 22.2 % (ref 35–47)
HCT VFR BLD AUTO: 23 % (ref 35–47)
HCT VFR BLD AUTO: 26.7 % (ref 35–47)
HGB BLD-MCNC: 7.1 G/DL (ref 11.7–15.7)
HGB BLD-MCNC: 7.3 G/DL (ref 11.7–15.7)
HGB BLD-MCNC: 7.7 G/DL (ref 11.7–15.7)
HGB BLD-MCNC: 8.5 G/DL (ref 11.7–15.7)
HGB FREE PLAS-MCNC: <30 MG/DL
HGB UR QL STRIP: ABNORMAL
INR PPP: 1.49 (ref 0.86–1.14)
INR PPP: 1.66 (ref 0.86–1.14)
INR PPP: 1.81 (ref 0.86–1.14)
INR PPP: 1.83 (ref 0.86–1.14)
KCT BLD-ACNC: 137 SEC (ref 75–150)
KCT BLD-ACNC: 158 SEC (ref 75–150)
KCT BLD-ACNC: 162 SEC (ref 75–150)
KCT BLD-ACNC: 162 SEC (ref 75–150)
KCT BLD-ACNC: 171 SEC (ref 75–150)
KCT BLD-ACNC: 175 SEC (ref 75–150)
KETONES UR STRIP-MCNC: NEGATIVE MG/DL
LACTATE BLD-SCNC: 0.9 MMOL/L (ref 0.7–2)
LACTATE BLD-SCNC: 1.3 MMOL/L (ref 0.7–2)
LACTATE BLD-SCNC: 1.5 MMOL/L (ref 0.7–2)
LACTATE BLD-SCNC: 1.6 MMOL/L (ref 0.7–2)
LDH SERPL L TO P-CCNC: 1047 U/L (ref 81–234)
LEUKOCYTE ESTERASE UR QL STRIP: ABNORMAL
LIPASE SERPL-CCNC: 140 U/L (ref 73–393)
LIPASE SERPL-CCNC: 148 U/L (ref 73–393)
LIPASE SERPL-CCNC: 172 U/L (ref 73–393)
LIPASE SERPL-CCNC: 254 U/L (ref 73–393)
LMWH PPP CHRO-ACNC: 0.11 IU/ML
LMWH PPP CHRO-ACNC: 0.18 IU/ML
LMWH PPP CHRO-ACNC: 0.34 IU/ML
LMWH PPP CHRO-ACNC: 0.57 IU/ML
MAGNESIUM SERPL-MCNC: 2.4 MG/DL (ref 1.6–2.3)
MAGNESIUM SERPL-MCNC: 2.5 MG/DL (ref 1.6–2.3)
MCH RBC QN AUTO: 30.4 PG (ref 26.5–33)
MCH RBC QN AUTO: 30.5 PG (ref 26.5–33)
MCH RBC QN AUTO: 30.7 PG (ref 26.5–33)
MCH RBC QN AUTO: 31.2 PG (ref 26.5–33)
MCHC RBC AUTO-ENTMCNC: 31.8 G/DL (ref 31.5–36.5)
MCHC RBC AUTO-ENTMCNC: 32.9 G/DL (ref 31.5–36.5)
MCHC RBC AUTO-ENTMCNC: 33.2 G/DL (ref 31.5–36.5)
MCHC RBC AUTO-ENTMCNC: 33.5 G/DL (ref 31.5–36.5)
MCV RBC AUTO: 93 FL (ref 78–100)
MCV RBC AUTO: 96 FL (ref 78–100)
NITRATE UR QL: NEGATIVE
NUM BPU REQUESTED: 8
O2/TOTAL GAS SETTING VFR VENT: 0.4 %
O2/TOTAL GAS SETTING VFR VENT: 40 %
O2/TOTAL GAS SETTING VFR VENT: 40 %
O2/TOTAL GAS SETTING VFR VENT: 50 %
O2/TOTAL GAS SETTING VFR VENT: 50 %
O2/TOTAL GAS SETTING VFR VENT: 60 %
OXYHGB MFR BLD: 95 % (ref 92–100)
OXYHGB MFR BLD: 96 % (ref 92–100)
OXYHGB MFR BLD: 97 % (ref 92–100)
OXYHGB MFR BLD: 98 % (ref 92–100)
OXYHGB MFR BLD: 99 % (ref 92–100)
OXYHGB MFR BLDA: 98 % (ref 75–100)
OXYHGB MFR BLDV: 91 %
PCO2 BLD: 24 MM HG (ref 35–45)
PCO2 BLD: 26 MM HG (ref 35–45)
PCO2 BLD: 28 MM HG (ref 35–45)
PCO2 BLD: 28 MM HG (ref 35–45)
PCO2 BLD: 29 MM HG (ref 35–45)
PCO2 BLD: 30 MM HG (ref 35–45)
PCO2 BLD: 30 MM HG (ref 35–45)
PCO2 BLD: 31 MM HG (ref 35–45)
PCO2 BLD: 31 MM HG (ref 35–45)
PCO2 BLD: 38 MM HG (ref 35–45)
PCO2 BLD: 39 MM HG (ref 35–45)
PCO2 BLD: 47 MM HG (ref 35–45)
PCO2 BLD: 49 MM HG (ref 35–45)
PCO2 BLDA: 34 MM HG (ref 35–45)
PCO2 BLDV: 42 MM HG (ref 40–50)
PH BLD: 7.22 PH (ref 7.35–7.45)
PH BLD: 7.23 PH (ref 7.35–7.45)
PH BLD: 7.29 PH (ref 7.35–7.45)
PH BLD: 7.31 PH (ref 7.35–7.45)
PH BLD: 7.36 PH (ref 7.35–7.45)
PH BLD: 7.37 PH (ref 7.35–7.45)
PH BLD: 7.4 PH (ref 7.35–7.45)
PH BLD: 7.42 PH (ref 7.35–7.45)
PH BLD: 7.43 PH (ref 7.35–7.45)
PH BLD: 7.43 PH (ref 7.35–7.45)
PH BLD: 7.44 PH (ref 7.35–7.45)
PH BLD: 7.45 PH (ref 7.35–7.45)
PH BLD: 7.47 PH (ref 7.35–7.45)
PH BLDA: 7.32 PH (ref 7.35–7.45)
PH BLDV: 7.26 PH (ref 7.32–7.43)
PH UR STRIP: 5.5 PH (ref 5–7)
PHOSPHATE SERPL-MCNC: 3.8 MG/DL (ref 2.5–4.5)
PHOSPHATE SERPL-MCNC: 4.6 MG/DL (ref 2.5–4.5)
PHOSPHATE SERPL-MCNC: 4.9 MG/DL (ref 2.5–4.5)
PHOSPHATE SERPL-MCNC: 7.6 MG/DL (ref 2.5–4.5)
PLATELET # BLD AUTO: 62 10E9/L (ref 150–450)
PLATELET # BLD AUTO: 63 10E9/L (ref 150–450)
PLATELET # BLD AUTO: 65 10E9/L (ref 150–450)
PLATELET # BLD AUTO: 66 10E9/L (ref 150–450)
PO2 BLD: 104 MM HG (ref 80–105)
PO2 BLD: 106 MM HG (ref 80–105)
PO2 BLD: 116 MM HG (ref 80–105)
PO2 BLD: 118 MM HG (ref 80–105)
PO2 BLD: 123 MM HG (ref 80–105)
PO2 BLD: 125 MM HG (ref 80–105)
PO2 BLD: 131 MM HG (ref 80–105)
PO2 BLD: 132 MM HG (ref 80–105)
PO2 BLD: 133 MM HG (ref 80–105)
PO2 BLD: 143 MM HG (ref 80–105)
PO2 BLD: 172 MM HG (ref 80–105)
PO2 BLD: 209 MM HG (ref 80–105)
PO2 BLD: 67 MM HG (ref 80–105)
PO2 BLDA: 318 MM HG (ref 80–105)
PO2 BLDV: 67 MM HG (ref 25–47)
POTASSIUM BLD-SCNC: 4.2 MMOL/L (ref 3.4–5.3)
POTASSIUM BLD-SCNC: 4.2 MMOL/L (ref 3.4–5.3)
POTASSIUM BLD-SCNC: 6 MMOL/L (ref 3.4–5.3)
POTASSIUM SERPL-SCNC: 3.6 MMOL/L (ref 3.4–5.3)
POTASSIUM SERPL-SCNC: 4.2 MMOL/L (ref 3.4–5.3)
POTASSIUM SERPL-SCNC: 4.2 MMOL/L (ref 3.4–5.3)
POTASSIUM SERPL-SCNC: 4.3 MMOL/L (ref 3.4–5.3)
POTASSIUM SERPL-SCNC: 6.3 MMOL/L (ref 3.4–5.3)
PROT SERPL-MCNC: 4 G/DL (ref 6.8–8.8)
PROT SERPL-MCNC: 4 G/DL (ref 6.8–8.8)
PROT SERPL-MCNC: 4.3 G/DL (ref 6.8–8.8)
PROT SERPL-MCNC: 5.1 G/DL (ref 6.8–8.8)
RADIOLOGIST FLAGS: ABNORMAL
RBC # BLD AUTO: 2.31 10E12/L (ref 3.8–5.2)
RBC # BLD AUTO: 2.4 10E12/L (ref 3.8–5.2)
RBC # BLD AUTO: 2.47 10E12/L (ref 3.8–5.2)
RBC # BLD AUTO: 2.79 10E12/L (ref 3.8–5.2)
SODIUM SERPL-SCNC: 153 MMOL/L (ref 133–144)
SODIUM SERPL-SCNC: 154 MMOL/L (ref 133–144)
SODIUM SERPL-SCNC: 157 MMOL/L (ref 133–144)
SODIUM SERPL-SCNC: 162 MMOL/L (ref 133–144)
SOURCE: ABNORMAL
SP GR UR STRIP: 1.01 (ref 1–1.03)
SPECIMEN EXP DATE BLD: NORMAL
SPECIMEN SOURCE: NORMAL
TRANSFUSION STATUS PATIENT QL: NORMAL
TROPONIN I SERPL-MCNC: 12.02 UG/L (ref 0–0.04)
TROPONIN I SERPL-MCNC: 7.29 UG/L (ref 0–0.04)
TROPONIN I SERPL-MCNC: 7.36 UG/L (ref 0–0.04)
TROPONIN I SERPL-MCNC: 7.97 UG/L (ref 0–0.04)
UROBILINOGEN UR STRIP-MCNC: NORMAL MG/DL (ref 0–2)
WBC # BLD AUTO: 15 10E9/L (ref 4–11)
WBC # BLD AUTO: 17.8 10E9/L (ref 4–11)
WBC # BLD AUTO: 22.7 10E9/L (ref 4–11)
WBC # BLD AUTO: 27.1 10E9/L (ref 4–11)

## 2020-10-10 PROCEDURE — 360N000043 HC SURGERY LEVEL 6 W FLUORO 1ST 30 MIN - UMMC

## 2020-10-10 PROCEDURE — 33949 ECMO/ECLS DAILY MGMT ARTERY: CPT

## 2020-10-10 PROCEDURE — 258N000003 HC RX IP 258 OP 636: Performed by: STUDENT IN AN ORGANIZED HEALTH CARE EDUCATION/TRAINING PROGRAM

## 2020-10-10 PROCEDURE — 999N001017 HC STATISTIC GLUCOSE BY METER IP

## 2020-10-10 PROCEDURE — 84132 ASSAY OF SERUM POTASSIUM: CPT | Performed by: STUDENT IN AN ORGANIZED HEALTH CARE EDUCATION/TRAINING PROGRAM

## 2020-10-10 PROCEDURE — 82947 ASSAY GLUCOSE BLOOD QUANT: CPT | Performed by: STUDENT IN AN ORGANIZED HEALTH CARE EDUCATION/TRAINING PROGRAM

## 2020-10-10 PROCEDURE — 250N000011 HC RX IP 250 OP 636: Performed by: STUDENT IN AN ORGANIZED HEALTH CARE EDUCATION/TRAINING PROGRAM

## 2020-10-10 PROCEDURE — 258N000003 HC RX IP 258 OP 636: Performed by: NURSE ANESTHETIST, CERTIFIED REGISTERED

## 2020-10-10 PROCEDURE — 258N000003 HC RX IP 258 OP 636

## 2020-10-10 PROCEDURE — 250N000011 HC RX IP 250 OP 636: Performed by: NURSE PRACTITIONER

## 2020-10-10 PROCEDURE — 370N000001 HC ANESTHESIA TECHNICAL FEE, 1ST 30 MIN

## 2020-10-10 PROCEDURE — 83615 LACTATE (LD) (LDH) ENZYME: CPT | Performed by: STUDENT IN AN ORGANIZED HEALTH CARE EDUCATION/TRAINING PROGRAM

## 2020-10-10 PROCEDURE — 999N000157 HC STATISTIC RCP TIME EA 10 MIN

## 2020-10-10 PROCEDURE — 85520 HEPARIN ASSAY: CPT | Performed by: STUDENT IN AN ORGANIZED HEALTH CARE EDUCATION/TRAINING PROGRAM

## 2020-10-10 PROCEDURE — 84132 ASSAY OF SERUM POTASSIUM: CPT | Performed by: INTERNAL MEDICINE

## 2020-10-10 PROCEDURE — 85730 THROMBOPLASTIN TIME PARTIAL: CPT | Performed by: STUDENT IN AN ORGANIZED HEALTH CARE EDUCATION/TRAINING PROGRAM

## 2020-10-10 PROCEDURE — 360N000041 HC SURGERY LEVEL 6 EA 15 ADDTL MIN - UMMC

## 2020-10-10 PROCEDURE — 83605 ASSAY OF LACTIC ACID: CPT | Performed by: INTERNAL MEDICINE

## 2020-10-10 PROCEDURE — 250N000011 HC RX IP 250 OP 636: Performed by: NURSE ANESTHETIST, CERTIFIED REGISTERED

## 2020-10-10 PROCEDURE — 93005 ELECTROCARDIOGRAM TRACING: CPT

## 2020-10-10 PROCEDURE — 83605 ASSAY OF LACTIC ACID: CPT | Performed by: STUDENT IN AN ORGANIZED HEALTH CARE EDUCATION/TRAINING PROGRAM

## 2020-10-10 PROCEDURE — 83735 ASSAY OF MAGNESIUM: CPT | Performed by: STUDENT IN AN ORGANIZED HEALTH CARE EDUCATION/TRAINING PROGRAM

## 2020-10-10 PROCEDURE — 250N000013 HC RX MED GY IP 250 OP 250 PS 637

## 2020-10-10 PROCEDURE — 82805 BLOOD GASES W/O2 SATURATION: CPT | Performed by: INTERNAL MEDICINE

## 2020-10-10 PROCEDURE — 250N000011 HC RX IP 250 OP 636

## 2020-10-10 PROCEDURE — 250N000009 HC RX 250: Performed by: STUDENT IN AN ORGANIZED HEALTH CARE EDUCATION/TRAINING PROGRAM

## 2020-10-10 PROCEDURE — 82150 ASSAY OF AMYLASE: CPT | Performed by: STUDENT IN AN ORGANIZED HEALTH CARE EDUCATION/TRAINING PROGRAM

## 2020-10-10 PROCEDURE — 250N000011 HC RX IP 250 OP 636: Performed by: INTERNAL MEDICINE

## 2020-10-10 PROCEDURE — 258N000001 HC RX 258

## 2020-10-10 PROCEDURE — 84100 ASSAY OF PHOSPHORUS: CPT | Performed by: STUDENT IN AN ORGANIZED HEALTH CARE EDUCATION/TRAINING PROGRAM

## 2020-10-10 PROCEDURE — 82805 BLOOD GASES W/O2 SATURATION: CPT | Performed by: STUDENT IN AN ORGANIZED HEALTH CARE EDUCATION/TRAINING PROGRAM

## 2020-10-10 PROCEDURE — 250N000009 HC RX 250

## 2020-10-10 PROCEDURE — 370N000002 HC ANESTHESIA TECHNICAL FEE, EACH ADDTL 15 MIN

## 2020-10-10 PROCEDURE — 272N000001 HC OR GENERAL SUPPLY STERILE

## 2020-10-10 PROCEDURE — 86140 C-REACTIVE PROTEIN: CPT | Performed by: STUDENT IN AN ORGANIZED HEALTH CARE EDUCATION/TRAINING PROGRAM

## 2020-10-10 PROCEDURE — 84484 ASSAY OF TROPONIN QUANT: CPT | Performed by: STUDENT IN AN ORGANIZED HEALTH CARE EDUCATION/TRAINING PROGRAM

## 2020-10-10 PROCEDURE — 360N000035 HC SURGERY LEVEL 5 EA 15 ADDTL MIN - UMMC

## 2020-10-10 PROCEDURE — 82947 ASSAY GLUCOSE BLOOD QUANT: CPT | Performed by: INTERNAL MEDICINE

## 2020-10-10 PROCEDURE — 81003 URINALYSIS AUTO W/O SCOPE: CPT

## 2020-10-10 PROCEDURE — 83051 HEMOGLOBIN PLASMA: CPT | Performed by: STUDENT IN AN ORGANIZED HEALTH CARE EDUCATION/TRAINING PROGRAM

## 2020-10-10 PROCEDURE — 82330 ASSAY OF CALCIUM: CPT | Performed by: INTERNAL MEDICINE

## 2020-10-10 PROCEDURE — 85379 FIBRIN DEGRADATION QUANT: CPT | Performed by: STUDENT IN AN ORGANIZED HEALTH CARE EDUCATION/TRAINING PROGRAM

## 2020-10-10 PROCEDURE — 85652 RBC SED RATE AUTOMATED: CPT | Performed by: STUDENT IN AN ORGANIZED HEALTH CARE EDUCATION/TRAINING PROGRAM

## 2020-10-10 PROCEDURE — 83690 ASSAY OF LIPASE: CPT | Performed by: STUDENT IN AN ORGANIZED HEALTH CARE EDUCATION/TRAINING PROGRAM

## 2020-10-10 PROCEDURE — 360N000037 HC SURGERY LEVEL 5 W FLUORO 1ST 30 MIN - UMMC

## 2020-10-10 PROCEDURE — 85027 COMPLETE CBC AUTOMATED: CPT | Performed by: STUDENT IN AN ORGANIZED HEALTH CARE EDUCATION/TRAINING PROGRAM

## 2020-10-10 PROCEDURE — 82248 BILIRUBIN DIRECT: CPT | Performed by: STUDENT IN AN ORGANIZED HEALTH CARE EDUCATION/TRAINING PROGRAM

## 2020-10-10 PROCEDURE — 85347 COAGULATION TIME ACTIVATED: CPT

## 2020-10-10 PROCEDURE — 85300 ANTITHROMBIN III ACTIVITY: CPT | Performed by: STUDENT IN AN ORGANIZED HEALTH CARE EDUCATION/TRAINING PROGRAM

## 2020-10-10 PROCEDURE — C9113 INJ PANTOPRAZOLE SODIUM, VIA: HCPCS | Performed by: STUDENT IN AN ORGANIZED HEALTH CARE EDUCATION/TRAINING PROGRAM

## 2020-10-10 PROCEDURE — 85384 FIBRINOGEN ACTIVITY: CPT | Performed by: STUDENT IN AN ORGANIZED HEALTH CARE EDUCATION/TRAINING PROGRAM

## 2020-10-10 PROCEDURE — 250N000009 HC RX 250: Performed by: NURSE ANESTHETIST, CERTIFIED REGISTERED

## 2020-10-10 PROCEDURE — 80053 COMPREHEN METABOLIC PANEL: CPT | Performed by: STUDENT IN AN ORGANIZED HEALTH CARE EDUCATION/TRAINING PROGRAM

## 2020-10-10 PROCEDURE — P9016 RBC LEUKOCYTES REDUCED: HCPCS | Performed by: INTERNAL MEDICINE

## 2020-10-10 PROCEDURE — 250N000013 HC RX MED GY IP 250 OP 250 PS 637: Performed by: NURSE PRACTITIONER

## 2020-10-10 PROCEDURE — 82330 ASSAY OF CALCIUM: CPT | Performed by: STUDENT IN AN ORGANIZED HEALTH CARE EDUCATION/TRAINING PROGRAM

## 2020-10-10 PROCEDURE — 999N000075 HC STATISTIC IABP MONITORING

## 2020-10-10 PROCEDURE — 85610 PROTHROMBIN TIME: CPT | Performed by: STUDENT IN AN ORGANIZED HEALTH CARE EDUCATION/TRAINING PROGRAM

## 2020-10-10 PROCEDURE — 200N000002 HC R&B ICU UMMC

## 2020-10-10 PROCEDURE — 258N000003 HC RX IP 258 OP 636: Performed by: INTERNAL MEDICINE

## 2020-10-10 RX ORDER — FENTANYL CITRATE 50 UG/ML
INJECTION, SOLUTION INTRAMUSCULAR; INTRAVENOUS PRN
Status: DISCONTINUED | OUTPATIENT
Start: 2020-10-10 | End: 2020-10-10

## 2020-10-10 RX ORDER — DEXTROSE MONOHYDRATE 50 MG/ML
INJECTION, SOLUTION INTRAVENOUS CONTINUOUS
Status: DISCONTINUED | OUTPATIENT
Start: 2020-10-10 | End: 2020-11-17 | Stop reason: HOSPADM

## 2020-10-10 RX ORDER — SODIUM CHLORIDE 9 MG/ML
INJECTION, SOLUTION INTRAVENOUS CONTINUOUS PRN
Status: DISCONTINUED | OUTPATIENT
Start: 2020-10-10 | End: 2020-10-10

## 2020-10-10 RX ORDER — CALCIUM GLUCONATE 94 MG/ML
1 INJECTION, SOLUTION INTRAVENOUS ONCE
Status: DISCONTINUED | OUTPATIENT
Start: 2020-10-10 | End: 2020-10-10

## 2020-10-10 RX ORDER — DEXTROSE MONOHYDRATE 25 G/50ML
25 INJECTION, SOLUTION INTRAVENOUS ONCE
Status: COMPLETED | OUTPATIENT
Start: 2020-10-10 | End: 2020-10-10

## 2020-10-10 RX ORDER — SODIUM CHLORIDE, SODIUM GLUCONATE, SODIUM ACETATE, POTASSIUM CHLORIDE AND MAGNESIUM CHLORIDE 526; 502; 368; 37; 30 MG/100ML; MG/100ML; MG/100ML; MG/100ML; MG/100ML
INJECTION, SOLUTION INTRAVENOUS CONTINUOUS PRN
Status: DISCONTINUED | OUTPATIENT
Start: 2020-10-10 | End: 2020-10-10

## 2020-10-10 RX ORDER — HEPARIN SODIUM 1000 [USP'U]/ML
INJECTION, SOLUTION INTRAVENOUS; SUBCUTANEOUS PRN
Status: DISCONTINUED | OUTPATIENT
Start: 2020-10-10 | End: 2020-10-10

## 2020-10-10 RX ADMIN — EPINEPHRINE 10 MCG: 1 INJECTION PARENTERAL at 21:47

## 2020-10-10 RX ADMIN — CALCIUM CHLORIDE 1 G: 100 INJECTION, SOLUTION INTRAVENOUS at 16:52

## 2020-10-10 RX ADMIN — NOREPINEPHRINE BITARTRATE 6.4 MCG: 1 INJECTION, SOLUTION, CONCENTRATE INTRAVENOUS at 22:30

## 2020-10-10 RX ADMIN — NOREPINEPHRINE BITARTRATE 6.4 MCG: 1 INJECTION, SOLUTION, CONCENTRATE INTRAVENOUS at 21:25

## 2020-10-10 RX ADMIN — PIPERACILLIN AND TAZOBACTAM 3.38 G: 3; .375 INJECTION, POWDER, FOR SOLUTION INTRAVENOUS at 13:07

## 2020-10-10 RX ADMIN — SODIUM CHLORIDE 1000 MG: 9 INJECTION, SOLUTION INTRAVENOUS at 10:07

## 2020-10-10 RX ADMIN — EPINEPHRINE 10 MCG: 1 INJECTION PARENTERAL at 22:34

## 2020-10-10 RX ADMIN — PIPERACILLIN AND TAZOBACTAM 3.38 G: 3; .375 INJECTION, POWDER, FOR SOLUTION INTRAVENOUS at 20:15

## 2020-10-10 RX ADMIN — EPINEPHRINE 10 MCG: 1 INJECTION PARENTERAL at 21:45

## 2020-10-10 RX ADMIN — PIPERACILLIN AND TAZOBACTAM 3.38 G: 3; .375 INJECTION, POWDER, FOR SOLUTION INTRAVENOUS at 18:38

## 2020-10-10 RX ADMIN — HEPARIN SODIUM 30000 UNITS: 1000 INJECTION INTRAVENOUS; SUBCUTANEOUS at 22:30

## 2020-10-10 RX ADMIN — DEXTROSE MONOHYDRATE: 50 INJECTION, SOLUTION INTRAVENOUS at 13:16

## 2020-10-10 RX ADMIN — NOREPINEPHRINE BITARTRATE 6.4 MCG: 1 INJECTION, SOLUTION, CONCENTRATE INTRAVENOUS at 21:29

## 2020-10-10 RX ADMIN — LEVOTHYROXINE SODIUM 50 MCG/HR: 20 INJECTION, SOLUTION INTRAVENOUS at 11:52

## 2020-10-10 RX ADMIN — EPINEPHRINE 10 MCG: 1 INJECTION PARENTERAL at 20:00

## 2020-10-10 RX ADMIN — SODIUM BICARBONATE 50 MEQ: 84 INJECTION, SOLUTION INTRAVENOUS at 16:32

## 2020-10-10 RX ADMIN — EPINEPHRINE 10 MCG: 1 INJECTION PARENTERAL at 22:15

## 2020-10-10 RX ADMIN — PANTOPRAZOLE SODIUM 40 MG: 40 INJECTION, POWDER, FOR SOLUTION INTRAVENOUS at 09:35

## 2020-10-10 RX ADMIN — EPINEPHRINE 10 MCG: 1 INJECTION PARENTERAL at 22:22

## 2020-10-10 RX ADMIN — SODIUM BICARBONATE 50 MEQ: 84 INJECTION, SOLUTION INTRAVENOUS at 05:05

## 2020-10-10 RX ADMIN — NORETHINDRONE 0.35 MG: 0.35 TABLET ORAL at 09:37

## 2020-10-10 RX ADMIN — NOREPINEPHRINE BITARTRATE 6.4 MCG: 1 INJECTION, SOLUTION, CONCENTRATE INTRAVENOUS at 22:12

## 2020-10-10 RX ADMIN — PHENYLEPHRINE HYDROCHLORIDE 200 MCG: 10 INJECTION INTRAVENOUS at 21:22

## 2020-10-10 RX ADMIN — Medication 1 UNITS: at 22:28

## 2020-10-10 RX ADMIN — ASPIRIN 81 MG: 81 TABLET, CHEWABLE ORAL at 09:35

## 2020-10-10 RX ADMIN — SODIUM BICARBONATE 50 MEQ: 84 INJECTION, SOLUTION INTRAVENOUS at 14:22

## 2020-10-10 RX ADMIN — HUMAN INSULIN 5 UNITS/HR: 100 INJECTION, SOLUTION SUBCUTANEOUS at 14:41

## 2020-10-10 RX ADMIN — EPINEPHRINE 10 MCG: 1 INJECTION PARENTERAL at 22:28

## 2020-10-10 RX ADMIN — CARBOXYMETHYLCELLULOSE SODIUM 2 DROP: 5 SOLUTION/ DROPS OPHTHALMIC at 09:34

## 2020-10-10 RX ADMIN — FENTANYL CITRATE 100 MCG: 50 INJECTION, SOLUTION INTRAMUSCULAR; INTRAVENOUS at 21:01

## 2020-10-10 RX ADMIN — Medication 50 MEQ: at 05:05

## 2020-10-10 RX ADMIN — DEXTROSE MONOHYDRATE 25 ML: 500 INJECTION PARENTERAL at 05:05

## 2020-10-10 RX ADMIN — NOREPINEPHRINE BITARTRATE 6.4 MCG: 1 INJECTION, SOLUTION, CONCENTRATE INTRAVENOUS at 21:45

## 2020-10-10 RX ADMIN — HEPARIN SODIUM 900 UNITS/HR: 10000 INJECTION, SOLUTION INTRAVENOUS at 11:51

## 2020-10-10 RX ADMIN — EPINEPHRINE 10 MCG: 1 INJECTION PARENTERAL at 21:49

## 2020-10-10 RX ADMIN — ROCURONIUM BROMIDE 100 MG: 10 INJECTION INTRAVENOUS at 20:15

## 2020-10-10 RX ADMIN — EPINEPHRINE 10 MCG: 1 INJECTION PARENTERAL at 21:58

## 2020-10-10 RX ADMIN — EPINEPHRINE 10 MCG: 1 INJECTION PARENTERAL at 22:36

## 2020-10-10 RX ADMIN — Medication 1 UNITS: at 22:32

## 2020-10-10 RX ADMIN — NOREPINEPHRINE BITARTRATE 0.05 MCG/KG/MIN: 1 INJECTION, SOLUTION, CONCENTRATE INTRAVENOUS at 21:27

## 2020-10-10 RX ADMIN — LEVOTHYROXINE SODIUM 50 MCG/HR: 20 INJECTION, SOLUTION INTRAVENOUS at 19:54

## 2020-10-10 RX ADMIN — PIPERACILLIN AND TAZOBACTAM 3.38 G: 3; .375 INJECTION, POWDER, FOR SOLUTION INTRAVENOUS at 09:35

## 2020-10-10 RX ADMIN — NOREPINEPHRINE BITARTRATE 6.4 MCG: 1 INJECTION, SOLUTION, CONCENTRATE INTRAVENOUS at 21:27

## 2020-10-10 RX ADMIN — LEVOTHYROXINE SODIUM 50 MCG/HR: 20 INJECTION, SOLUTION INTRAVENOUS at 03:38

## 2020-10-10 RX ADMIN — CARBOXYMETHYLCELLULOSE SODIUM 2 DROP: 5 SOLUTION/ DROPS OPHTHALMIC at 00:19

## 2020-10-10 RX ADMIN — PIPERACILLIN AND TAZOBACTAM 3.38 G: 3; .375 INJECTION, POWDER, FOR SOLUTION INTRAVENOUS at 03:15

## 2020-10-10 RX ADMIN — CARBOXYMETHYLCELLULOSE SODIUM 2 DROP: 5 SOLUTION/ DROPS OPHTHALMIC at 15:53

## 2020-10-10 RX ADMIN — NOREPINEPHRINE BITARTRATE 6.4 MCG: 1 INJECTION, SOLUTION, CONCENTRATE INTRAVENOUS at 21:50

## 2020-10-10 RX ADMIN — SODIUM CHLORIDE, SODIUM GLUCONATE, SODIUM ACETATE, POTASSIUM CHLORIDE AND MAGNESIUM CHLORIDE: 526; 502; 368; 37; 30 INJECTION, SOLUTION INTRAVENOUS at 20:51

## 2020-10-10 RX ADMIN — SODIUM BICARBONATE 50 MEQ: 84 INJECTION, SOLUTION INTRAVENOUS at 18:26

## 2020-10-10 RX ADMIN — EPINEPHRINE 10 MCG: 1 INJECTION PARENTERAL at 22:31

## 2020-10-10 RX ADMIN — Medication 1 UNITS: at 22:37

## 2020-10-10 RX ADMIN — CARBOXYMETHYLCELLULOSE SODIUM 2 DROP: 5 SOLUTION/ DROPS OPHTHALMIC at 03:36

## 2020-10-10 RX ADMIN — CARBOXYMETHYLCELLULOSE SODIUM 2 DROP: 5 SOLUTION/ DROPS OPHTHALMIC at 13:06

## 2020-10-10 RX ADMIN — SODIUM CHLORIDE 1000 MG: 9 INJECTION, SOLUTION INTRAVENOUS at 19:13

## 2020-10-10 RX ADMIN — HUMAN INSULIN 10 UNITS: 100 INJECTION, SOLUTION SUBCUTANEOUS at 05:05

## 2020-10-10 RX ADMIN — SODIUM CHLORIDE: 9 INJECTION, SOLUTION INTRAVENOUS at 20:14

## 2020-10-10 RX ADMIN — CALCIUM GLUCONATE 1 G: 98 INJECTION, SOLUTION INTRAVENOUS at 05:04

## 2020-10-10 RX ADMIN — HUMAN INSULIN 2 UNITS/HR: 100 INJECTION, SOLUTION SUBCUTANEOUS at 05:49

## 2020-10-10 RX ADMIN — EPINEPHRINE 10 MCG: 1 INJECTION PARENTERAL at 22:18

## 2020-10-10 RX ADMIN — TICAGRELOR 90 MG: 90 TABLET ORAL at 09:34

## 2020-10-10 RX ADMIN — NOREPINEPHRINE BITARTRATE 6.4 MCG: 1 INJECTION, SOLUTION, CONCENTRATE INTRAVENOUS at 21:57

## 2020-10-10 RX ADMIN — PHENYLEPHRINE HYDROCHLORIDE 200 MCG: 10 INJECTION INTRAVENOUS at 21:24

## 2020-10-10 RX ADMIN — NOREPINEPHRINE BITARTRATE 6.4 MCG: 1 INJECTION, SOLUTION, CONCENTRATE INTRAVENOUS at 21:47

## 2020-10-10 RX ADMIN — EPINEPHRINE 10 MCG: 1 INJECTION PARENTERAL at 22:25

## 2020-10-10 ASSESSMENT — ACTIVITIES OF DAILY LIVING (ADL)
ADLS_ACUITY_SCORE: 19

## 2020-10-10 NOTE — PROGRESS NOTES
Major Shift Events:  Potassium shift overnight. Recheck sent awaiting results. Insulin restarted for elevated glucose. Heparin gtt decreased d/t oozing and ACT on high end of range.     Plan: continue with [facility] guidance. Awaiting donation plan.     For vital signs and complete assessments, please see documentation flowsheets.

## 2020-10-10 NOTE — PROGRESS NOTES
Care coordinated through [facility]. Patient's  at bedside this AM to  belongings. See flowsheets for VS, gtts and outputs. Potential OR time 2000.

## 2020-10-10 NOTE — PROGRESS NOTES
ECMO Shift Summary:      ECMO Equipment:  Console serial number: [id number]  Circuit Lot number: [id number]  Oxygenator Lot number: [id number]      Patient remains on V/A ECMO, all equipment is functioning and alarms are appropriately set. RPM's 3800 with flow range 3.94-4.11 L/min. Sweep gas is at 2 LPM and FiO2 60%. Circuit remains free of air, clot and fibrin. Cannulas are secure with oozing from arterial cannula site. Extremities are warm.     Significant Shift Events: Stitch placed around Arterial cannula site for large amount of oozing. Oozing improved afterwards. Heparin decreased with oozing.    Vent settings:  Ventilation Mode: CMV/AC  (Continuous Mandatory Ventilation/ Assist Control)  FiO2 (%): 40 %  Rate Set (breaths/minute): 12 breaths/min  Tidal Volume Set (mL): 450 mL  PEEP (cm H2O): 10 cmH2O  Oxygen Concentration (%): 50 %  Resp: 12  .    Heparin is running at 600 u/hr, ACT range 160-180 actual ACT's 175-158 on 600U    Blood loss was from oozing. Product given included 2u prbc's.      Intake/Output Summary (Last 24 hours) at 10/10/2020 1834  Last data filed at 10/10/2020 1800  Gross per 24 hour   Intake 9636.88 ml   Output 2565 ml   Net 7071.88 ml       ECHO:  No results found for this or any previous visit.No results found for this or any previous visit.    CXR:  Recent Results (from the past 24 hour(s))   US Abd Complete w Abd/Pel Duplex Complete Port    Narrative    EXAMINATION: US ABDOMEN COMPLETE WITH DOPPLER COMPLETE PORTABLE  10/9/2020 7:57 PM     COMPARISON: CT chest abdomen pelvis 10/2/2020    HISTORY: Organ donor evaluation, liver, kidneys, pancreas, and  vasculature    TECHNIQUE: The abdomen was scanned in standard fashion with  specialized ultrasound transducer(s) using both gray-scale, color  Doppler, and spectral flow techniques.    Findings:  Liver: Measures 16.0 cm in length. The liver demonstrates normal  homogeneous echotexture. No evidence of a focal hepatic mass.     Extrahepatic  portal vein flow is antegrade at 32 cm/s.  Right portal vein flow is antegrade, measuring 22 cm/s.  Left portal vein flow is antegrade, measuring 16 cm/s.    Flow in the hepatic artery is towards the liver and: Monophasic  low-resistance Waveform with mid systolic deceleration consistent with  intra-aortic balloon pump.  146 cm/s peak systolic  0.55 resistive index.     The splenic vein is patent and flow is towards the liver.  The left,  middle, and right hepatic veins are patent with flow towards the IVC.  The IVC is patent with flow towards the heart. Catheter within the  IVC.  The visualized aorta is not dilated.    Gallbladder: There is no wall thickening, pericholecystic fluid,  positive sonographic Sahu's sign or evidence for cholelithiasis    Bile Ducts: Both the intra- and extrahepatic biliary system are of  normal caliber.  The common bile duct measures 40 m.    Pancreas: Visualized portions of the head and body of the pancreas are  unremarkable.     Kidneys: Echogenic parenchyma bilaterally without mass or  hydronephrosis.   Renal lengths: right- 12.3, left- 11.3.    Right renal artery at the hilum with peak systolic velocity 55 cm/s.  Resistive index 0.63. Waveforms consistent with intra-aortic balloon  pump.    Right renal vein at the hilum with velocities measuring 33 cm/s,  normal phasicity.    Left renal artery at the hilum with peak systolic velocity of 31 cm/s.  Resistive index 0.64. Waveform consistent with intra-aortic balloon  pump.    Left renal vein at the hilum with velocities measuring 24 cm/s. Normal  phasicity.    Spleen: The spleen measures 9.4 cm. in length.    Fluid: No evidence of ascites or pleural effusions.      Impression    Impression:   Echogenic renal parenchyma which can be seen with medical renal  disease. Otherwise, normal grayscale abdominal ultrasound.  Normal Doppler assessment given intra-aortic balloon pump.    I have personally reviewed the examination and initial  interpretation  and I agree with the findings.    TAMMY SANCHEZ MD   XR Chest Port 1 View    Narrative    XR CHEST PORT 1 VW  10/9/2020 8:18 PM      HISTORY: Organ donor evaluation    COMPARISON: Chest x-ray 10/9/2020    TECHNIQUE: Supine frontal view of the chest    FINDINGS: Mildly improved aeration in the right upper lobe. Hazy  opacity left lower lung zone. No pneumothorax. Left retrocardiac  streaky opacity. Cardiac mediastinal silhouette is within normal  limits. The trachea is midline. No suspicious osseous lesion.    Endotracheal tube tip projects over the midthoracic trachea.  Intra-aortic balloon pump is at the level of the thu. Inferior  approach ECMO cannula with tip projecting over the mid SVC. Esophageal  temperature probe projecting over the upper esophagus.      Impression    IMPRESSION:   1. Mild improvement in aeration in the right upper lobe collapse.   2. Hazy opacity left lung base favored to be layering pleural  effusion. There is also left retrocardiac airspace opacity, edema  versus atelectasis.    I have personally reviewed the examination and initial interpretation  and I agree with the findings.    TAMMY SANCHEZ MD       Labs:  Recent Labs   Lab 10/10/20  1758 10/10/20  1554 10/10/20  1337 10/10/20  1225   PH 7.44 7.43 7.37 7.40   PCO2 30* 28* 31* 29*   PO2 123* 132* 133* 172*   HCO3 20* 19* 18* 18*   O2PER 40.0 40.0 50 50       Lab Results   Component Value Date    HGB 7.7 (L) 10/10/2020    PHGB <30 10/10/2020    PLT 63 (L) 10/10/2020    FIBR 495 (H) 10/10/2020    INR 1.81 (H) 10/10/2020    PTT 42 (H) 10/10/2020    DD 18.7 (H) 10/10/2020    AXA 0.18 10/10/2020    ANTCH 74 (L) 10/10/2020         Plan is go to OR this evening for organ procurement.      Mele Pierce, RT  ECMO Specialist  10/10/2020 6:34 PM

## 2020-10-10 NOTE — H&P
Brief note  10/10/20  Nephrology consulted to assess whether anything should be done regarding support for renal function in this person with brain death who is going to be a  donor.  Her potassium was elevated overnight and has been corrected.  She remains on ECMO and plan is for organ procurement later this evening.  I do not see that anything should be changed to support kidney function.  I have called [facility] and notified them that she did have abnormal amount of albumin on UA with SG of 1.012 (not concentrated).  May want to consider kidney biopsy.    Kelsea Zavala MD  [facility]  Department of Medicine  Division of Renal Disease and Hypertension  [phone]

## 2020-10-10 NOTE — PROGRESS NOTES
ECMO Shift Summary: 12N      ECMO Equipment:  Console serial number: [id number]  Circuit Lot number: [id number]  Oxygenator Lot number: [id number]      Patient remains on [facility] ECMO, all equipment is functioning and alarms are appropriately set. RPM's 3800 with flow range 4.12-4.20 L/min. Sweep gas is at 0.5 LPM and FiO2 70%. Circuit remains free of air and clot, but fibrin is appreciated at connector sites. Cannulas are secure with significant bleeding from bilateral sites. Extremities are warm and well perfused. Pt TOD was 10/9 1453. [facility]ani has now taken over patient for care for future donation.     Significant Shift Events: Per [employer], would like to keep PaO2 >100. Titrating O2 on ECMO to keep above 100. Bleeding from R insertion site continuously but hgb has remained the same as well as vital signs. No pressors on patient overnight.     Vent settings:  Ventilation Mode: CMV/AC  (Continuous Mandatory Ventilation/ Assist Control)  FiO2 (%): 60 %  Rate Set (breaths/minute): 10 breaths/min  Tidal Volume Set (mL): 400 mL  PEEP (cm H2O): 10 cmH2O  Oxygen Concentration (%): 60 %  Resp: 10  .    Heparin is running at 1250 units/hr, ACT range 158-175. Goal 160-180.    Urine output is as RN charted, blood loss was moderate, only from R cannula site. Product given included 1 unit of PRBCs.      Intake/Output Summary (Last 24 hours) at 10/10/2020 0220  Last data filed at 10/10/2020 0200  Gross per 24 hour   Intake 5280.69 ml   Output 1705 ml   Net 3575.69 ml       ECHO:  No results found for this or any previous visit.No results found for this or any previous visit.    CXR:  Recent Results (from the past 24 hour(s))   NM Brain Scan Complete w Vascular Flow   Result Value    Radiologist flags No evidence of brain perfusion. (Urgent)    Narrative    BRAIN PERFUSION: NM BRAIN SCAN COMPLETE W VASCULAR FLOW    DATE: 10/9/2020 2:53 PM    HISTORY: brain death assessment    COMPARISON: CT head 10/6/2020    TECHNIQUE:   When  possible a head band is placed to reduced the external carotid  circulation over the scalp. Dynamin images were obtained after the IV  administration of 20 mCi of Tc99m Ceretec.  Static images were  obtained after the dynamic imaging series to look for intercranial  venous circulation.    FINDINGS:    The flow series demonstrates no perfusion to the brain.    The delayed static images demonstrates no perfusion to the brain.      Impression    IMPRESSION: No evidence of brain perfusion.    [Urgent Result: No evidence of brain perfusion.]    Finding was identified on 10/9/2020 3:19 PM.     [facility] was contacted by Dr. Duarte at 10/9/2020 3:25 PM and  verbalized understanding of the urgent finding.     US Abd Complete w Abd/Pel Duplex Complete Port    Narrative    EXAMINATION: US ABDOMEN COMPLETE WITH DOPPLER COMPLETE PORTABLE  10/9/2020 7:57 PM     COMPARISON: CT chest abdomen pelvis 10/2/2020    HISTORY: Organ donor evaluation, liver, kidneys, pancreas, and  vasculature    TECHNIQUE: The abdomen was scanned in standard fashion with  specialized ultrasound transducer(s) using both gray-scale, color  Doppler, and spectral flow techniques.    Findings:  Liver: Measures 16.0 cm in length. The liver demonstrates normal  homogeneous echotexture. No evidence of a focal hepatic mass.     Extrahepatic portal vein flow is antegrade at 32 cm/s.  Right portal vein flow is antegrade, measuring 22 cm/s.  Left portal vein flow is antegrade, measuring 16 cm/s.    Flow in the hepatic artery is towards the liver and: Monophasic  low-resistance Waveform with mid systolic deceleration consistent with  intra-aortic balloon pump.  146 cm/s peak systolic  0.55 resistive index.     The splenic vein is patent and flow is towards the liver.  The left,  middle, and right hepatic veins are patent with flow towards the IVC.  The IVC is patent with flow towards the heart. Catheter within the  IVC.  The visualized aorta is not  dilated.    Gallbladder: There is no wall thickening, pericholecystic fluid,  positive sonographic Sahu's sign or evidence for cholelithiasis    Bile Ducts: Both the intra- and extrahepatic biliary system are of  normal caliber.  The common bile duct measures 40 m.    Pancreas: Visualized portions of the head and body of the pancreas are  unremarkable.     Kidneys: Echogenic parenchyma bilaterally without mass or  hydronephrosis.   Renal lengths: right- 12.3, left- 11.3.    Right renal artery at the hilum with peak systolic velocity 55 cm/s.  Resistive index 0.63. Waveforms consistent with intra-aortic balloon  pump.    Right renal vein at the hilum with velocities measuring 33 cm/s,  normal phasicity.    Left renal artery at the hilum with peak systolic velocity of 31 cm/s.  Resistive index 0.64. Waveform consistent with intra-aortic balloon  pump.    Left renal vein at the hilum with velocities measuring 24 cm/s. Normal  phasicity.    Spleen: The spleen measures 9.4 cm. in length.    Fluid: No evidence of ascites or pleural effusions.      Impression    Impression:   Echogenic renal parenchyma which can be seen with medical renal  disease. Otherwise, normal grayscale abdominal ultrasound.  Normal Doppler assessment given intra-aortic balloon pump.    I have personally reviewed the examination and initial interpretation  and I agree with the findings.    TAMMY SANCHEZ MD   XR Chest Port 1 View    Narrative    XR CHEST PORT 1 VW  10/9/2020 8:18 PM      HISTORY: Organ donor evaluation    COMPARISON: Chest x-ray 10/9/2020    TECHNIQUE: Supine frontal view of the chest    FINDINGS: Mildly improved aeration in the right upper lobe. Hazy  opacity left lower lung zone. No pneumothorax. Left retrocardiac  streaky opacity. Cardiac mediastinal silhouette is within normal  limits. The trachea is midline. No suspicious osseous lesion.    Endotracheal tube tip projects over the midthoracic trachea.  Intra-aortic balloon  pump is at the level of the thu. Inferior  approach ECMO cannula with tip projecting over the mid SVC. Esophageal  temperature probe projecting over the upper esophagus.      Impression    IMPRESSION:   1. Mild improvement in aeration in the right upper lobe collapse.   2. Hazy opacity left lung base favored to be layering pleural  effusion. There is also left retrocardiac airspace opacity, edema  versus atelectasis.    I have personally reviewed the examination and initial interpretation  and I agree with the findings.    TAMMY SANCHEZ MD       Labs:  Recent Labs   Lab 10/10/20  0207 10/10/20  0025 10/09/20  2241 10/09/20  2020   PH 7.22* 7.31* 7.34* 7.35   PCO2 49* 39 36 36   PO2 125* 104 74* 130*   HCO3 20* 19* 19* 20*   O2PER 60 60 60 60       Lab Results   Component Value Date    HGB 9.1 (L) 10/09/2020    PHGB <30 10/09/2020    PLT 60 (L) 10/09/2020    FIBR 535 (H) 10/09/2020    INR 1.46 (H) 10/09/2020    PTT 59 (H) 10/09/2020    DD 10.4 (H) 10/09/2020    AXA 0.48 10/09/2020    ANTCH 69 (L) 10/09/2020         Plan is to remain on ECMO until patient is taken to OR for donation process. Continue to wean and support as tolerated. See flowsheet for assessments and details.      Sandra Lance RN  ECMO Specialist  10/10/2020 2:20 AM

## 2020-10-10 NOTE — PROGRESS NOTES
[employer] NOTE:    I have spoken with Eben, Kathi's ,  as donation had been mentioned and I shared with them in a caring and sensitive manner the opportunities their loved one has to save lives. I have shared with Eben Armenta s known intent through donor designation as indicated by her [place]  s License and the Eben is in support of their loved one s decision. Together with hospital staff, I have asked how we can support them as we carry out the donor s intent to save lives and I have compassionately and respectfully provided answers to questions. I have reviewed the Designation of Gift form with Eben and explained the information, including uses for the donated organs, tissues and eyes. In addition I have explained the recovery process and the impact of donation on  plans. They have been offered/provided a copy of the form along with contact information for any future needs.    Thank you,    Judson Ceballos, Donation Coordinator  on behalf of Shirin Carter,   [facility]  [phone]

## 2020-10-10 NOTE — PROGRESS NOTES
Pt arrived from OR around 1530. See flowsheets for VS, hemodynamics and gtt titrations. 1Plt, 2FFP, 500 LR, 2 amps Bicarb given to time. Prop and Fent infusing as ordered. PERRL CMV settings in epic. See flowsheets for UOP. Wife Tara updated at bedside.

## 2020-10-11 LAB
BACTERIA SPEC CULT: ABNORMAL
BACTERIA SPEC CULT: NO GROWTH
Lab: ABNORMAL
SPECIMEN SOURCE: ABNORMAL
SPECIMEN SOURCE: NORMAL

## 2020-10-11 PROCEDURE — 200N000002 HC R&B ICU UMMC

## 2020-10-11 NOTE — ANESTHESIA POSTPROCEDURE EVALUATION
Anesthesia POST Procedure Evaluation    Patient: Kathi Rehman   MRN:     [id number] Gender:   female   Age:    45 year old :      1975        Preoperative Diagnosis: Surgical proc, unsp cause abn react/compl, w/o misadvnt [Y83.9]   Procedure(s):  SURGICAL PROCUREMENT, LIVER , BILATERAL KIDNEY AND HEART VALVE   Postop Comments: No value filed.     Anesthesia Type: General       JZG FV AN POST EVALUATION    Last Anesthesia Record Vitals:  CRNA VITALS  10/10/2020 2214 - 10/10/2020 2257      10/10/2020             Pulse:  87    ART BP:  (!) 19/14    ART Mean:  15    Ht Rate:  (!) 31    Temp:  (!) 33  C (91.4  F)    SpO2:  100 %    Resp Rate (observed):  12          Last PACU Vitals:  Vitals Value Taken Time   BP     Temp     Pulse     Resp     SpO2     Temp src     NIBP     Pulse 87 10/10/20 2244   SpO2 100 % 10/10/20 2244   Resp     Temp 33  C (91.4  F) 10/10/20 224   Ht Rate 31 10/10/20 2244   Temp 2           Electronically Signed By: Mago Means MD, October 10, 2020, 10:57 PM

## 2020-10-11 NOTE — ANESTHESIA PREPROCEDURE EVALUATION
Anesthesia Pre-Procedure Evaluation    Patient: Kathi Rehman   MRN:     [id number] Gender:   female   Age:    45 year old :      1975        Preoperative Diagnosis: Surgical proc, unsp cause abn react/compl, w/o misadvnt [Y83.9]   Procedure(s):  SURGICAL PROCUREMENT, LIVER , BILATERAL KIDNEY AND HEART VALVE     LABS:  CBC:   Lab Results   Component Value Date    WBC 27.1 (H) 10/10/2020    WBC 22.7 (H) 10/10/2020    HGB 7.1 (L) 10/10/2020    HGB 7.7 (L) 10/10/2020    HCT 21.4 (L) 10/10/2020    HCT 23.0 (L) 10/10/2020    PLT 66 (L) 10/10/2020    PLT 63 (L) 10/10/2020     BMP:   Lab Results   Component Value Date     (HH) 10/10/2020     (H) 10/10/2020    POTASSIUM 3.6 10/10/2020    POTASSIUM 4.2 10/10/2020    CHLORIDE 128 (H) 10/10/2020    CHLORIDE 126 (H) 10/10/2020    CO2 22 10/10/2020    CO2 20 10/10/2020    BUN 91 (H) 10/10/2020    BUN 90 (H) 10/10/2020    CR 4.74 (H) 10/10/2020    CR 4.63 (H) 10/10/2020     (H) 10/10/2020     (H) 10/10/2020     COAGS:   Lab Results   Component Value Date    PTT 41 (H) 10/10/2020    INR 1.83 (H) 10/10/2020    FIBR 495 (H) 10/10/2020     POC:   Lab Results   Component Value Date     (H) 10/10/2020     OTHER:   Lab Results   Component Value Date    PH 7.45 10/10/2020    LACT 1.5 10/10/2020    A1C 5.3 10/02/2020    CARMELO 7.9 (L) 10/10/2020    PHOS 3.8 10/10/2020    MAG 2.4 (H) 10/10/2020    ALBUMIN 1.5 (L) 10/10/2020    PROTTOTAL 4.0 (L) 10/10/2020    ALT 84 (H) 10/10/2020     (H) 10/10/2020    ALKPHOS 89 10/10/2020    BILITOTAL 1.1 10/10/2020    LIPASE 148 10/10/2020    AMYLASE 59 10/10/2020    .0 (H) 10/10/2020    SED 72 (H) 10/10/2020        Preop Vitals    BP Readings from Last 3 Encounters:   No data found for BP    Pulse Readings from Last 3 Encounters:   10/10/20 103      Resp Readings from Last 3 Encounters:   10/10/20 12    SpO2 Readings from Last 3 Encounters:   10/10/20 99%      Temp Readings from Last 1 Encounters:  "  10/10/20 37  C (98.6  F)    Ht Readings from Last 1 Encounters:   10/02/20 1.676 m (5' 6\")      Wt Readings from Last 1 Encounters:   10/08/20 95.8 kg (211 lb 3.2 oz)    Estimated body mass index is 34.09 kg/m  as calculated from the following:    Height as of this encounter: 1.676 m (5' 6\").    Weight as of this encounter: 95.8 kg (211 lb 3.2 oz).     LDA:  Arterial Line 10/02/20 Radial (Active)   Site Assessment WDL 10/10/20 1600   Line Status Pulsatile blood flow 10/10/20 1600   Arterine Line Cap Change Due 10/10/20 10/10/20 1600   Art Line Waveform Appropriate 10/10/20 1600   Art Line Interventions Leveled;Connections checked and tightened 10/10/20 1600   Color/Movement/Sensation Capillary refill less than 3 sec 10/10/20 1600   Line Necessity Yes, meets criteria 10/10/20 1600   Dressing Type Transparent 10/10/20 1600   Dressing Status Clean, dry, intact 10/10/20 1600   Dressing Intervention Dressing reinforced 10/08/20 0400   Dressing Change Due 10/11/20 10/10/20 1600   Number of days: 8       Arterial Sheath  (Active)   Specific Qualities Sutured 10/10/20 1600   Site Assessment Bleeding;Draining 10/10/20 1600   Dressing Type Gauze;Transparent 10/10/20 1600   Dressing Intervention Dressing changed/new dressing 10/10/20 1200   Arterial Sheath Comment ECMO 10/10/20 1600   Number of days: 8       Arterial Sheath  (Active)   Specific Qualities Sutured 10/10/20 1600   Site Assessment UTV 10/10/20 1600   Dressing Type Gauze;Transparent 10/10/20 1600   Dressing Intervention Dressing changed/new dressing 10/10/20 1200   Arterial Sheath Comment RP Cath 10/10/20 1600   Number of days: 8       Arterial Sheath  (Active)   Specific Qualities Sutured 10/10/20 1600   Site Assessment WDL 10/10/20 1600   Dressing Type Transparent 10/10/20 1600   Dressing Intervention Dressing reinforced 10/08/20 0400   Arterial Sheath Comment IABP 10/10/20 1600   Number of days: 8       Venous Sheath (Active)   Specific Qualities Sutured " 10/10/20 1600   Site Assessment WD 10/10/20 1600   Dressing Type Transparent 10/10/20 1600   Dressing Intervention Dressing reinforced 10/08/20 0400   Venous Sheath Comment ECMO 10/10/20 1600   Number of days: 8       Intravascular Temperature Management Device - ICY Catheter Physician Baljeet Right;Femoral (Active)   Site Assessment WD 10/10/20 1600   Dressing Change Site Care As Ordered 10/10/20 1600   Dressing Type Sterile Occlusive Dressing 10/10/20 1600   Hypothermia In/Out Lumens Locked Together 10/10/20 1600   Status:  Proximal Lumen Infusing 10/10/20 1600   Status:  Medial Lumen Infusing 10/10/20 1600   Status:  Distal Lumen Infusing 10/10/20 1600   Reason for Central Line Medication Requirement 10/10/20 1600   Tubing Change No 10/10/20 1600   Cap Change No 10/10/20 1600   Number of days: 8       ETT 7.5 mm (Active)   Secured at (cm) 24 cm 10/10/20 1540   Measured from Lips 10/10/20 1540   Position Center 10/10/20 1540   Secured by Commercial tube bergeron 10/10/20 1540   Bite Block Molar 10/10/20 1540   Site Appearance Edema 10/10/20 1540   Tube Care Site care done 10/10/20 1125   Cuff Assessment Minimal occluding volume 10/10/20 1540   Safety Measures Manual resuscitator at bedside 10/10/20 1540   Number of days: 8       NG/OG/NJ Tube Orogastric Center mouth (Active)   Site Description WDL 10/10/20 1600   Status Medications only 10/10/20 1600   Drainage Appearance Bile 10/10/20 1600   Placement Confirmation [place] unchanged 10/10/20 1600   [place] (cm marking) at nare/mouth 76 cm 10/10/20 1600   Intake (ml) 30 ml 10/10/20 0800   Flush/Free Water (mL) 250 mL 10/10/20 1800   Container Amount 800 mL 10/10/20 1900   Output (ml) 150 ml 10/10/20 1900   Number of days: 8       Rectal Tube With balloon (Active)   Balloon fill volume  45 cc 10/07/20 2000   Stool Leakage None 10/10/20 1600   Rectal Tube Container Volume 200 ml 10/10/20 1600   Rectal Tube Output 0 ml 10/10/20 1600   Number of days: 3       Urethral  Catheter Temperature probe (Active)   Tube Description Positional 10/09/20 1600   Catheter Care Catheter wipes 10/10/20 1600   Collection Container Standard;Patent 10/10/20 1600   Securement Method Securing device (Describe) 10/10/20 1600   Rationale for Continued Use Strict 1-2 Hour I&O 10/10/20 1600   Urine Output 60 mL 10/10/20 1900   Number of days: 6        No past medical history on file.   Past Surgical History:   Procedure Laterality Date     CV ARTERIAL LINE PLACEMENT N/A 10/2/2020    Procedure: Arterial Line Placement;  Surgeon: Luis Rowland MD;  Location: [facility] CARDIAC CATH LAB     CV CORONARY ANGIOGRAM N/A 10/2/2020    Procedure: Coronary Angiogram;  Surgeon: Luis Rowland MD;  Location: [facility] CARDIAC CATH LAB     CV EXTRACORPERAL MEMBRANE OXYGENATION N/A 10/2/2020    Procedure: Extracorporeal Membrance Oxygenation;  Surgeon: Luis Rowland MD;  Location: [facility] CARDIAC CATH LAB     CV INTRA-AORTIC BALLOON PUMP INSERTION N/A 10/2/2020    Procedure: Intra-Aortic Balloon Pump Insertion;  Surgeon: Luis Rowland MD;  Location: [facility] CARDIAC CATH LAB     CV INTRAVASULAR ULTRASOUND N/A 10/2/2020    Procedure: Intravascular Ultrasound;  Surgeon: Luis Rowland MD;  Location: [facility] CARDIAC CATH LAB     CV PCI ANGIOPLASTY N/A 10/2/2020    Procedure: Percutaneous Coronary Intervention Angioplasty;  Surgeon: Luis Rowland MD;  Location: [facility] CARDIAC CATH LAB     CV THERAPEUTIC HYPOTHERMIA N/A 10/2/2020    Procedure: Therapeutic Hypothermia;  Surgeon: Luis Rowland MD;  Location:  HEART CARDIAC CATH LAB      No Known Allergies     Anesthesia Evaluation     .             ROS/MED HX    ENT/Pulmonary:       Neurologic:     (+)other neuro brain dead for oragan transplant    Cardiovascular:         METS/Exercise Tolerance:     Hematologic:         Musculoskeletal:         GI/Hepatic:         Renal/Genitourinary:         Endo:         Psychiatric:         Infectious Disease:          Malignancy:         Other:                         PHYSICAL EXAM:   Mental Status/Neuro:    Airway: Facies: Feasible  Mallampati: Not Assessed  Mouth/Opening: Not Assessed  TM distance: Not Assessed  Neck ROM: Not Assessed  Airway Device: ETT   Respiratory:    CV:    Comments:                      Assessment:   ASA SCORE: 5 emergent   H&P: History and physical reviewed and following examination; no interval change.         Plan:   Anes. Type:  General   Pre-Medication: None   Induction:  IV (Standard)   Airway: ETT; Oral   Access/Monitoring: PIV   Maintenance: Balanced     Postop Plan:   Postop Pain: Opioids  Postop Sedation/Airway: Not planned     PONV Management:   Adult Risk Factors: Female, Postop Opioids     CONSENT: Not Applicable   Plan and risks discussed with: Not Applicable   Blood Products: N/a                   Mago Means MD

## 2020-10-11 NOTE — PROGRESS NOTES
ECLS NOTE:    Transported to OR for organ procurement at 2000 without difficulty.  ECMO circuit clamped and cut away at 2237.

## 2020-10-12 LAB
BACTERIA SPEC CULT: NO GROWTH
SPECIMEN SOURCE: NORMAL

## 2020-10-12 PROCEDURE — 200N000002 HC R&B ICU UMMC

## 2020-10-13 PROCEDURE — 200N000002 HC R&B ICU UMMC

## 2020-10-14 LAB — S100 CA BINDING PROTEIN B SER-MCNC: 7476 NG/L (ref 0–96)

## 2020-10-15 LAB
7AMINOCLONAZEPAM BLD CFM-MCNC: NEGATIVE NG/ML
ALPRAZ SERPL-MCNC: NEGATIVE NG/ML
AMPHETAMINES SPEC-MCNC: NEGATIVE NG/ML
APAP BLD-MCNC: NEGATIVE UG/ML
BACTERIA SPEC CULT: NO GROWTH
BACTERIA SPEC CULT: NO GROWTH
BARBITURATES SPEC-MCNC: NEGATIVE UG/ML
BENZODIAZ SERPL QL: POSITIVE
BENZODIAZ SPEC-MCNC: POSITIVE NG/ML
BUPRENORPHINE SERPLBLD-MCNC: NEGATIVE NG/ML
CARBOXYTHC BLD-MCNC: NEGATIVE NG/ML
CARISOPRODOL IA: NEGATIVE UG/ML
CHLORDIAZEP SERPL-MCNC: NEGATIVE NG/ML
CLONAZEPAM SERPL CFM-MCNC: NEGATIVE NG/ML
COCAINE METABOLITE IA: NEGATIVE NG/ML
DECLARED MEDICATIONS: ABNORMAL
DESALKYLFLURAZ SERPL-MCNC: NEGATIVE NG/ML
DIAZEPAM SERPL-MCNC: NEGATIVE NG/ML
ETHANOL BLD-MCNC: NEGATIVE GM/DL
FENTANYL IA: NEGATIVE NG/ML
FLURAZEPAM SERPL-MCNC: NEGATIVE NG/ML
GABAPENTIN IA: NEGATIVE UG/ML
LORAZEPAM BLD CFM-MCNC: NEGATIVE NG/ML
Lab: NORMAL
Lab: NORMAL
MEPERIDINE SERPLBLD-MCNC: NEGATIVE NG/ML
METHADONE BLD-MCNC: NEGATIVE NG/ML
MIDAZOLAM BLD CFM-MCNC: 88 NG/ML
NORCHLORDIAZEP SERPL-MCNC: NEGATIVE NG/ML
NORDIAZEPAM SERPL-MCNC: NEGATIVE NG/ML
OPIATES SPEC-MCNC: NEGATIVE NG/ML
OTHER DRUGS DETECTED: POSITIVE
OXAZEPAM SERPL-MCNC: NEGATIVE NG/ML
OXYCODONE SERPLBLD SCN-MCNC: NEGATIVE NG/ML
PCP SPEC-MCNC: NEGATIVE NG/ML
PROPOXYPH SPEC-MCNC: NEGATIVE NG/ML
SPECIMEN SOURCE: NORMAL
SPECIMEN SOURCE: NORMAL
TEMAZEPAM SERPL-MCNC: NEGATIVE NG/ML
TRAMADOL BLD-MCNC: NEGATIVE NG/ML
TRIAZOLAM BLD-MCNC: NEGATIVE NG/ML

## 2020-10-16 PROCEDURE — 200N000002 HC R&B ICU UMMC

## 2020-10-17 PROCEDURE — 200N000002 HC R&B ICU UMMC

## 2020-10-18 PROCEDURE — 200N000002 HC R&B ICU UMMC

## 2020-10-19 PROCEDURE — 200N000002 HC R&B ICU UMMC

## 2020-10-20 PROCEDURE — 200N000002 HC R&B ICU UMMC

## 2020-10-21 PROCEDURE — 200N000002 HC R&B ICU UMMC

## 2020-10-22 PROCEDURE — 200N000002 HC R&B ICU UMMC

## 2020-10-23 PROCEDURE — 200N000002 HC R&B ICU UMMC

## 2020-10-24 PROCEDURE — 200N000002 HC R&B ICU UMMC

## 2020-10-25 PROCEDURE — 200N000002 HC R&B ICU UMMC

## 2020-10-26 PROCEDURE — 200N000002 HC R&B ICU UMMC

## 2020-10-27 PROCEDURE — 200N000002 HC R&B ICU UMMC

## 2020-10-28 PROCEDURE — 200N000002 HC R&B ICU UMMC

## 2020-10-29 PROCEDURE — 200N000002 HC R&B ICU UMMC

## 2020-10-30 PROCEDURE — 200N000002 HC R&B ICU UMMC

## 2020-10-31 PROCEDURE — 200N000002 HC R&B ICU UMMC

## 2020-11-01 PROCEDURE — 999N000015 HC STATISTIC ARTERIAL MONITORING DAILY

## 2020-11-01 PROCEDURE — 200N000002 HC R&B ICU UMMC

## 2020-11-02 PROCEDURE — 200N000002 HC R&B ICU UMMC

## 2020-11-03 PROCEDURE — 200N000002 HC R&B ICU UMMC

## 2020-11-04 PROCEDURE — 200N000002 HC R&B ICU UMMC

## 2020-11-05 PROCEDURE — 200N000002 HC R&B ICU UMMC

## 2020-11-06 PROCEDURE — 200N000002 HC R&B ICU UMMC

## 2020-11-07 PROCEDURE — 200N000002 HC R&B ICU UMMC

## 2020-11-08 PROCEDURE — 200N000002 HC R&B ICU UMMC

## 2020-11-09 PROCEDURE — 200N000002 HC R&B ICU UMMC

## 2020-11-10 PROCEDURE — 200N000002 HC R&B ICU UMMC

## 2020-11-11 PROCEDURE — 200N000002 HC R&B ICU UMMC

## 2020-11-12 PROCEDURE — 200N000002 HC R&B ICU UMMC

## 2020-11-13 PROCEDURE — 200N000002 HC R&B ICU UMMC

## 2020-11-14 PROCEDURE — 200N000002 HC R&B ICU UMMC

## 2020-11-15 PROCEDURE — 200N000002 HC R&B ICU UMMC

## 2020-11-16 PROCEDURE — 200N000002 HC R&B ICU UMMC

## 2024-02-19 NOTE — Clinical Note
CECILIA Li calling for delay in SOC orders from discharge from TCU. Wants SOC today for nursing.     Please advise if ok to SOC today.    LAD

## 2025-03-11 NOTE — PROGRESS NOTES
ECMO Attending Progress Note  10/4/2020    Sachi Bolanos is a 44 year old female who was cannulated for ECMO 10/2/2020 due to refractory VF arrest    Cannulation Site:  15 Fr in the R femoral artery  25 Fr in the L femoral vein    Interval events: improved hemodynamic stability - maintained during turndown.    Physical Exam:  Temp:  [95.5  F (35.3  C)-98.6  F (37  C)] 98.4  F (36.9  C)  Pulse:  [] 100  Resp:  [13-19] 14  MAP:  [70 mmHg-96 mmHg] 86 mmHg  Arterial Line BP: ()/(11-72) 112/51  FiO2 (%):  [50 %-60 %] 50 %  SpO2:  [96 %-100 %] 98 %    Intake/Output Summary (Last 24 hours) at 10/4/2020 2317  Last data filed at 10/4/2020 2300  Gross per 24 hour   Intake 1383.85 ml   Output 1900 ml   Net -516.15 ml        Ventilation Mode: CMV/AC  (Continuous Mandatory Ventilation/ Assist Control)  FiO2 (%): 50 %  Rate Set (breaths/minute): 10 breaths/min  Tidal Volume Set (mL): 400 mL  PEEP (cm H2O): 7 cmH2O  Oxygen Concentration (%): 50 %  Resp: 14       Labs:  Recent Labs   Lab 10/04/20  2153 10/04/20  2003 10/04/20  1812 10/04/20  1607   PH 7.40 7.39 7.41 7.41   PCO2 36 37 35 36   PO2 84 83 84 86   HCO3 23 23 22 22   O2PER 50 50 50 50      Recent Labs   Lab 10/04/20  2153 10/04/20  1607 10/04/20  1014 10/04/20  0346   WBC 18.0* 18.7* 17.7* 16.3*   HGB 7.2* 7.6* 8.0* 8.5*     Creatinine   Date Value Ref Range Status   10/04/2020 2.29 (H) 0.52 - 1.04 mg/dL Final   10/04/2020 2.13 (H) 0.52 - 1.04 mg/dL Final   10/04/2020 2.12 (H) 0.52 - 1.04 mg/dL Final   10/04/2020 1.96 (H) 0.52 - 1.04 mg/dL Final   Blood Flow (Circuit) LPM: 3.49 LPM  Gas Flow  LPM: 0.5 LPM  Gas FiO2   %: 40 %  ACT  (seconds): 145 seconds  Blood Temp  (degrees C): 37.1 C  Pulse Oximetry  (SpO2%): 98 %  Arterial Pressure  mmH mmHg    ECMO Issues including assessments and plan on DOS 10/4/2020:  Neuro: Sedated for mechanical ventilation and ECMO.  No acute distress.  NIRS stable b/l  RASS goal: -3  CV: Cardiogenic shock.   Hemodynamically stable on no pressors  Pulm: Keep vent settings at rest settings as above.  FEN/Renal: Electrolytes stable w/ replacement protocols in place, Cr increasing slightly, UOP stable  Heme: ACT goal: 160-180, Hemoglobin stable after transfusion.  Minimal oozing around the ECMO cannulas now with pressure held and purse string suture in place.  ID: Receiving empiric antibiotics  Cannulae: Position is acceptable on exam and the available imaging.  Distal perfusion cannula is in place and patent.  Extremities are well-perfused.     I have personally reviewed the ECMO flows, oxygenation and CO2 clearance, anticoagulation, and cannula position.  I have also personally assessed the patient's systemic response with hemodynamics, oxygenation, ventilation, and bleeding.       The patient requires continued ECMO support and management in the ICU.  I have discussed patient care and treatment plan with the primary team.      Luis Rowland MD, PhD  Interventional/Critical Care Cardiology  [phone]    October 4, 2020     No change

## (undated) DEVICE — SU ETHIBOND 5 LRDA 30" B499T

## (undated) DEVICE — CATH US OD 5FR OD SEC 2.9FR EAGLE EYE PLATINUM 0.014 85900P

## (undated) DEVICE — KIT START UP COOLGARD STD TUBING 6FT

## (undated) DEVICE — PACK HEART LEFT CUSTOM

## (undated) DEVICE — SU SILK 5-0 TIE 12X30" A302H

## (undated) DEVICE — SU SILK 3-0 TIE 12X30" A304H

## (undated) DEVICE — PAD CHUX UNDERPAD 23X24" 7136

## (undated) DEVICE — SU PROLENE 6-0 RB-2DA 30" 8711H

## (undated) DEVICE — KIT HAND CONTROL ACIST 014644 AR-P54

## (undated) DEVICE — WIRE GUIDE 0.035"X260CM AMPTLAZ XSTIFF CVD THSCF-35-260-3-A

## (undated) DEVICE — PACK SET-UP STD 9102

## (undated) DEVICE — CANNULA VENOUS 25FR LONG

## (undated) DEVICE — CATH ANGIO SUPERTORQUE PLUS JL4 6FRX100CM 533620

## (undated) DEVICE — SU SILK 3-0 SH CR 8X18" C013D

## (undated) DEVICE — ESU GROUND PAD ADULT W/CORD E7507

## (undated) DEVICE — Device

## (undated) DEVICE — SU SILK 0 TIE 6X30" A306H

## (undated) DEVICE — MANIFOLD KIT ANGIO AUTOMATED 014613

## (undated) DEVICE — LINEN TOWEL PACK X30 5481

## (undated) DEVICE — INTRO SHEATH 6FRX25CM PINNACLE RSS606

## (undated) DEVICE — GUIDEWIRE VASC 0.014INX180CM RUNTHROUGH 25-1011

## (undated) DEVICE — BONE WAX 2.5GM W31G

## (undated) DEVICE — PREP CHLORAPREP 26ML TINTED ORANGE  260815

## (undated) DEVICE — SUCTION TIP YANKAUER STR K87

## (undated) DEVICE — CARTRIDGE ERBEJET PUMP 20150-300

## (undated) DEVICE — INTRO SHEATH MICRO PLATINUM TIP 4FRX40CM 7274

## (undated) DEVICE — SOL WATER IRRIG 1000ML BOTTLE 2F7114

## (undated) DEVICE — WIRE GUIDE 0.035"X145CM AMPLATZ XSTIFF J THSCF-35-145-3-AES

## (undated) DEVICE — SU PDS II 0 CTX 36" Z370T

## (undated) DEVICE — CATH BALLOON NC EMERGE 5.00X15MM H7493926715500

## (undated) DEVICE — SOL NACL 0.9% IRRIG 1000ML BOTTLE 2F7124

## (undated) DEVICE — ARROW ARTERIAL LINE KIT 20GA X 12CM, .025IN DIA SPRING-WIRE GUIDE, WITH SHARPS SAFETY FEATURES

## (undated) DEVICE — CATH ANGIO INFINITI 3DRC 6FRX100CM 534676T

## (undated) DEVICE — INTRO SHEATH 9FRX10CM PINNACLE RSS902

## (undated) DEVICE — INTRO SHTH INTRO SHTH 8FR X 11

## (undated) DEVICE — CANNULA ART HLS 15FR ECMO BIOL

## (undated) DEVICE — SU PROLENE 3-0 SHDA 36" 8522H

## (undated) DEVICE — KIT ACCESSORY INTRO INFLATION SYS 20/30 PRIORITY 1000186-115

## (undated) DEVICE — DRAPE SLUSH/WARMER 66X44" ORS-320

## (undated) DEVICE — WIPE PREMOIST CLEANSING WASHCLOTHS 7988

## (undated) DEVICE — LINEN TOWEL PACK X6 WHITE 5487

## (undated) DEVICE — INTRO GLIDESHEATH SLENDER 6FR 10X45CM 60-1060

## (undated) DEVICE — STPL SKIN 35W ROTATING HEAD PRW35

## (undated) DEVICE — DRAPE IOBAN INCISE 23X17" 6650EZ

## (undated) DEVICE — SU UMBILICAL TAPE .125X30" U11T

## (undated) DEVICE — CATH BALLOON EMERGE 2.5X15MM H7493918915250

## (undated) DEVICE — DRAPE ISOLATION BAG 1003

## (undated) DEVICE — TUBING IRRIG CYSTO/BLADDER SET 81" LF 2C4040

## (undated) DEVICE — CATH GUIDING BLUE YELLOW PTFE XB3.5 6FRX100CM 67005400

## (undated) DEVICE — VALVE HEMOSTASIS .096" COPILOT MECH 1003331

## (undated) DEVICE — SU SILK 2-0 TIE 12X30" A305H

## (undated) DEVICE — SU PROLENE 5-0 RB-1DA 36"  8556H

## (undated) DEVICE — CATH QUATTRO 9.3FR  FEM INSTRN

## (undated) DEVICE — SU SILK 4-0 TIE 12X30" A303H

## (undated) DEVICE — SU SILK 1 TIE 6X30" A307H

## (undated) DEVICE — DRAPE SHEET REV FOLD 3/4 9349

## (undated) RX ORDER — HEPARIN SODIUM 1000 [USP'U]/ML
INJECTION, SOLUTION INTRAVENOUS; SUBCUTANEOUS
Status: DISPENSED
Start: 2020-10-10

## (undated) RX ORDER — ALBUMIN, HUMAN INJ 5% 5 %
SOLUTION INTRAVENOUS
Status: DISPENSED
Start: 2020-01-01

## (undated) RX ORDER — FENTANYL CITRATE 50 UG/ML
INJECTION, SOLUTION INTRAMUSCULAR; INTRAVENOUS
Status: DISPENSED
Start: 2020-10-10

## (undated) RX ORDER — NOREPINEPHRINE BITARTRATE 0.06 MG/ML
INJECTION, SOLUTION INTRAVENOUS
Status: DISPENSED
Start: 2020-01-01